# Patient Record
Sex: MALE | Race: BLACK OR AFRICAN AMERICAN | Employment: OTHER | URBAN - METROPOLITAN AREA
[De-identification: names, ages, dates, MRNs, and addresses within clinical notes are randomized per-mention and may not be internally consistent; named-entity substitution may affect disease eponyms.]

---

## 2017-07-31 ENCOUNTER — HOSPITAL ENCOUNTER (OUTPATIENT)
Dept: ULTRASOUND IMAGING | Age: 72
Discharge: HOME | End: 2017-07-31
Attending: FAMILY MEDICINE
Payer: MEDICARE

## 2017-07-31 ENCOUNTER — HOSPITAL ENCOUNTER (OUTPATIENT)
Dept: BONE DENSITY | Age: 72
Discharge: HOME | End: 2017-07-31
Attending: FAMILY MEDICINE
Payer: MEDICARE

## 2017-07-31 DIAGNOSIS — Z13.6 SCREENING FOR AAA (ABDOMINAL AORTIC ANEURYSM): ICD-10-CM

## 2017-07-31 DIAGNOSIS — M89.9 BONE DISORDER: ICD-10-CM

## 2017-07-31 PROCEDURE — 76706 US ABDL AORTA SCREEN AAA: CPT

## 2017-07-31 PROCEDURE — 77080 DXA BONE DENSITY AXIAL: CPT

## 2020-07-20 ENCOUNTER — APPOINTMENT (OUTPATIENT)
Dept: GENERAL RADIOLOGY | Age: 75
End: 2020-07-20
Attending: PHYSICIAN ASSISTANT
Payer: MEDICARE

## 2020-07-20 ENCOUNTER — APPOINTMENT (OUTPATIENT)
Dept: CT IMAGING | Age: 75
End: 2020-07-20
Attending: PHYSICIAN ASSISTANT
Payer: MEDICARE

## 2020-07-20 ENCOUNTER — HOSPITAL ENCOUNTER (EMERGENCY)
Age: 75
Discharge: HOME | End: 2020-07-20
Attending: EMERGENCY MEDICINE | Admitting: EMERGENCY MEDICINE
Payer: MEDICARE

## 2020-07-20 VITALS
TEMPERATURE: 98.5 F | RESPIRATION RATE: 16 BRPM | SYSTOLIC BLOOD PRESSURE: 189 MMHG | DIASTOLIC BLOOD PRESSURE: 71 MMHG | HEART RATE: 66 BPM | OXYGEN SATURATION: 100 %

## 2020-07-20 DIAGNOSIS — M62.838 MUSCLE SPASM: Primary | ICD-10-CM

## 2020-07-20 DIAGNOSIS — R10.9 ACUTE LEFT FLANK PAIN: ICD-10-CM

## 2020-07-20 LAB
ALBUMIN SERPL-MCNC: 3.8 G/DL (ref 3.5–5)
ALBUMIN/GLOB SERPL: 1 {RATIO} (ref 1.1–2.2)
ALP SERPL-CCNC: 50 U/L (ref 45–117)
ALT SERPL-CCNC: 46 U/L (ref 12–78)
ANION GAP SERPL CALC-SCNC: 6 MMOL/L (ref 5–15)
APPEARANCE UR: CLEAR
AST SERPL-CCNC: 24 U/L (ref 15–37)
BACTERIA URNS QL MICRO: NEGATIVE /HPF
BASOPHILS # BLD: 0 K/UL (ref 0–0.1)
BASOPHILS NFR BLD: 0 % (ref 0–1)
BILIRUB SERPL-MCNC: 0.4 MG/DL (ref 0.2–1)
BILIRUB UR QL: NEGATIVE
BUN SERPL-MCNC: 49 MG/DL (ref 6–20)
BUN/CREAT SERPL: 20 (ref 12–20)
CALCIUM SERPL-MCNC: 9.7 MG/DL (ref 8.5–10.1)
CHLORIDE SERPL-SCNC: 102 MMOL/L (ref 97–108)
CK SERPL-CCNC: 329 U/L (ref 39–308)
CO2 SERPL-SCNC: 29 MMOL/L (ref 21–32)
COLOR UR: NORMAL
COMMENT, HOLDF: NORMAL
CREAT SERPL-MCNC: 2.49 MG/DL (ref 0.7–1.3)
DIFFERENTIAL METHOD BLD: ABNORMAL
EOSINOPHIL # BLD: 0.3 K/UL (ref 0–0.4)
EOSINOPHIL NFR BLD: 4 % (ref 0–7)
EPITH CASTS URNS QL MICRO: NORMAL /LPF
ERYTHROCYTE [DISTWIDTH] IN BLOOD BY AUTOMATED COUNT: 12.6 % (ref 11.5–14.5)
GLOBULIN SER CALC-MCNC: 3.9 G/DL (ref 2–4)
GLUCOSE SERPL-MCNC: 183 MG/DL (ref 65–100)
GLUCOSE UR STRIP.AUTO-MCNC: NEGATIVE MG/DL
HCT VFR BLD AUTO: 28.5 % (ref 36.6–50.3)
HGB BLD-MCNC: 9.4 G/DL (ref 12.1–17)
HGB UR QL STRIP: NEGATIVE
HYALINE CASTS URNS QL MICRO: NORMAL /LPF (ref 0–5)
IMM GRANULOCYTES # BLD AUTO: 0 K/UL (ref 0–0.04)
IMM GRANULOCYTES NFR BLD AUTO: 0 % (ref 0–0.5)
KETONES UR QL STRIP.AUTO: NEGATIVE MG/DL
LEUKOCYTE ESTERASE UR QL STRIP.AUTO: NEGATIVE
LYMPHOCYTES # BLD: 3.3 K/UL (ref 0.8–3.5)
LYMPHOCYTES NFR BLD: 45 % (ref 12–49)
MCH RBC QN AUTO: 31.2 PG (ref 26–34)
MCHC RBC AUTO-ENTMCNC: 33 G/DL (ref 30–36.5)
MCV RBC AUTO: 94.7 FL (ref 80–99)
MONOCYTES # BLD: 0.7 K/UL (ref 0–1)
MONOCYTES NFR BLD: 10 % (ref 5–13)
NEUTS SEG # BLD: 3 K/UL (ref 1.8–8)
NEUTS SEG NFR BLD: 41 % (ref 32–75)
NITRITE UR QL STRIP.AUTO: NEGATIVE
NRBC # BLD: 0 K/UL (ref 0–0.01)
NRBC BLD-RTO: 0 PER 100 WBC
PH UR STRIP: 5 [PH] (ref 5–8)
PLATELET # BLD AUTO: 213 K/UL (ref 150–400)
PMV BLD AUTO: 10.8 FL (ref 8.9–12.9)
POTASSIUM SERPL-SCNC: 4.4 MMOL/L (ref 3.5–5.1)
PROT SERPL-MCNC: 7.7 G/DL (ref 6.4–8.2)
PROT UR STRIP-MCNC: NEGATIVE MG/DL
RBC # BLD AUTO: 3.01 M/UL (ref 4.1–5.7)
RBC #/AREA URNS HPF: NORMAL /HPF (ref 0–5)
SAMPLES BEING HELD,HOLD: NORMAL
SODIUM SERPL-SCNC: 137 MMOL/L (ref 136–145)
SP GR UR REFRACTOMETRY: 1.01 (ref 1–1.03)
UR CULT HOLD, URHOLD: NORMAL
UROBILINOGEN UR QL STRIP.AUTO: 0.2 EU/DL (ref 0.2–1)
WBC # BLD AUTO: 7.3 K/UL (ref 4.1–11.1)
WBC URNS QL MICRO: NORMAL /HPF (ref 0–4)

## 2020-07-20 PROCEDURE — 72100 X-RAY EXAM L-S SPINE 2/3 VWS: CPT

## 2020-07-20 PROCEDURE — 74011250637 HC RX REV CODE- 250/637: Performed by: PHYSICIAN ASSISTANT

## 2020-07-20 PROCEDURE — 74011250636 HC RX REV CODE- 250/636: Performed by: PHYSICIAN ASSISTANT

## 2020-07-20 PROCEDURE — 81001 URINALYSIS AUTO W/SCOPE: CPT

## 2020-07-20 PROCEDURE — 74176 CT ABD & PELVIS W/O CONTRAST: CPT

## 2020-07-20 PROCEDURE — 82550 ASSAY OF CK (CPK): CPT

## 2020-07-20 PROCEDURE — 74018 RADEX ABDOMEN 1 VIEW: CPT

## 2020-07-20 PROCEDURE — 80053 COMPREHEN METABOLIC PANEL: CPT

## 2020-07-20 PROCEDURE — 85025 COMPLETE CBC W/AUTO DIFF WBC: CPT

## 2020-07-20 PROCEDURE — 99284 EMERGENCY DEPT VISIT MOD MDM: CPT

## 2020-07-20 PROCEDURE — 36415 COLL VENOUS BLD VENIPUNCTURE: CPT

## 2020-07-20 RX ORDER — METHOCARBAMOL 500 MG/1
500 TABLET, FILM COATED ORAL 3 TIMES DAILY
Qty: 12 TAB | Refills: 0 | Status: SHIPPED | OUTPATIENT
Start: 2020-07-20 | End: 2022-10-18 | Stop reason: ALTCHOICE

## 2020-07-20 RX ORDER — OXYCODONE AND ACETAMINOPHEN 5; 325 MG/1; MG/1
1 TABLET ORAL
Status: COMPLETED | OUTPATIENT
Start: 2020-07-20 | End: 2020-07-20

## 2020-07-20 RX ADMIN — OXYCODONE HYDROCHLORIDE AND ACETAMINOPHEN 1 TABLET: 5; 325 TABLET ORAL at 17:14

## 2020-07-20 RX ADMIN — SODIUM CHLORIDE 500 ML: 900 INJECTION, SOLUTION INTRAVENOUS at 17:15

## 2020-07-20 NOTE — ED NOTES

## 2020-07-20 NOTE — ED PROVIDER NOTES
HPI     Patient presents the emergency department today with left lower back pain flank pain. Patient is also noticed a change in his urine color is very dark now he is describing this is brown. Patient has not noticed any dysuria, and has not had any fevers. He has not had any chills, but has been noticing some body aches. Denies nausea or vomiting. He states he finds it extremely difficult to get comfortable, especially at night. This is been going on for several days  Past Medical History:   Diagnosis Date    Diabetes ([facility] [address]     Hypertension     Prostate CA ([facility] [address]     Sleep apnea        Past Surgical History:   Procedure Laterality Date    HX VEIN STRIPPING           History reviewed. No pertinent family history.     Social History     Socioeconomic History    Marital status:      Spouse name: Not on file    Number of children: Not on file    Years of education: Not on file    Highest education level: Not on file   Occupational History    Not on file   Social Needs    Financial resource strain: Not on file    Food insecurity     Worry: Not on file     Inability: Not on file    Transportation needs     Medical: Not on file     Non-medical: Not on file   Tobacco Use    Smoking status: Not on file   Substance and Sexual Activity    Alcohol use: Not on file    Drug use: Not on file    Sexual activity: Not on file   Lifestyle    Physical activity     Days per week: Not on file     Minutes per session: Not on file    Stress: Not on file   Relationships    Social connections     Talks on phone: Not on file     Gets together: Not on file     Attends Gnosticism service: Not on file     Active member of club or organization: Not on file     Attends meetings of clubs or organizations: Not on file     Relationship status: Not on file    Intimate partner violence     Fear of current or ex partner: Not on file     Emotionally abused: Not on file     Physically abused: Not on file     Forced sexual activity: Not on file   Other Topics Concern    Not on file   Social History Narrative    Not on file         ALLERGIES: Patient has no known allergies. Review of Systems   Constitutional: Negative for activity change, appetite change, fatigue and fever. HENT: Negative for ear pain, rhinorrhea, sore throat and trouble swallowing. Eyes: Negative for photophobia and visual disturbance. Respiratory: Negative for cough, chest tightness and shortness of breath. Cardiovascular: Negative for chest pain, palpitations and leg swelling. Gastrointestinal: Positive for abdominal pain. Negative for diarrhea, nausea and vomiting. Endocrine: Negative for polyuria. Genitourinary: Positive for hematuria. Negative for dysuria, frequency and urgency. Brown urine   Musculoskeletal: Positive for back pain. Negative for neck pain. Skin: Negative for color change. Neurological: Negative for dizziness, weakness, light-headedness, numbness and headaches. Hematological: Negative for adenopathy. Does not bruise/bleed easily. Vitals:    07/20/20 1058   BP: (!) 204/70   Pulse: 67   Resp: 16   Temp: 98.4 °F (36.9 °C)   SpO2: 96%            Physical Exam  Vitals signs and nursing note reviewed. Constitutional:       General: He is not in acute distress. Appearance: Normal appearance. He is normal weight. He is not ill-appearing, toxic-appearing or diaphoretic. HENT:      Head: Normocephalic and atraumatic. Right Ear: External ear normal.      Left Ear: External ear normal.   Eyes:      Extraocular Movements: Extraocular movements intact. Pupils: Pupils are equal, round, and reactive to light. Neck:      Musculoskeletal: Normal range of motion. Cardiovascular:      Rate and Rhythm: Normal rate and regular rhythm. Pulses: Normal pulses. Heart sounds: Normal heart sounds. Pulmonary:      Effort: Pulmonary effort is normal. No respiratory distress.       Breath sounds: Normal breath sounds. No wheezing, rhonchi or rales. Abdominal:      General: Abdomen is flat. There is no distension. Palpations: Abdomen is soft. There is no mass. Tenderness: There is abdominal tenderness. There is no guarding or rebound. Hernia: No hernia is present. Musculoskeletal: Normal range of motion. General: Tenderness present. Skin:     General: Skin is warm. Neurological:      General: No focal deficit present. Mental Status: He is alert and oriented to person, place, and time. Psychiatric:         Mood and Affect: Mood normal.         Behavior: Behavior normal.          MDM     Kidney stone, muscle spasm, fracture, colitis, diverticulitis, uti, rhabdomyolysis    Is a well-appearing 77-year-old male presenting to the emergency department today with complaint of flank pain. She does have an elevated creatinine level, however states that he is under the care of a nephrologist, and drinks a lot of water throughout the day. I discussed this patient's lab work with Dr. Pinky Hill including mildly elevated cpk and cr, given that we have patient's baseline labs from 2014, the elevated creatinine is likely consistent with progression of renal failure. CPK elevation is not significant. PT's urine is clear. We did provide rehydration therapy. Patient did not have any significant findings on CT. We have ordered muscle relaxer and have addressed his pain here in the emergency department. Dr. Brad Chavarria agrees that this pt is stable to discharge home.   Gen Hart PA-C  6:10 PM      Procedures

## 2020-07-20 NOTE — ED TRIAGE NOTES
Pt c/o left lower back and hip pain for a week or so, also noticed that his urine is brown, denies fever , denies n/v, denies injury

## 2022-06-09 ENCOUNTER — HOSPITAL ENCOUNTER (OUTPATIENT)
Dept: GENERAL RADIOLOGY | Age: 77
Discharge: HOME | End: 2022-06-09
Attending: INTERNAL MEDICINE
Payer: MEDICARE

## 2022-06-09 ENCOUNTER — TRANSCRIBE ORDER (OUTPATIENT)
Dept: GENERAL RADIOLOGY | Age: 77
End: 2022-06-09

## 2022-06-09 DIAGNOSIS — I10 ESSENTIAL HYPERTENSION: Primary | ICD-10-CM

## 2022-06-09 DIAGNOSIS — I10 ESSENTIAL HYPERTENSION: ICD-10-CM

## 2022-06-09 PROCEDURE — 72100 X-RAY EXAM L-S SPINE 2/3 VWS: CPT

## 2022-06-17 ENCOUNTER — TRANSCRIBE ORDER (OUTPATIENT)
Dept: SCHEDULING | Age: 77
End: 2022-06-17

## 2022-06-17 DIAGNOSIS — M48.062 SPINAL STENOSIS, LUMBAR REGION WITH NEUROGENIC CLAUDICATION: Primary | ICD-10-CM

## 2022-07-15 ENCOUNTER — HOSPITAL ENCOUNTER (OUTPATIENT)
Dept: MRI IMAGING | Age: 77
Discharge: HOME | End: 2022-07-15
Attending: PHYSICIAN ASSISTANT
Payer: MEDICARE

## 2022-07-15 DIAGNOSIS — M48.062 SPINAL STENOSIS, LUMBAR REGION WITH NEUROGENIC CLAUDICATION: ICD-10-CM

## 2022-07-15 PROCEDURE — 72148 MRI LUMBAR SPINE W/O DYE: CPT

## 2022-09-14 LAB
CREATININE, EXTERNAL: 2.45
HBA1C MFR BLD HPLC: 9.4 %
MICROALBUMIN UR TEST STR-MCNC: 54.9 MG/DL

## 2022-10-18 ENCOUNTER — OFFICE VISIT (OUTPATIENT)
Dept: ENDOCRINOLOGY | Age: 77
End: 2022-10-18
Payer: MEDICARE

## 2022-10-18 VITALS
WEIGHT: 241 LBS | HEIGHT: 70 IN | SYSTOLIC BLOOD PRESSURE: 147 MMHG | HEART RATE: 77 BPM | BODY MASS INDEX: 34.5 KG/M2 | DIASTOLIC BLOOD PRESSURE: 66 MMHG

## 2022-10-18 DIAGNOSIS — E78.2 MIXED HYPERLIPIDEMIA: ICD-10-CM

## 2022-10-18 DIAGNOSIS — E11.22 TYPE 2 DIABETES MELLITUS WITH STAGE 4 CHRONIC KIDNEY DISEASE, WITH LONG-TERM CURRENT USE OF INSULIN (HCC): ICD-10-CM

## 2022-10-18 DIAGNOSIS — E11.65 TYPE 2 DIABETES MELLITUS WITH HYPERGLYCEMIA, WITH LONG-TERM CURRENT USE OF INSULIN (HCC): Primary | ICD-10-CM

## 2022-10-18 DIAGNOSIS — N18.4 TYPE 2 DIABETES MELLITUS WITH STAGE 4 CHRONIC KIDNEY DISEASE, WITH LONG-TERM CURRENT USE OF INSULIN (HCC): ICD-10-CM

## 2022-10-18 DIAGNOSIS — E11.29 TYPE 2 DIABETES MELLITUS WITH MICROALBUMINURIA, WITH LONG-TERM CURRENT USE OF INSULIN (HCC): ICD-10-CM

## 2022-10-18 DIAGNOSIS — R80.9 TYPE 2 DIABETES MELLITUS WITH MICROALBUMINURIA, WITH LONG-TERM CURRENT USE OF INSULIN (HCC): ICD-10-CM

## 2022-10-18 DIAGNOSIS — Z79.4 TYPE 2 DIABETES MELLITUS WITH MICROALBUMINURIA, WITH LONG-TERM CURRENT USE OF INSULIN (HCC): ICD-10-CM

## 2022-10-18 DIAGNOSIS — I10 PRIMARY HYPERTENSION: ICD-10-CM

## 2022-10-18 DIAGNOSIS — Z79.4 TYPE 2 DIABETES MELLITUS WITH HYPERGLYCEMIA, WITH LONG-TERM CURRENT USE OF INSULIN (HCC): Primary | ICD-10-CM

## 2022-10-18 DIAGNOSIS — Z79.4 TYPE 2 DIABETES MELLITUS WITH STAGE 4 CHRONIC KIDNEY DISEASE, WITH LONG-TERM CURRENT USE OF INSULIN (HCC): ICD-10-CM

## 2022-10-18 PROCEDURE — 1123F ACP DISCUSS/DSCN MKR DOCD: CPT | Performed by: INTERNAL MEDICINE

## 2022-10-18 PROCEDURE — G8753 SYS BP > OR = 140: HCPCS | Performed by: INTERNAL MEDICINE

## 2022-10-18 PROCEDURE — G8432 DEP SCR NOT DOC, RNG: HCPCS | Performed by: INTERNAL MEDICINE

## 2022-10-18 PROCEDURE — 1101F PT FALLS ASSESS-DOCD LE1/YR: CPT | Performed by: INTERNAL MEDICINE

## 2022-10-18 PROCEDURE — G8536 NO DOC ELDER MAL SCRN: HCPCS | Performed by: INTERNAL MEDICINE

## 2022-10-18 PROCEDURE — G8427 DOCREV CUR MEDS BY ELIG CLIN: HCPCS | Performed by: INTERNAL MEDICINE

## 2022-10-18 PROCEDURE — G8754 DIAS BP LESS 90: HCPCS | Performed by: INTERNAL MEDICINE

## 2022-10-18 PROCEDURE — G8417 CALC BMI ABV UP PARAM F/U: HCPCS | Performed by: INTERNAL MEDICINE

## 2022-10-18 PROCEDURE — 99205 OFFICE O/P NEW HI 60 MIN: CPT | Performed by: INTERNAL MEDICINE

## 2022-10-18 RX ORDER — CLONIDINE HYDROCHLORIDE 0.1 MG/1
0.1 TABLET ORAL 2 TIMES DAILY
COMMUNITY
Start: 2022-10-12

## 2022-10-18 RX ORDER — GABAPENTIN 100 MG/1
CAPSULE ORAL
COMMUNITY
Start: 2022-09-03

## 2022-10-18 RX ORDER — INSULIN GLARGINE 300 U/ML
INJECTION, SOLUTION SUBCUTANEOUS
Qty: 33 ML | Refills: 1 | Status: SHIPPED | OUTPATIENT
Start: 2022-10-18

## 2022-10-18 RX ORDER — FENOFIBRATE 134 MG/1
CAPSULE ORAL
COMMUNITY
Start: 2022-08-29

## 2022-10-18 RX ORDER — FUROSEMIDE 40 MG/1
40 TABLET ORAL DAILY
COMMUNITY
Start: 2022-08-26

## 2022-10-18 RX ORDER — SEMAGLUTIDE 1.34 MG/ML
INJECTION, SOLUTION SUBCUTANEOUS
COMMUNITY
Start: 2022-09-12 | End: 2022-10-18 | Stop reason: DRUGHIGH

## 2022-10-18 RX ORDER — SEMAGLUTIDE 2.68 MG/ML
2 INJECTION, SOLUTION SUBCUTANEOUS
Qty: 1 PEN | Refills: 5 | Status: SHIPPED | OUTPATIENT
Start: 2022-10-18

## 2022-10-18 RX ORDER — DILTIAZEM HYDROCHLORIDE 120 MG/1
120 CAPSULE, EXTENDED RELEASE ORAL DAILY
COMMUNITY
Start: 2022-10-06

## 2022-10-18 RX ORDER — RAMIPRIL 10 MG/1
CAPSULE ORAL
COMMUNITY
Start: 2022-09-08

## 2022-10-18 RX ORDER — CLOBETASOL PROPIONATE 0.5 MG/G
CREAM TOPICAL
COMMUNITY

## 2022-10-18 RX ORDER — INSULIN GLARGINE 300 U/ML
INJECTION, SOLUTION SUBCUTANEOUS
COMMUNITY
Start: 2022-09-15 | End: 2022-10-18 | Stop reason: ALTCHOICE

## 2022-10-18 RX ORDER — SEMAGLUTIDE 1.34 MG/ML
1 INJECTION, SOLUTION SUBCUTANEOUS
Qty: 1 EACH | Refills: 0 | Status: SHIPPED | OUTPATIENT
Start: 2022-10-18

## 2022-10-18 RX ORDER — INSULIN ASPART 100 [IU]/ML
INJECTION, SOLUTION INTRAVENOUS; SUBCUTANEOUS
COMMUNITY
Start: 2022-07-14

## 2022-10-18 RX ORDER — ATORVASTATIN CALCIUM 20 MG/1
TABLET, FILM COATED ORAL
COMMUNITY
Start: 2022-08-26

## 2022-10-18 NOTE — LETTER
10/18/2022    Patient: Yesica Spencer   YOB: 1945   Date of Visit: 10/18/2022     Lev Dennis MD  [facility] [address] [address]  Via Fax: [phone]    Dear Lev Dennis MD,      Thank you for referring Mr. Yesica Spencer to [facility] for evaluation. My notes for this consultation are attached. If you have questions, please do not hesitate to call me. I look forward to following your patient along with you.       Sincerely,    Tania Rai MD

## 2022-10-18 NOTE — PATIENT INSTRUCTIONS
Form sent to [address] [place] [address] for LIBRE2 continuous glucose monitor   [employer] [phone] - call if you do not hear anything in a week    Referral made to [facility] for [facility] Health for more education ([phone])    Change Toujeo to [facility]     Continue same insulin doses but learn to adjust at meals based on what you are eating and prior response    Also add Novolog before meals if sugars are high based on this scale    150-200 4 units  200-250 8 units  250-300 12 units  Over 300  16 units    Increase you ozempic to 0.5mg  Once out, increase to 1.0mg once a week for one month (written Rx given)  And then 2.0mg once a week (written Rx given)

## 2022-10-18 NOTE — PROGRESS NOTES
Chief Complaint   Patient presents with    Diabetes       Patient was last seen: New Patient Visit     General:   DM 15-20 years  Metformin early on  But then steroids and sugars 500  Put on insulin then     May have been on glipizide  No DPP4, no SGLT, no TZD  On ozempic for ~ 6 mo     Medicate, no deductible, has supplement, no donut hole issues      A1c: last a1c was 9.4    DM Medications: Toujeo 110 units daily (80 + 30) - rotating injections  Novolog 36/28/28 15 min before eating   Ozempic 0.25mg/wk (was a break, working way back up)     Not a snacker     Last Changes: :ozempic added     Sugar Checks: checks up to 4 x day     AM: reports:  150-170    PM: reports:  can go into 200s     LOWs:  has low sugars     DIET: has received diabetic meal training - long ago    EXERCISE: exercise limited due to pain -back     HTN: on ACE-I, on Ca-Blk, HTN followed by PCP, HTN followed by Cardiology clonidine    LIPIDS: on statin, lipids followed by PCP, lipids followed by Cardiology    RENAL: has severe renal insufficiency, is followed by Nephrology, is on an ACE-I     EYES: overdue for eye exam, has no retinopathy     FEET: sees podiatry, has no current issues     DENTAL:  overdue for dentist     HEART:  no chest pain, shortness of breath or claudication, has no cardiac history, is followed by Cardiology for HTN    ASA:  is on aspirin     SYMPTOMS: no polyuria, thirst or blurred vision     THYROID: no known thyroid issue    DIABETES HISTORY: see above`      LABS/STUDIES:   5/9/22 a1c 8.2  9/13/22 GFR 26, RAMONE-r 95, a1c 9.4       Past Medical History:   Diagnosis Date    Diabetes ([facility] [address]     Hypertension     Prostate CA ([facility] [address]     Sleep apnea       Blood pressure (!) 147/66, pulse 77, height 5' 10\" (1.778 m), weight 241 lb (109.3 kg).      Weight Metrics 10/18/2022 6/9/2014   Weight 241 lb 226 lb 3.2 oz   BMI 34.58 kg/m2 32.46 kg/m2        EXAM  - GENERAL: NCAT, Appears well nourished   - EYES: EOMI, non-icteric, no proptosis - Ear/Nose/Throat: NCAT, no visible inflammation or masses   - CARDIOVASCULAR: no cyanosis, no visible JVD   - RESPIRATORY: respiratory effort normal without any distress or labored breathing   - MUSCULOSKELETAL: Normal ROM of neck and upper extremities observed   - SKIN: No rash on face  - NEUROLOGIC:  No facial asymmetry (Cranial nerve 7 motor function), No gaze palsy   - PSYCHIATRIC: Normal affect, Normal insight and judgement      Assessment/Plan:   1. Type 2 diabetes mellitus with hyperglycemia, with long-term current use of insulin ([facility] [address]  Form sent to [address] [place] [address] for LIBRE2 continuous glucose monitor   [employer] [phone] - call if you do not hear anything in a week    Referral made to [facility] for Diabetes Health for more education ([phone])    Change Tojeo to [facility]     Continue same insulin doses but learn to adjust at meals based on what you are eating and prior response    Also add Novolog before meals if sugars are high based on this scale    150-200 4 units  200-250 8 units  250-300 12 units  Over 300  16 units    Increase you ozempic to 0.5mg  Once out, increase to 1.0mg once a week for one month (written Rx given)  And then 2.0mg once a week (written Rx given)    he has the following indications to start treatment with Freestyle Sukumar:  1) he has type 2 diabetes and is on an intensive insulin regimen with 4 injections per day  2) he tests his blood sugar 4 times per day and makes treatment decisions off his blood sugar readings and freestyle sukumar sensor readings  3) he requires frequent adjustments to his insulin injection doses based on his freestyle sukumar sensor readings  4) he has benefited from therapeutic continuous glucose monitoring and I recommend that he start this  5) he is seen in my office every 3-4 months    6) he has a history of hypoglycemia    2. Primary hypertension  On meds per PCP/Cards    3. Mixed hyperlipidemia  On statin per PCP/Cards  4.  Type 2 diabetes mellitus with microalbuminuria, with long-term current use of insulin ([address]  Per renal    5. Type 2 diabetes mellitus with stage 4 chronic kidney disease, with long-term current use of insulin ([address]  Per renal      60+ minutes spend face to face and reviewing records (labs/notes/studies)    Orders Placed This Encounter    REFERRAL TO DIABETIC EDUCATION     Referral Priority:   Routine     Referral Type:   Consultation     Referral Reason:   Specialty Services Required     Number of Visits Requested:   1    insulin glargine U-300 conc (Toujeo Max U-300 SoloStar) 300 unit/mL (3 mL) inpn     Sig: Inject 110 units daily     Dispense:  33 mL     Refill:  1    semaglutide (Ozempic) 1 mg/dose (4 mg/3 mL) pnij     Si mg by SubCUTAneous route every seven (7) days. Dispense:  1 Each     Refill:  0     Move up to 2.0mg after one month (has written rx)    semaglutide (Ozempic) 2 mg/dose (8 mg/3 mL) pnij     Si mg by SubCUTAneous route every seven (7) days. Dispense:  1 Pen     Refill:  5        Follow-up and Dispositions    Return 6-8 weeks, for diabetes.

## 2022-11-21 ENCOUNTER — CLINICAL SUPPORT (OUTPATIENT)
Dept: DIABETES SERVICES | Age: 77
End: 2022-11-21
Payer: MEDICARE

## 2022-11-21 DIAGNOSIS — E11.65 TYPE 2 DIABETES MELLITUS WITH HYPERGLYCEMIA, WITH LONG-TERM CURRENT USE OF INSULIN (HCC): Primary | ICD-10-CM

## 2022-11-21 DIAGNOSIS — Z79.4 TYPE 2 DIABETES MELLITUS WITH HYPERGLYCEMIA, WITH LONG-TERM CURRENT USE OF INSULIN (HCC): Primary | ICD-10-CM

## 2022-11-21 PROCEDURE — G0108 DIAB MANAGE TRN  PER INDIV: HCPCS

## 2022-11-21 NOTE — PROGRESS NOTES
[facility] Program for Diabetes Health  Diabetes Self-Management Education & Support Program    Reason for Referral: Increased A1c, no previous DSMES  Referral Source: Isak Hartmann MD  Services requested: DSMES       ASSESSMENT    From my perspective, the participant would benefit from [facility] specifically related to reducing risks, healthy eating, monitoring, taking medications, physical activity, healthy coping, and problem solving. Will adapt DSMES program to build on participant's skills score, confidence score, and preparedness score as noted in the Diabetes Skills, Confidence, and Preparedness Index. During the program, we will focus on providing DSMES that specifically addresses participant's interest in reducing risks, healthy eating, monitoring, taking medications, physical activity, healthy coping, and problem solving, as shown by their reported readiness to change. The participant would be best served by attending weekly individual sessions. No local Group Class    Diabetes Self-Management Education Follow-up Visit: 12/19/2022. Clinical Presentation  Sid Omer is a 68 y.o.  male referred for diabetes self-management education. Participant has Type 2 DM on insulin for 11-20 years. Family history positivefor diabetes.  Patient reports not receiving DSMES services in the past. Most recent A1c value: No results found for: HBA1C, URW9YMSP, CAB2LVPJ    Diabetes-related medical history:  Acute complications  none  Neurological complications  none  Microvascular disease  nephropathy  Macrovascular disease  none  Other associated conditions     none    Diabetes-related medications:  Current dosing:   Key Antihyperglycemic Medications               NovoLOG Flexpen U-100 Insulin 100 unit/mL (3 mL) inpn INJECT 36 UNITS SUBCUTANEOUSLY WITH BREAKFAST AND 28 UNITS WITH LUNCH AND 28 UNITS WITH DINNER  PLUS Sliding Scale    insulin glargine U-300 conc (Toujeo Max U-300 SoloStar) 300 unit/mL (3 mL) inpn Inject 110 units daily    semaglutide (Ozempic) 1 mg/dose (4 mg/3 mL) pnij 1 mg by SubCUTAneous route every seven (7) days. semaglutide (Ozempic) 2 mg/dose (8 mg/3 mL) pnij 2 mg by SubCUTAneous route every seven (7) days. Blood Pressure Management  Key ACE/ARB Medications               ramipriL (ALTACE) 10 mg capsule             Lipid Management  Key Antihyperlipidemia Meds               atorvastatin (LIPITOR) 20 mg tablet TAKE 1 TABLET BY MOUTH ONCE DAILY AS DIRECTED AT BEDTIME FOR 90 DAYS    fenofibrate micronized (LOFIBRA) 134 mg capsule TAKE 1 CAPSULE BY MOUTH ONCE DAILY WITH A MEAL            Clot Prevention  Key Anti-Platelet Anticoagulant Meds       The patient is on no antiplatelet meds or anticoagulants. Learning Assessment  Learning objectives Educator assessment (11/21/2022)   Diabetes Disease Process  The participant can   A) describe diabetes in basic terms;   B) state the type of diabetes they have; &   C) state accepted blood glucose targets. Healthy Eating  The participant can   A) identify carbohydrate foods; &   B) accurately read food labels. Being Active  The participant can  A) state the benefits of physical activity;  B) report their current PA practices;  C) identify PA they would consider incorporating in their lives; &  D) develop an implementation plan. Monitoring  The participant can  A) operate their blood glucose meter; &  B) describe how they log their blood glucoses to share with their provider. Taking Medications  The participant can  A) name their diabetes medications;  B) state the purpose and dose;  C) note side effects; &  D) describe proper storage, disposal & transport (if appropriate). Healthy Coping  The participant can    A) describe their response to diabetes diagnosis;   B) describe their specific coping mechanisms;  C) identify supportive people and/or other resources that positively support their diabetes self-care and health. Reducing Risks  The participant can describe the preventive measures used by providers to promote health and prevent diabetes complications. Problem Solving  The participant can   A) identify signs, symptoms & treatment of hypoglycemia;    B) identify signs, symptoms & treatment of hyperglycemia;  C) describe their sick day plan; &  D) identify BG patterns to discuss with their provider. No  Yes  No        No  No        Yes  Yes  Yes  Yes        Yes  Yes        Yes  No  No  Yes        Yes  Yes  Yes        Yes          No  No  No  No     Characteristics to Learning   Barriers to Learning      None     Favorite Ways to Learn   [x] Lecture  [x] Slides  [x] Reading [] Video-Internet  [] Cassettes/CDs/MP3's  [] Interactive Small Groups [x] Other       Behavioral Assessment  Current self-care practices  Educator assessment (11/21/2022)   Healthy Eating   Current practices    24-hour Dietary Recall:  Breakfast: Can be eggs and sausage, or oat meal, or cereal - cut out b/c of impact on BGs  Lunch: can be a bagel \"cheese pizza\" or deli meat  Dinner: meat, sometimes rice, green vegetables. Was told by nephrology to cut out green leafy vegetables, he is not clear why, was not given information on other foods to limit  Snacks: rarely  Beverages: fruit juice, water  Alcohol: none       Would benefit from DSMES related to Healthy Eating: Yes    Eats a carbohydrate controlled diet: No    Stage of change: Preparation      Being Active  Current practices  How many days during the past week have you performed physical activity where your heart beats faster and your breathing is harder than normal for 30 minutes or more? 2 day(s)    How many days in a typical week do you perform activity such as this?  2 day(s)    Recovering from back injury, ready to ease back into using the home treadmill and bike.      Would benefit from [facility] related to Being Active: Yes}      Exercises 150 minutes/week: No      Stage of change: Preparation     Monitoring  Current practices  Do you monitor your blood sugar? Yes    How often do you monitor? 4x/day    What are the range of readings? 150-300 mg/dL  Breakfast: 150-170 mg/dL   Lunch: 160-200 mg/dL   Dinner: 150-200 mg/dL      Do you know your last A1c measurement? No    Do you know the meaning of the A1c? No     Would benefit from [facility] related to Monitoring: Yes      Uses BG readings to establish trends and understand BG patterns: No      Stage of change: Preparation       Taking Medication  Current practices  Do you understand what your diabetes medications do? No    How often do you miss doses of your diabetes medications? never    Can you afford your diabetes medications? Yes   Would benefit from [facility] related to Taking Medication: Yes      Takes medications consistently to receive full benefit: Yes      Stage of change: Action       Healthy Coping   Current state  Diabetes Skills, Confidence and Preparedness Index: Total score: 4.9  Skills: 4.0  Confidence: 6.0  Preparedness: 4.9       Would benefit from DSMES related to Healthy Coping: Yes      Identifies specific people, organizations,etc, that actively support their diabetes self-care efforts: Yes      Stage of change: Maintenance      Reducing Risks  Current state  Vaccines:  Influenza: There is no immunization history on file for this patient. Pneumococcal:   There is no immunization history on file for this patient. Hepatitis:   There is no immunization history on file for this patient.     Examinations:  No Diabetic HM Topics for this patient     Dental exam: due    Foot exam:  <12 months    Heart Protection:  BP Readings from Last 2 Encounters:   10/18/22 (!) 147/66   07/20/20 189/71        No results found for: LDL, LDLC, DLDLP     Kidney Protection:  No results found for: MCACR, MCA1, MCA2, MCA3, MCAU, MCAU2, MCALPOCT     Would benefit from [facility] related to Reducing Risks: Yes      Actively participates in decision-making with provider regarding secondary prevention:  Yes      Stage of change: Action   Problem Solving  Current state  Hypoglycemia Management:  What are signs and symptoms of hypoglycemia that you experience: Sweat/clammy skin    How do you prevent hypoglycemia: consistent meals/snack time and take medications as instructed    How do you treat hypoglycemia: Patient is unaware of how to treat hypoglycemia    Hyperglycemia Management:  What are signs and symptoms of hyperglycemia that you experience: Pt reported being unaware of s/s of hyperglycemia    How can you prevent hyperglycemia: take medications as instructed and focus on carbohydrate counting/meal planning    Sick Day Management:  What do you do differently on sick days:  Pt reported being unaware of self-management on sick days    Pattern Management:  Do you notice blood glucose patterns when you look at the readings in your meter or logbook? Yes    How do you use the blood glucose readings from your meter or logbook? Not yet using BG readings to elicit behavior changes. Would benefit from [facility] related to Problem Solving: Yes      Articulates appropriate strategies to address hypoglycemia, hyperglycemia, sick day care and BG pattern: No      Stage of change: Preparation       Note: Content derived from the American Association of Diabetes Educators' Diabetes Education Curriculum: A Guide to Successful Self-Management (3rd edition)      Renny Sparks RN on 11/21/2022 at 11:49 AM    I have personally reviewed the health record, including provider notes, laboratory data and current medications before making these care and education recommendations. The time spent in this effort is included in the total time. Total minutes: 30    [id number] [facility] Emergency Adaptations for Telehealth:  Shantel Lewis, was evaluated through a synchronous (real-time) audio-video encounter.  The patient (or guardian if applicable) is aware that this is a billable service, which includes applicable co-pays. This Virtual Visit was conducted with patient's (and/or legal guardian's) consent. The visit was conducted pursuant to the emergency declaration under the [facility], [address] [place] [address] authority and the [employer] Act. Patient identification was verified, and a caregiver was present when appropriate. The patient was located in a state where the provider was licensed to provide care. Overall SCPI score: 4.9 Skills Score: 4.0  Preparedness Score: 6.0    Confidence Score: 4.9  Low: Healthy Eating(Q1),Taking Medication(Q2),Healthy Coping(Q7),Reducing Risks(Q9) Confidence Score: 4.9  Low: Healthy Eating(Q1),Reducing Risks(Q3) Preparedness Score: 6.0  Low: Healthy Eating(Q1),Being Active(Q2),Healthy Coping(Q3),Reducing Risks(Q4),Taking Medication(Q5),Blood Sugar Monitoring(Q6),Problem KLPNGSH(F3)  Healthy Eating Score: 4.0  Low: Skills(Q1),Confidence(Q1) Taking Medication Score: 4.0  Low: Skills(Q2) Blood Sugar Monitoring Score: 5.6  Low: Skills(Q3),Skills(Q8) Reducing Risks Score: 4.0  Low: Skills(Q9),Confidence(Q3)  Problem Solving Score: 6.0  Low: Skills(Q6),Confidence(Q7),Preparedness(Q7) Healthy Coping Score: 4.7  Low: RBIJXI(W6) Being Active Score: 6.0  Low: Confidence(Q5),Preparedness(Q2)    Skills/Knowledge Questions  1. I know how to plan meals that have the best balance between carbohydrates, proteins and vegetables. 2  2. I know how my diabetes medications (pills, injectables and/or insulin) work in my body. 2  3. I know when to check my blood sugar if I want to see how my body responded to a meal. 5  4. I know when to check my blood sugars to determine if my medication or insulin doses are correct. 6  5. I know what to do to prevent a low blood sugar when I exercise (either before, during, or after). 6  6.  When I am sick, I know what to do differently with my diabetes management. 6  7. I know how stress can affect my diabetes management. 2  8. When I look at my blood sugars over a given week, I can explain what my blood sugar pattern is. 5  9. I know what my target levels are for A1c, blood pressure and cholesterol. 2  Confidence Questions  1. I am confident that I can plan balanced meals and snacks. 2  2. I am confident that I can manage my stress. 6  3. I am confident that I can prevent a low blood sugar during or after exercise. 2  4. I am confident that the next time I eat out, I will be able to choose foods that best keep my blood sugars in target. 6  5. I am confident I can include exercise into my schedule. 6  6. I am confident that I can use my daily blood sugars to adjust my diet, my activity, and/or my insulin. 6  7. When something out of my normal routine happens, I am confident that I can problem-solve and keep my diabetes on track. 6  Preparedness Questions  1. Within the next month, I will begin to eat more balanced meals and snacks. 6  2. Within the next month, I will choose an exercise activity and I will start fitting it into my schedule. 6  3. Within the next month, I will make a list of stress management options that work for me. 6  4. Within the next month, I will consistently plan ahead to prevent low blood sugars. 6  5. Within the next month, I will start adjusting my insulin doses on my own. 6  6. Within the next month, I will begin making changes to my diabetes management based on my daily blood sugars (eg - eating, activity and/or insulin). 6  7. Within the next month, I will begin making changes to my diabetes management to meet my overall goals (eg - eating, activity and/or insulin).  6

## 2023-01-09 ENCOUNTER — CLINICAL SUPPORT (OUTPATIENT)
Dept: DIABETES SERVICES | Age: 78
End: 2023-01-09
Payer: MEDICARE

## 2023-01-09 DIAGNOSIS — E11.65 TYPE 2 DIABETES MELLITUS WITH HYPERGLYCEMIA, WITH LONG-TERM CURRENT USE OF INSULIN (HCC): Primary | ICD-10-CM

## 2023-01-09 DIAGNOSIS — Z79.4 TYPE 2 DIABETES MELLITUS WITH HYPERGLYCEMIA, WITH LONG-TERM CURRENT USE OF INSULIN (HCC): Primary | ICD-10-CM

## 2023-01-09 PROCEDURE — G0108 DIAB MANAGE TRN  PER INDIV: HCPCS

## 2023-01-10 ENCOUNTER — HOSPITAL ENCOUNTER (OUTPATIENT)
Dept: GENERAL RADIOLOGY | Age: 78
Discharge: HOME | End: 2023-01-10
Attending: INTERNAL MEDICINE
Payer: MEDICARE

## 2023-01-10 ENCOUNTER — TRANSCRIBE ORDER (OUTPATIENT)
Dept: GENERAL RADIOLOGY | Age: 78
End: 2023-01-10

## 2023-01-10 DIAGNOSIS — J40 BRONCHITIS: Primary | ICD-10-CM

## 2023-01-10 DIAGNOSIS — J40 BRONCHITIS: ICD-10-CM

## 2023-01-10 PROCEDURE — 71046 X-RAY EXAM CHEST 2 VIEWS: CPT

## 2023-01-11 NOTE — PROGRESS NOTES
[address] [place] [address] Diabetes Health  Diabetes Self-Management Education & Support Program  Encounter Note    1/9/2023 - When Mr. Niraj Akhtar arrived in the office he was visibly short of breath and walking much slower than the last time I saw him. He complained of SOB, bouts of vertigo/dizziness when walking, congested ears/sinuses/throat/persistent productive cough with clear sputum, and uncharacteristic forgetfulness for x 3-4 weeks post Covid infection. His vitals at 2:51 PM: O2 - 95%, Pulse 74, /64 seated. He felt unsure if he should go to urgent care and requested that I reach out to his PCP Dr. Geronimo Oconnell for guidance. I spoke with her and Mr. Niraj Akhtar was scheduled for a visit in her office on 1/10/23. Mr. Niraj Akhtar felt well enough to discuss his continuous glucose monitoring system after this. He showed no signs of distress and stated he would be able to drive himself and his wife home without difficulty. Patient in office 60 minutes, 30 minutes of education. SUMMARY  Diabetes self-care management training was completed related to monitoring. The participant will return on January 16 to continue DSMES related to reducing risks. The participant did not identify SMART Goal(s) and will practice knowledge and skills related to reducing risks, healthy eating and monitoring, medications, healthy coping, and problem solving to improve diabetes self-management. EVALUATION:  The process of prepping for and application of the [address] sensor was completed in the office, Mr. Niraj Akhtar stated he will be able to complete this step independently at home. He was show how to operate the [address] reader and was able to demonstrate how to scan the sensor to start it and to obtain BGs. He expressed understanding of how often to scan to capture all data and the timer feature was set to help him remember to scan. Next week we will look at his numbers together.   I would like for him to become accustomed to scanning and changing the sensors before we discuss the specifics of monitoring and making behavior changes based on the readings. RECOMMENDATIONS:  Follow up with Dr. Jaqueline Sharma about post-Covid symptoms. TOPICS DISCUSSED TODAY:  HOW CAN BLOOD GLUCOSE MONITORING HELP ME? 30      Next provider visit is scheduled for 1/16/2023 for DSMES. SMART GOAL(S)   TBD  ACHIEVEMENT OF GOAL(S) :     2.     ACHIEVEMENT OF GOAL(S) :      3.     ACHIEVEMENT OF GOAL(S) :         DATE DSMES TOPIC EVALUATION     1/11/2023 HOW CAN BLOOD GLUCOSE MONITORING HELP ME? Value of blood glucose monitoring   Realistic expectations   Blood glucose monitoring targets   Target adjustments   Setting a1c & blood glucose targets with provider   Meter selection    Technique for obtaining blood droplet   Blood glucose testing sites   Determining best times to test   Pregnancy recommendations   Data sharing with provider        The participant   Can demonstrate their glucometer procedure: Yes, in progress  Logs their BG readings:  Yes, in progress    The participant needs to address - see notes above. TOMÁS Lopez on 1/11/2023 at 4:11 PM    I have personally reviewed the health record, including provider notes, laboratory data and current medications before making these care and education recommendations. The time spent in this effort is included in the total time.   Total minutes: 30

## 2023-01-16 ENCOUNTER — CLINICAL SUPPORT (OUTPATIENT)
Dept: DIABETES SERVICES | Age: 78
End: 2023-01-16
Payer: MEDICARE

## 2023-01-16 DIAGNOSIS — E11.65 TYPE 2 DIABETES MELLITUS WITH HYPERGLYCEMIA, WITH LONG-TERM CURRENT USE OF INSULIN (HCC): Primary | ICD-10-CM

## 2023-01-16 DIAGNOSIS — Z79.4 TYPE 2 DIABETES MELLITUS WITH HYPERGLYCEMIA, WITH LONG-TERM CURRENT USE OF INSULIN (HCC): Primary | ICD-10-CM

## 2023-01-16 PROCEDURE — G0108 DIAB MANAGE TRN  PER INDIV: HCPCS

## 2023-01-16 NOTE — PROGRESS NOTES
[facility] Program for Diabetes Health  Diabetes Self-Management Education & Support Program  Encounter Note    SUMMARY  Diabetes self-care management training was completed related to healthy eating. The participant will return on January 23 to continue DSMES related to healthy eating. The participant did identify SMART Goal(s) and will practice knowledge and skills related to reducing risks, healthy eating and monitoring, being active and medications, and healthy coping and problem solving to improve diabetes self-management. EVALUATION:  Mr. Ana Rosa Perla has not taken semaglutide for several weeks, he states he receives text messages from his [address] that the rx refill is ready, but they have nothing for him when he arrives. It appears his prescription should have run out prior to December. I have asked him to have his pharmacy contact Dr. Paulette Carlisle to request a refill. He is taking only the base dose of novolog at meals (36/28/28), he is not adding units as directed based on pre-prandial BGs, I provided the scale to him today and he is ready begin using it tonight. Mr. Ana Rosa Perla states he is scanning the [employer] 4-5 times daily and has had 3 alerts for BGs >250. We have not yet covered monitoring so he is not using the data purposefully at this point, however he is finding the information interesting and is willing to use it the future. Today we started Healthy Eating, Mr. Ana Rosa Perla expressed understanding of all topics, see boxes below for details. He is ready to take a look at his meal choices this week to see how those compare to the Healthy Plate model. RECOMMENDATIONS:  Restart Ozempic as soon as possible. Use correction scale starting today as needed. Ask nephrologist for referral to [facility] nutrition to discuss CKD nutrition guidelines.      TOPICS DISCUSSED TODAY:  WHAT CAN I EAT? 60      Next provider visit is scheduled for 1/23/2023       SMART GOAL(S)   Compare one breakfast, lunch, and dinner to the Healthy Plate model over the next week to see where carb portions should be adjusted. ACHIEVEMENT OF GOAL(S) : 0-24%    2. ACHIEVEMENT OF GOAL(S) :      3.     ACHIEVEMENT OF GOAL(S) :         DATE DSMES TOPIC EVALUATION     1/16/2023 WHAT CAN I EAT? General principles   Determining a healthy weight   Nutritional terms & tools   Healthy Plate method   Carbohydrate Counting   Reading food labels   Free apps   Pregnancy recommendations      The participant   Uses Healthy Plate principles in constructing meals: Yes, in progress  Reads food labels in choosing acceptable foods: No, to be covered at future appointment. The participant needs to address -see notes above. Andra Krishnamurthy RN on 1/16/2023 at 3:41 PM    I have personally reviewed the health record, including provider notes, laboratory data and current medications before making these care and education recommendations. The time spent in this effort is included in the total time.   Total minutes: 60

## 2023-01-17 DIAGNOSIS — E11.65 TYPE 2 DIABETES MELLITUS WITH HYPERGLYCEMIA, WITH LONG-TERM CURRENT USE OF INSULIN (HCC): ICD-10-CM

## 2023-01-17 DIAGNOSIS — Z79.4 TYPE 2 DIABETES MELLITUS WITH HYPERGLYCEMIA, WITH LONG-TERM CURRENT USE OF INSULIN (HCC): ICD-10-CM

## 2023-01-17 RX ORDER — SEMAGLUTIDE 2.68 MG/ML
2 INJECTION, SOLUTION SUBCUTANEOUS
Qty: 1 PEN | Refills: 5 | Status: SHIPPED | OUTPATIENT
Start: 2023-01-17

## 2023-01-23 ENCOUNTER — CLINICAL SUPPORT (OUTPATIENT)
Dept: DIABETES SERVICES | Age: 78
End: 2023-01-23
Payer: MEDICARE

## 2023-01-23 DIAGNOSIS — Z79.4 TYPE 2 DIABETES MELLITUS WITH HYPERGLYCEMIA, WITH LONG-TERM CURRENT USE OF INSULIN (HCC): Primary | ICD-10-CM

## 2023-01-23 DIAGNOSIS — E11.65 TYPE 2 DIABETES MELLITUS WITH HYPERGLYCEMIA, WITH LONG-TERM CURRENT USE OF INSULIN (HCC): Primary | ICD-10-CM

## 2023-01-26 NOTE — PROGRESS NOTES
[employer] [facility] Program for Diabetes Health  Diabetes Self-Management Education & Support Program  Encounter Note    SUMMARY  Diabetes self-care management training was completed related to healthy eating. The participant will return on January 30 to continue DSMES related to monitoring and taking medications. The participant did not identify SMART Goal(s) and will practice knowledge and skills related to reducing risks, healthy eating and monitoring, healthy coping and problem solving, and medications to improve diabetes self-management. EVALUATION:  Mr. Mirna Camarillo will restart Ozempic today. He partially met his SMART Goal to compare one breakfast, lunch, and dinner to the Healthy Plate model over the past week to see where carb portions should be adjusted, he used the model at dinner on one occasion. We reviewed Healthy Eating today, he is ready to work building meals more like Healthy Plate and eliminate fruit juice. He is not consistently using the correction scale to before meals to adjust the novolog dose. He is alerted to \"high\" BGs after most meals by the [facility], he is above target range 70% for the past 14 days, he was reluctant to share the data with me because of this and expressed a desire to apply what we have covered more consistently. Mr. Oscar Bernstein wife was present for the last portion of the appointment, she assembled a new applicator and sensor to replace the one he was wearing and observed how to apply it, she feels confident she can do this for Mr. Mirna Camarillo at home in the future. RECOMMENDATIONS:  Use Healthy Plate at most meals. Follow and use correction scale for novolog. Continue to take Ozempic consistently. TOPICS DISCUSSED TODAY:  WHAT CAN I EAT? 60      Next provider visit is scheduled for 1/30/2023.        SMART GOAL(S)  Compare one breakfast, lunch, and dinner to the Healthy Plate model over the next week to see where carb portions should be adjusted. ACHIEVEMENT OF GOAL(S) : 25-49%    2. ACHIEVEMENT OF GOAL(S) :      3.     ACHIEVEMENT OF GOAL(S) :         DATE DSMES TOPIC EVALUATION     1/26/2023 WHAT CAN I EAT? General principles   Determining a healthy weight   Nutritional terms & tools   Healthy Plate method   Carbohydrate Counting   Reading food labels   Free apps   Pregnancy recommendations      The participant   Uses Healthy Plate principles in constructing meals: Yes, needs improvement  Reads food labels in choosing acceptable foods: Yes, however Healthy Plate is better solution for him. The participant needs to address -see recommendations above. Manuelito Mae RN on 1/26/2023 at 8:15 AM    I have personally reviewed the health record, including provider notes, laboratory data and current medications before making these care and education recommendations. The time spent in this effort is included in the total time.   Total minutes: 60

## 2023-02-20 ENCOUNTER — CLINICAL SUPPORT (OUTPATIENT)
Dept: DIABETES SERVICES | Age: 78
End: 2023-02-20
Payer: MEDICARE

## 2023-02-20 DIAGNOSIS — E11.65 TYPE 2 DIABETES MELLITUS WITH HYPERGLYCEMIA, WITH LONG-TERM CURRENT USE OF INSULIN (HCC): Primary | ICD-10-CM

## 2023-02-20 DIAGNOSIS — Z79.4 TYPE 2 DIABETES MELLITUS WITH HYPERGLYCEMIA, WITH LONG-TERM CURRENT USE OF INSULIN (HCC): Primary | ICD-10-CM

## 2023-02-20 PROCEDURE — G0108 DIAB MANAGE TRN  PER INDIV: HCPCS

## 2023-02-20 NOTE — PROGRESS NOTES
3 [facility]  Diabetes Self-Management Education & Support Program  Encounter Note    SUMMARY  Diabetes self-care management training was completed related to monitoring and taking medications. The participant will return on March 6 to continue DSMES related to physical activity, healthy coping, and problem solving. The participant did identify SMART Goal(s) and will practice knowledge and skills related to reducing risks, healthy eating and monitoring, healthy coping and problem solving, and medications to improve diabetes self-management. EVALUATION:  Today we covered Monitoring and Medication, Mr. Jamey Carroll expressed understanding of all topics, see boxes below for details. He started Ozempic about 3 weeks ago, states he sees some improvement in fasting BGs, he did not bring his Nu3yle Robin reader so I have no data to share since the last appointment 1/26/2023. He understands that he will increase his Ozemipic dose as directed. He is also taking Toujeo Max U300 110 units daily, and novolog before meals (36/28/28). He states BGs are typically 140-200 before meals, he is not using the correction scale. I have verified with Dr. Ronan Callejas he is to use this and I will call him to discuss. He is ready to observe his pre/postprandials via the [facility] and use that information to make changes to his meal choices for better control. He is working on using Healthy Plate to control carb portions. He has reduced the amount of fruit juice he drinks, will work on cutting out completely. RECOMMENDATIONS:  Initiate correction scale for novolog doses. TOPICS DISCUSSED TODAY:  HOW CAN BLOOD GLUCOSE MONITORING HELP ME? 27  HOW DO MY DIABETES MEDICATIONS WORK? 30      Next provider visit is scheduled for 3/6/2023 for DSMES.        SMART GOAL(S)  Compare one breakfast, lunch, and dinner to the Healthy Plate model over the next week to see where carb portions should be adjusted. ACHIEVEMENT OF GOAL(S) : 25-49%    2. Call Dr. Judge Hernández office to verify appointment date and time before next [facility] appointment on 3/6/2023. ACHIEVEMENT OF GOAL(S) :  0-24%    3. ACHIEVEMENT OF GOAL(S) :         DATE DSMES TOPIC EVALUATION     2/20/2023 HOW CAN BLOOD GLUCOSE MONITORING HELP ME? Value of blood glucose monitoring   Realistic expectations   Blood glucose monitoring targets   Target adjustments   Setting a1c & blood glucose targets with provider   Meter selection    Technique for obtaining blood droplet   Blood glucose testing sites   Determining best times to test   Pregnancy recommendations   Data sharing with provider        The participant   Can demonstrate their glucometer procedure: Yes  Logs their BG readings:  Yes    The participant needs to address using FSL data to inform behavior changes around meal choices. [facility]     2/20/2023 HOW DO MY DIABETES MEDICATIONS WORK? Type 1 medications & methods   Insulin injections   Injection sites   Type 2 medications   Oral agents   GLP-1 agonists   Hypoglycemia symptoms & treatment   Glucagon emergency kits   General guidance regarding insulin use whether Type 1, 2 or gestational diabetes   Storage of insulin   Disposal    Traveling with medications   Barriers to medication adherence      The participant   Can describe the expected action & side effects of prescribed diabetes medications: Yes  Can demonstrate injection technique (if applicable): Yes    The participant needs to address initiating the novolog correction scale for better BG control after meals. Jerald Anderson RN on 2/20/2023 at 4:01 PM    I have personally reviewed the health record, including provider notes, laboratory data and current medications before making these care and education recommendations. The time spent in this effort is included in the total time.   Total minutes: 60

## 2023-02-22 ENCOUNTER — TELEPHONE (OUTPATIENT)
Dept: DIABETES SERVICES | Age: 78
End: 2023-02-22

## 2023-03-06 ENCOUNTER — CLINICAL SUPPORT (OUTPATIENT)
Dept: DIABETES SERVICES | Age: 78
End: 2023-03-06
Payer: MEDICARE

## 2023-03-06 DIAGNOSIS — Z79.4 TYPE 2 DIABETES MELLITUS WITH HYPERGLYCEMIA, WITH LONG-TERM CURRENT USE OF INSULIN (HCC): Primary | ICD-10-CM

## 2023-03-06 DIAGNOSIS — E11.65 TYPE 2 DIABETES MELLITUS WITH HYPERGLYCEMIA, WITH LONG-TERM CURRENT USE OF INSULIN (HCC): Primary | ICD-10-CM

## 2023-03-06 PROCEDURE — G0108 DIAB MANAGE TRN  PER INDIV: HCPCS

## 2023-03-06 NOTE — PROGRESS NOTES
[facility]  Diabetes Self-Management Education & Support Program  Encounter Note    SUMMARY  Diabetes self-care management training was completed related to healthy coping and problem solving. The participant will return on TBD for the [facility] follow-up appointment. The participant did identify SMART Goal(s) and will practice knowledge and skills related to reducing risks, healthy eating and monitoring, healthy coping and problem solving, and medications to improve diabetes self-management. EVALUATION:  Mr. Hallie Infante did not share his Freestyle data with me, he states he has taken correction novolog only once since I spoke to him last week about initiating the correction scale. He continues to work on cutting out fruit juice at breakfast. He has not yet made an appointment with Dr. Stefan Casas. Today we covered Healthy Coping and Problem Solving, he expressed understanding of all topics, see boxes below for details. He shared his wife and family are great support system and he does not frequently feel stressed out, he denies symptoms of anxiety and depression. We reviewed how to use the 15-15 Rule to treat hypoglycemia. We also discussed chair exercises and practiced some in the office, he would like to try these at home. At the time of pre-program assessment Mr. Hallie Infante was experiencing terrible back pain, this has improved, but he is not yet ready for weight bearing activities, he declined further education about physical activity. He does not have a DM follow-up appointment or labs scheduled with this PCP, he plans to set this up. I will call him in early April to see if this has been done and set up the [facility] follow-up appointment at that time. RECOMMENDATIONS:  Actively monitor BGs at fasting, before meals, and 2 hours after meals to learn more about how meal choices are impacting BG and if correction doses of novolog are appropriate or need to be adjusted.     Try chair exercises 1-2x this week, see how you feel the next day, careful of back. Continue to work on [employer], see below. TOPICS DISCUSSED TODAY:  HOW DO I FIND SUPPORT TO TACKLE THIS CONDITION? 30  HOW DO I FIGURE OUT WHAT'S INFLUENCING MY BLOOD GLUCOSES? 30      Next provider visit is scheduled for TBD. SMART GOAL(S)  Compare one breakfast, lunch, and dinner to the Healthy Plate model over the next week to see where carb portions should be adjusted. ACHIEVEMENT OF GOAL(S) : 25-49%    2. Call Dr. Tom House office to verify appointment date and time before next [facility] appointment on 3/6/2023. ACHIEVEMENT OF GOAL(S) :  0-24%    3. ACHIEVEMENT OF GOAL(S) :         DATE [facility] TOPIC EVALUATION     3/6/2023 HOW DO I FIND SUPPORT TO TACKLE THIS CONDITION? Normal responses to diabetes diagnosis or complication   Shock   Anger & resentment   Guilt/self-blame   Sadness & worry   Depression    Anxiety   Pregnancy   Constructive strategies to normal responses    Exploring feelings & attitudes   Motivation: Cost versus benefits analysis   Problem-solving: Chain analysis   Obtaining support: Communicating   Family & friends   Health team   Community   Stress   Symptoms   Managing stress   Fill your tool kit        The participant can identify people that support them in caring for their diabetes health: patient and his wife, adult children, and his health care team.      The participant would like to pursue the following in keeping themselves healthy after completing the program:  Increasing physical activity using chair exercises so that he will be better conditioned to walk once the weather warms. The participant needs to address n/a, stress is well managed, positive outlook on the future. DATE [facility] TOPIC EVALUATION     3/6/2023 HOW DO I FIGURE OUT WHAT'S INFLUENCING MY BLOOD GLUCOSES?    Problem solving using SOAR   Set goals   Sort options   Arrive at a plan   Reaffirm plan   Common problems in diabetes self-care   Hypoglycemia   Hyperglycemia   Sick days   Pattern management   Disaster preparedness plan        The participant has an action plan for   Hypoglycemia: Yes  Hyperglycemia: Yes  Sick days: Yes  During disasters: Yes    The participant needs to address actively observing his BGs using the Freestyle Sukumar to learn more about his BG pattern and recognize opportunities to make changes to meal choices if possible. Rosana Sawant RN on 3/6/2023 at 3:38 PM    I have personally reviewed the health record, including provider notes, laboratory data and current medications before making these care and education recommendations. The time spent in this effort is included in the total time.   Total minutes: 60

## 2023-10-06 LAB
HBA1C MFR BLD HPLC: 9.7 %
MICROALBUMIN/CREATININE RATIO, EXTERNAL: 115

## 2023-12-05 NOTE — PROGRESS NOTES
Chief Complaint   Patient presents with    Diabetes       Patient was last seen: over a year ago  10/18/2022 (NP) OVER-DUE      General:   Was using CGM until a few weeks ago - when not refilled b/c not here (uses phone)  Off ozempic - does not know why     Medicate, no deductible, has supplement, no donut hole issues       A1c: last a1c was 9.7 10/2023    DM Medications:     Toujeo Max 110 units daily - rotating injections  Novolog 36/28/28 15 min before eating   Also add Novolog before meals if sugars are high based on this scale     NOT USING SCALE  150-200 4 units  200-250 8 units  250-300 12 units  Over 300  16 units    Last Changes: :see last note     Sugar Checks: checks up to 4 x day     AM: reports:  unable to give me any numbers     PM: reports:      LOWs:  has low sugars     DIET: completed the [facility]     EXERCISE: does not exercise, exercise limited due to pain     HTN: on ACE-I, on Ca-Blk, on clonidine, HTN followed by PCP, HTN followed by Cardiology     LIPIDS: on statin, lipids followed by PCP, lipids followed by Cardiology    RENAL: **has severe renal insufficiency**, is followed by Nephrology, is on an ACE-I     EYES: has no retinopathy, overdue for eye exam     DENTAL:  overdue for dentist     FEET: sees podiatry, has no current issues     HEART:  no chest pain, shortness of breath or claudication, has no cardiac history, is followed by Cardiology for HTN    ASA:  is on aspirin     SYMPTOMS: no polyuria, thirst or blurred vision     THYROID: no known thyroid issue    DIABETES HISTORY:   From initial visit: 10/18/2022    DM 15-20 years  Metformin early on  But then steroids and sugars 500  Put on insulin then      May have been on glipizide  No DPP4, no SGLT, no TZD  On ozempic for ~ 6 mo        LABS/STUDIES:   5/9/22 a1c 8.2  9/13/22 GFR 26, ANNA-r 95, a1c 9.4    10/5/23 GFR 22%, ANNA-r 115, A1c 9.7, Vit D 21.3, Phos 2.8, Mag 2.7,     No results found for: \"[id number]\"    Lab

## 2023-12-06 ENCOUNTER — OFFICE VISIT (OUTPATIENT)
Age: 78
End: 2023-12-06
Payer: MEDICARE

## 2023-12-06 VITALS — HEIGHT: 70 IN | WEIGHT: 238 LBS | BODY MASS INDEX: 34.07 KG/M2

## 2023-12-06 DIAGNOSIS — Z79.4 TYPE 2 DIABETES MELLITUS WITH HYPERGLYCEMIA, WITH LONG-TERM CURRENT USE OF INSULIN (HCC): Primary | ICD-10-CM

## 2023-12-06 DIAGNOSIS — E11.65 TYPE 2 DIABETES MELLITUS WITH HYPERGLYCEMIA, WITH LONG-TERM CURRENT USE OF INSULIN (HCC): Primary | ICD-10-CM

## 2023-12-06 PROCEDURE — 99204 OFFICE O/P NEW MOD 45 MIN: CPT | Performed by: INTERNAL MEDICINE

## 2023-12-06 PROCEDURE — G8428 CUR MEDS NOT DOCUMENT: HCPCS | Performed by: INTERNAL MEDICINE

## 2023-12-06 PROCEDURE — G8484 FLU IMMUNIZE NO ADMIN: HCPCS | Performed by: INTERNAL MEDICINE

## 2023-12-06 PROCEDURE — 4004F PT TOBACCO SCREEN RCVD TLK: CPT | Performed by: INTERNAL MEDICINE

## 2023-12-06 PROCEDURE — 1123F ACP DISCUSS/DSCN MKR DOCD: CPT | Performed by: INTERNAL MEDICINE

## 2023-12-06 PROCEDURE — G8417 CALC BMI ABV UP PARAM F/U: HCPCS | Performed by: INTERNAL MEDICINE

## 2023-12-06 RX ORDER — CARVEDILOL 12.5 MG/1
TABLET ORAL
COMMUNITY
Start: 2023-12-03

## 2023-12-06 RX ORDER — PEN NEEDLE, DIABETIC 32GX 5/32"
NEEDLE, DISPOSABLE MISCELLANEOUS
COMMUNITY
Start: 2023-12-03

## 2023-12-06 NOTE — PATIENT INSTRUCTIONS
Use Humalog scale before meals:  150-200 4 units  200-250 8 units  250-300 12 units  Over 300  16 units

## 2024-01-31 RX ORDER — INSULIN ASPART 100 [IU]/ML
INJECTION, SOLUTION INTRAVENOUS; SUBCUTANEOUS
Qty: 135 ML | Refills: 2 | Status: SHIPPED | OUTPATIENT
Start: 2024-01-31

## 2024-02-22 RX ORDER — INSULIN GLARGINE 300 U/ML
INJECTION, SOLUTION SUBCUTANEOUS
Qty: 12 ML | Refills: 5 | Status: SHIPPED | OUTPATIENT
Start: 2024-02-22

## 2024-03-06 ENCOUNTER — OFFICE VISIT (OUTPATIENT)
Age: 79
End: 2024-03-06
Payer: MEDICARE

## 2024-03-06 VITALS
BODY MASS INDEX: 34.07 KG/M2 | DIASTOLIC BLOOD PRESSURE: 64 MMHG | WEIGHT: 238 LBS | HEART RATE: 69 BPM | HEIGHT: 70 IN | SYSTOLIC BLOOD PRESSURE: 162 MMHG

## 2024-03-06 DIAGNOSIS — Z79.4 TYPE 2 DIABETES MELLITUS WITH STAGE 4 CHRONIC KIDNEY DISEASE, WITH LONG-TERM CURRENT USE OF INSULIN (HCC): ICD-10-CM

## 2024-03-06 DIAGNOSIS — E78.2 MIXED HYPERLIPIDEMIA: ICD-10-CM

## 2024-03-06 DIAGNOSIS — N18.4 TYPE 2 DIABETES MELLITUS WITH STAGE 4 CHRONIC KIDNEY DISEASE, WITH LONG-TERM CURRENT USE OF INSULIN (HCC): ICD-10-CM

## 2024-03-06 DIAGNOSIS — R80.9 TYPE 2 DIABETES MELLITUS WITH MICROALBUMINURIA, WITH LONG-TERM CURRENT USE OF INSULIN (HCC): ICD-10-CM

## 2024-03-06 DIAGNOSIS — Z79.4 TYPE 2 DIABETES MELLITUS WITH MICROALBUMINURIA, WITH LONG-TERM CURRENT USE OF INSULIN (HCC): ICD-10-CM

## 2024-03-06 DIAGNOSIS — E11.65 TYPE 2 DIABETES MELLITUS WITH HYPERGLYCEMIA, WITH LONG-TERM CURRENT USE OF INSULIN (HCC): Primary | ICD-10-CM

## 2024-03-06 DIAGNOSIS — I10 ESSENTIAL (PRIMARY) HYPERTENSION: ICD-10-CM

## 2024-03-06 DIAGNOSIS — E11.22 TYPE 2 DIABETES MELLITUS WITH STAGE 4 CHRONIC KIDNEY DISEASE, WITH LONG-TERM CURRENT USE OF INSULIN (HCC): ICD-10-CM

## 2024-03-06 DIAGNOSIS — Z79.4 TYPE 2 DIABETES MELLITUS WITH HYPERGLYCEMIA, WITH LONG-TERM CURRENT USE OF INSULIN (HCC): Primary | ICD-10-CM

## 2024-03-06 DIAGNOSIS — E11.29 TYPE 2 DIABETES MELLITUS WITH MICROALBUMINURIA, WITH LONG-TERM CURRENT USE OF INSULIN (HCC): ICD-10-CM

## 2024-03-06 PROCEDURE — 4004F PT TOBACCO SCREEN RCVD TLK: CPT | Performed by: INTERNAL MEDICINE

## 2024-03-06 PROCEDURE — 1123F ACP DISCUSS/DSCN MKR DOCD: CPT | Performed by: INTERNAL MEDICINE

## 2024-03-06 PROCEDURE — G8417 CALC BMI ABV UP PARAM F/U: HCPCS | Performed by: INTERNAL MEDICINE

## 2024-03-06 PROCEDURE — G8428 CUR MEDS NOT DOCUMENT: HCPCS | Performed by: INTERNAL MEDICINE

## 2024-03-06 PROCEDURE — 3078F DIAST BP <80 MM HG: CPT | Performed by: INTERNAL MEDICINE

## 2024-03-06 PROCEDURE — 3077F SYST BP >= 140 MM HG: CPT | Performed by: INTERNAL MEDICINE

## 2024-03-06 PROCEDURE — G2211 COMPLEX E/M VISIT ADD ON: HCPCS | Performed by: INTERNAL MEDICINE

## 2024-03-06 PROCEDURE — 99214 OFFICE O/P EST MOD 30 MIN: CPT | Performed by: INTERNAL MEDICINE

## 2024-03-06 PROCEDURE — G8484 FLU IMMUNIZE NO ADMIN: HCPCS | Performed by: INTERNAL MEDICINE

## 2024-03-06 RX ORDER — SEMAGLUTIDE 2.68 MG/ML
INJECTION, SOLUTION SUBCUTANEOUS
Qty: 3 ML | Refills: 5 | Status: SHIPPED | OUTPATIENT
Start: 2024-03-06

## 2024-03-06 RX ORDER — SEMAGLUTIDE 0.68 MG/ML
INJECTION, SOLUTION SUBCUTANEOUS
Qty: 3 ML | Refills: 0 | Status: SHIPPED | COMMUNITY
Start: 2024-03-06

## 2024-03-06 RX ORDER — SEMAGLUTIDE 1.34 MG/ML
INJECTION, SOLUTION SUBCUTANEOUS
Qty: 3 ML | Refills: 1 | Status: SHIPPED | OUTPATIENT
Start: 2024-03-06

## 2024-03-06 NOTE — PATIENT INSTRUCTIONS
Begin ozempic 0.25 mg once a week for 2 weeks (sample given)  Then increase to 0.5mg weekly if tolerated -the sample pen should last 5 weeks  You can then fill the 1.0mg dose and use for a month (written Rx given)  Then the 2.0mg dose (written rx given)    Humalog scale before meals:  150-200 4 units  200-250 8 units  250-300 12 units  Over 300  16 units  (If high after a meal or bedtime only use half)

## 2024-03-06 NOTE — PROGRESS NOTES
Chief Complaint   Patient presents with    Diabetes       Patient was last seen: 12/6/2023     General:   Was using CGM until a few weeks ago - when not refilled b/c not here  Off ozempic - does not know why     Medicate, no deductible, has supplement, no donut hole issues       A1c: last a1c was 8.9 1/17/24    DM Medications:    Toujeo Max 110 units daily - rotating injections  Novolog 36/28/28 15 min before eating     Also add Novolog before meals if sugars are high based on this scale  NOT USING SCALE all the time   150-200 4 units  200-250 8 units  250-300 12 units  Over 300  16 units    Last Changes: :see last note     Sugar Checks: checks up to 4 x day   Can't recall sugar readings  Does get high alerts so having > 260       AM: reports:  see above    PM: reports:  see above     LOWs:  has low sugars     DIET: completed the [facility]     EXERCISE: does not exercise, exercise limited due to pain     HTN: on ACE-I, on B-Blk, on diuretics, on clonidine, HTN followed by PCP, HTN followed by Cardiology     LIPIDS: on statin, on fenofibrate, lipids followed by PCP, lipids followed by Cardiology    RENAL: **has severe renal insufficiency**, is followed by Nephrology, is on an ACE-I     EYES: has no retinopathy, overdue for eye exam     DENTAL:  has seen dentist in past year     FEET: sees podiatry, has no current issues     HEART:  no chest pain, shortness of breath or claudication, has no cardiac history, is followed by Cardiology for HTN    ASA:  is on aspirin     SYMPTOMS: no polyuria, thirst or blurred vision     THYROID: no known thyroid issue    DIABETES HISTORY:   From initial visit: 10/18/2022    DM 15-20 years  Metformin early on  But then steroids and sugars 500  Put on insulin then      May have been on glipizide  No DPP4, no SGLT, no TZD  On ozempic for ~ 6 mo        LABS/STUDIES:   5/9/22 a1c 8.2  9/13/22 GFR 26, ANNA-r 95, a1c 9.4    10/5/23 GFR 22%, ANNA-r 115, A1c 9.7, Vit D 21.3, Phos

## 2024-03-19 ENCOUNTER — HOSPITAL ENCOUNTER (OUTPATIENT)
Age: 79
Discharge: HOME | End: 2024-03-22
Payer: MEDICARE

## 2024-03-19 DIAGNOSIS — R04.2 COUGHING UP BLOOD: ICD-10-CM

## 2024-03-19 DIAGNOSIS — M25.562 LEFT KNEE PAIN, UNSPECIFIED CHRONICITY: ICD-10-CM

## 2024-03-19 PROCEDURE — 73562 X-RAY EXAM OF KNEE 3: CPT

## 2024-03-19 PROCEDURE — 71046 X-RAY EXAM CHEST 2 VIEWS: CPT

## 2024-06-03 ENCOUNTER — HOSPITAL ENCOUNTER (EMERGENCY)
Facility: HOSPITAL | Age: 79
Discharge: HOME | End: 2024-06-03
Attending: EMERGENCY MEDICINE
Payer: MEDICARE

## 2024-06-03 VITALS
DIASTOLIC BLOOD PRESSURE: 70 MMHG | HEART RATE: 75 BPM | HEIGHT: 70 IN | SYSTOLIC BLOOD PRESSURE: 150 MMHG | BODY MASS INDEX: 32.93 KG/M2 | WEIGHT: 230 LBS | OXYGEN SATURATION: 93 % | RESPIRATION RATE: 18 BRPM | TEMPERATURE: 97.9 F

## 2024-06-03 DIAGNOSIS — N18.32 STAGE 3B CHRONIC KIDNEY DISEASE (HCC): ICD-10-CM

## 2024-06-03 DIAGNOSIS — M70.52 BURSITIS OF OTHER BURSA OF LEFT KNEE: Primary | ICD-10-CM

## 2024-06-03 DIAGNOSIS — M70.32 BURSITIS OF LEFT ELBOW, UNSPECIFIED BURSA: ICD-10-CM

## 2024-06-03 LAB
ALBUMIN SERPL-MCNC: 3.6 G/DL (ref 3.5–5)
ALBUMIN/GLOB SERPL: 0.8 (ref 1.1–2.2)
ALP SERPL-CCNC: 45 U/L (ref 45–117)
ALT SERPL-CCNC: 38 U/L (ref 12–78)
ANION GAP SERPL CALC-SCNC: 4 MMOL/L (ref 5–15)
APPEARANCE UR: CLEAR
AST SERPL-CCNC: 22 U/L (ref 15–37)
BACTERIA URNS QL MICRO: NEGATIVE /HPF
BASOPHILS # BLD: 0 K/UL (ref 0–0.1)
BASOPHILS NFR BLD: 0 % (ref 0–1)
BILIRUB SERPL-MCNC: 0.4 MG/DL (ref 0.2–1)
BILIRUB UR QL: NEGATIVE
BUN SERPL-MCNC: 43 MG/DL (ref 6–20)
BUN/CREAT SERPL: 17 (ref 12–20)
CALCIUM SERPL-MCNC: 9.8 MG/DL (ref 8.5–10.1)
CHLORIDE SERPL-SCNC: 107 MMOL/L (ref 97–108)
CO2 SERPL-SCNC: 27 MMOL/L (ref 21–32)
COLOR UR: ABNORMAL
CREAT SERPL-MCNC: 2.46 MG/DL (ref 0.7–1.3)
DIFFERENTIAL METHOD BLD: ABNORMAL
EOSINOPHIL # BLD: 0.2 K/UL (ref 0–0.4)
EOSINOPHIL NFR BLD: 2 % (ref 0–7)
EPITH CASTS URNS QL MICRO: ABNORMAL /LPF
ERYTHROCYTE [DISTWIDTH] IN BLOOD BY AUTOMATED COUNT: 12.6 % (ref 11.5–14.5)
GLOBULIN SER CALC-MCNC: 4.3 G/DL (ref 2–4)
GLUCOSE SERPL-MCNC: 128 MG/DL (ref 65–100)
GLUCOSE UR STRIP.AUTO-MCNC: NEGATIVE MG/DL
HCT VFR BLD AUTO: 27.9 % (ref 36.6–50.3)
HGB BLD-MCNC: 9 G/DL (ref 12.1–17)
HGB UR QL STRIP: NEGATIVE
HYALINE CASTS URNS QL MICRO: ABNORMAL /LPF (ref 0–2)
IMM GRANULOCYTES # BLD AUTO: 0 K/UL (ref 0–0.04)
IMM GRANULOCYTES NFR BLD AUTO: 0 % (ref 0–0.5)
KETONES UR QL STRIP.AUTO: NEGATIVE MG/DL
LEUKOCYTE ESTERASE UR QL STRIP.AUTO: NEGATIVE
LYMPHOCYTES # BLD: 2.8 K/UL (ref 0.8–3.5)
LYMPHOCYTES NFR BLD: 36 % (ref 12–49)
MCH RBC QN AUTO: 30.3 PG (ref 26–34)
MCHC RBC AUTO-ENTMCNC: 32.3 G/DL (ref 30–36.5)
MCV RBC AUTO: 93.9 FL (ref 80–99)
MONOCYTES # BLD: 0.8 K/UL (ref 0–1)
MONOCYTES NFR BLD: 10 % (ref 5–13)
NEUTS SEG # BLD: 4 K/UL (ref 1.8–8)
NEUTS SEG NFR BLD: 52 % (ref 32–75)
NITRITE UR QL STRIP.AUTO: NEGATIVE
NRBC # BLD: 0 K/UL (ref 0–0.01)
NRBC BLD-RTO: 0 PER 100 WBC
PH UR STRIP: 5.5 (ref 5–8)
PLATELET # BLD AUTO: 261 K/UL (ref 150–400)
POTASSIUM SERPL-SCNC: 4.5 MMOL/L (ref 3.5–5.1)
PROT SERPL-MCNC: 7.9 G/DL (ref 6.4–8.2)
PROT UR STRIP-MCNC: 30 MG/DL
RBC # BLD AUTO: 2.97 M/UL (ref 4.1–5.7)
RBC #/AREA URNS HPF: ABNORMAL /HPF (ref 0–5)
RBC MORPH BLD: ABNORMAL
SODIUM SERPL-SCNC: 138 MMOL/L (ref 136–145)
SP GR UR REFRACTOMETRY: 1.01
URINE CULTURE IF INDICATED: ABNORMAL
UROBILINOGEN UR QL STRIP.AUTO: 1 EU/DL (ref 0.2–1)
WBC # BLD AUTO: 7.8 K/UL (ref 4.1–11.1)
WBC URNS QL MICRO: ABNORMAL /HPF (ref 0–4)

## 2024-06-03 PROCEDURE — 96374 THER/PROPH/DIAG INJ IV PUSH: CPT

## 2024-06-03 PROCEDURE — 81001 URINALYSIS AUTO W/SCOPE: CPT

## 2024-06-03 PROCEDURE — 6360000002 HC RX W HCPCS: Performed by: EMERGENCY MEDICINE

## 2024-06-03 PROCEDURE — 6370000000 HC RX 637 (ALT 250 FOR IP): Performed by: EMERGENCY MEDICINE

## 2024-06-03 PROCEDURE — 36415 COLL VENOUS BLD VENIPUNCTURE: CPT

## 2024-06-03 PROCEDURE — 80053 COMPREHEN METABOLIC PANEL: CPT

## 2024-06-03 PROCEDURE — 99284 EMERGENCY DEPT VISIT MOD MDM: CPT

## 2024-06-03 PROCEDURE — 96375 TX/PRO/DX INJ NEW DRUG ADDON: CPT

## 2024-06-03 PROCEDURE — 85025 COMPLETE CBC W/AUTO DIFF WBC: CPT

## 2024-06-03 RX ORDER — HYDROCODONE BITARTRATE AND ACETAMINOPHEN 5; 325 MG/1; MG/1
1 TABLET ORAL EVERY 6 HOURS PRN
Qty: 15 TABLET | Refills: 0 | Status: SHIPPED | OUTPATIENT
Start: 2024-06-03 | End: 2024-06-08

## 2024-06-03 RX ORDER — PREDNISONE 20 MG/1
40 TABLET ORAL DAILY
Qty: 14 TABLET | Refills: 0 | Status: SHIPPED | OUTPATIENT
Start: 2024-06-03 | End: 2024-06-10

## 2024-06-03 RX ORDER — METHYLPREDNISOLONE SODIUM SUCCINATE 125 MG/2ML
125 INJECTION, POWDER, LYOPHILIZED, FOR SOLUTION INTRAMUSCULAR; INTRAVENOUS ONCE
Status: COMPLETED | OUTPATIENT
Start: 2024-06-03 | End: 2024-06-03

## 2024-06-03 RX ORDER — OXYCODONE HYDROCHLORIDE AND ACETAMINOPHEN 5; 325 MG/1; MG/1
2 TABLET ORAL
Status: COMPLETED | OUTPATIENT
Start: 2024-06-03 | End: 2024-06-03

## 2024-06-03 RX ORDER — KETOROLAC TROMETHAMINE 30 MG/ML
15 INJECTION, SOLUTION INTRAMUSCULAR; INTRAVENOUS
Status: COMPLETED | OUTPATIENT
Start: 2024-06-03 | End: 2024-06-03

## 2024-06-03 RX ADMIN — OXYCODONE HYDROCHLORIDE AND ACETAMINOPHEN 2 TABLET: 5; 325 TABLET ORAL at 13:59

## 2024-06-03 RX ADMIN — KETOROLAC TROMETHAMINE 15 MG: 30 INJECTION, SOLUTION INTRAMUSCULAR at 13:59

## 2024-06-03 RX ADMIN — METHYLPREDNISOLONE SODIUM SUCCINATE 125 MG: 125 INJECTION INTRAMUSCULAR; INTRAVENOUS at 14:00

## 2024-06-03 ASSESSMENT — PAIN - FUNCTIONAL ASSESSMENT: PAIN_FUNCTIONAL_ASSESSMENT: PREVENTS OR INTERFERES SOME ACTIVE ACTIVITIES AND ADLS

## 2024-06-03 ASSESSMENT — PAIN DESCRIPTION - ORIENTATION: ORIENTATION: LEFT

## 2024-06-03 ASSESSMENT — PAIN DESCRIPTION - PAIN TYPE: TYPE: ACUTE PAIN

## 2024-06-03 ASSESSMENT — PAIN DESCRIPTION - LOCATION
LOCATION: BACK;KNEE
LOCATION: ELBOW

## 2024-06-03 ASSESSMENT — PAIN DESCRIPTION - DESCRIPTORS: DESCRIPTORS: ACHING

## 2024-06-03 ASSESSMENT — ENCOUNTER SYMPTOMS
ABDOMINAL PAIN: 0
SHORTNESS OF BREATH: 0

## 2024-06-03 ASSESSMENT — PAIN SCALES - GENERAL
PAINLEVEL_OUTOF10: 8
PAINLEVEL_OUTOF10: 8

## 2024-06-03 ASSESSMENT — PAIN DESCRIPTION - ONSET: ONSET: GRADUAL

## 2024-06-03 ASSESSMENT — PAIN DESCRIPTION - FREQUENCY: FREQUENCY: CONTINUOUS

## 2024-06-03 NOTE — ED NOTES
1538: MD Suzy has reviewed discharge instructions with the patient at this time. The Patient verbalized understanding and denies any further questions. Patient family in Results Lounge to  patient, wheeled out to waiting room at this time.

## 2024-06-03 NOTE — DISCHARGE INSTRUCTIONS
It was a pleasure taking care of you at [facility] Emergency Department today.  We know that when you come to [facility], you are entrusting us with your health, comfort, and safety.  Our physicians and nurses honor that trust, and we truly appreciate the opportunity to care for you and your loved ones.      We also value your feedback.  If you receive a survey about your Emergency Department experience today, please fill it out.  We care about our patients' feedback, and we listen to what you have to say.  Thank you!

## 2024-06-03 NOTE — ED PROVIDER NOTES
EMERGENCY DEPARTMENT HISTORY AND PHYSICAL EXAM    Date: 6/3/2024  Patient Name: Sabas Diaz  Patient Age and Sex: 79 y.o. male  MRN:  [id number]  CSN:  [id number]    History of Present Illness     Chief Complaint   Patient presents with    Knee Pain     Bilateral knee pain with swelling x 1 week. No injury      Elbow Pain     Left elbow pain with swelling x few days. No injury      Flank Pain     Left sided pain with movement. Decreased urination. Denies pain, denies hematuria       History Provided By: Patient    Ability to gather history was limited by:     HPI: Sabas Diaz, 79 y.o. male   Presents with primary complaint of spontaneous onset pain in the left knee and left elbow, starting about a week ago.  Gradual onset, no swelling.  He thinks the joint pain may have been exacerbated by physical therapy that he was doing.  He has no history of gout.  No prior knee surgery, but prior steroid injections to the knee for arthritis.  No recent falls or injuries.  Also complains of some mild pain in the left side/flank region, without any other associated symptoms.  He denied decreased urination or any other urinary symptoms or changes to me.  No abdominal pain.  No nausea or vomiting.  The primary complaint today is the knee and elbow pain      Tobacco Use      Smoking status: Unknown      Smokeless tobacco: Not on file     Past History   The patient's medical, surgical, and social history were reviewed by me today.    Current Medications:  No current facility-administered medications on file prior to encounter.     Current Outpatient Medications on File Prior to Encounter   Medication Sig Dispense Refill    Semaglutide,0.25 or 0.5MG/DOS, (OZEMPIC, 0.25 OR 0.5 MG/DOSE,) 2 MG/3ML SOPN UAD 3 mL 0    OZEMPIC, 1 MG/DOSE, 4 MG/3ML SOPN Inject 1.0 mg subcutaneously every 7 days 3 mL 1    OZEMPIC, 2 MG/DOSE, 8 MG/3ML SOPN sc injection Inject 2 mg subcutaneously every 7 days 3 mL 5    Insulin Glargine, 2 Unit Dial,

## 2024-06-10 ENCOUNTER — HOSPITAL ENCOUNTER (EMERGENCY)
Facility: HOSPITAL | Age: 79
Discharge: HOME | End: 2024-06-10
Attending: EMERGENCY MEDICINE
Payer: MEDICARE

## 2024-06-10 ENCOUNTER — APPOINTMENT (OUTPATIENT)
Facility: HOSPITAL | Age: 79
End: 2024-06-10
Payer: MEDICARE

## 2024-06-10 VITALS
DIASTOLIC BLOOD PRESSURE: 69 MMHG | WEIGHT: 229.5 LBS | RESPIRATION RATE: 21 BRPM | BODY MASS INDEX: 32.86 KG/M2 | HEIGHT: 70 IN | TEMPERATURE: 98.2 F | OXYGEN SATURATION: 96 % | SYSTOLIC BLOOD PRESSURE: 144 MMHG | HEART RATE: 61 BPM

## 2024-06-10 DIAGNOSIS — E11.649 HYPOGLYCEMIC REACTION TO INSULIN IN TYPE 2 DIABETES MELLITUS (HCC): ICD-10-CM

## 2024-06-10 DIAGNOSIS — R10.9 LEFT FLANK PAIN: Primary | ICD-10-CM

## 2024-06-10 LAB
ALBUMIN SERPL-MCNC: 3.4 G/DL (ref 3.5–5)
ALBUMIN/GLOB SERPL: 0.9 (ref 1.1–2.2)
ALP SERPL-CCNC: 43 U/L (ref 45–117)
ALT SERPL-CCNC: 27 U/L (ref 12–78)
ANION GAP SERPL CALC-SCNC: 4 MMOL/L (ref 5–15)
APPEARANCE UR: CLEAR
AST SERPL-CCNC: 10 U/L (ref 15–37)
BACTERIA URNS QL MICRO: NEGATIVE /HPF
BASOPHILS # BLD: 0 K/UL (ref 0–0.1)
BASOPHILS NFR BLD: 0 % (ref 0–1)
BILIRUB SERPL-MCNC: 0.4 MG/DL (ref 0.2–1)
BILIRUB UR QL: NEGATIVE
BUN SERPL-MCNC: 82 MG/DL (ref 6–20)
BUN/CREAT SERPL: 29 (ref 12–20)
CALCIUM SERPL-MCNC: 9.7 MG/DL (ref 8.5–10.1)
CHLORIDE SERPL-SCNC: 106 MMOL/L (ref 97–108)
CO2 SERPL-SCNC: 29 MMOL/L (ref 21–32)
COLOR UR: NORMAL
CREAT SERPL-MCNC: 2.8 MG/DL (ref 0.7–1.3)
DIFFERENTIAL METHOD BLD: ABNORMAL
EOSINOPHIL # BLD: 0 K/UL (ref 0–0.4)
EOSINOPHIL NFR BLD: 0 % (ref 0–7)
EPITH CASTS URNS QL MICRO: NORMAL /LPF
ERYTHROCYTE [DISTWIDTH] IN BLOOD BY AUTOMATED COUNT: 12.9 % (ref 11.5–14.5)
GLOBULIN SER CALC-MCNC: 3.7 G/DL (ref 2–4)
GLUCOSE BLD STRIP.AUTO-MCNC: 104 MG/DL (ref 65–117)
GLUCOSE BLD STRIP.AUTO-MCNC: 219 MG/DL (ref 65–117)
GLUCOSE BLD STRIP.AUTO-MCNC: 46 MG/DL (ref 65–117)
GLUCOSE BLD STRIP.AUTO-MCNC: 51 MG/DL (ref 65–117)
GLUCOSE BLD STRIP.AUTO-MCNC: 64 MG/DL (ref 65–117)
GLUCOSE SERPL-MCNC: 53 MG/DL (ref 65–100)
GLUCOSE UR STRIP.AUTO-MCNC: NEGATIVE MG/DL
HCT VFR BLD AUTO: 27.9 % (ref 36.6–50.3)
HGB BLD-MCNC: 9.2 G/DL (ref 12.1–17)
HGB UR QL STRIP: NEGATIVE
HYALINE CASTS URNS QL MICRO: NORMAL /LPF (ref 0–5)
IMM GRANULOCYTES # BLD AUTO: 0.1 K/UL (ref 0–0.04)
IMM GRANULOCYTES NFR BLD AUTO: 1 % (ref 0–0.5)
KETONES UR QL STRIP.AUTO: NEGATIVE MG/DL
LEUKOCYTE ESTERASE UR QL STRIP.AUTO: NEGATIVE
LIPASE SERPL-CCNC: 74 U/L (ref 13–75)
LYMPHOCYTES # BLD: 4.3 K/UL (ref 0.8–3.5)
LYMPHOCYTES NFR BLD: 39 % (ref 12–49)
MCH RBC QN AUTO: 30.7 PG (ref 26–34)
MCHC RBC AUTO-ENTMCNC: 33 G/DL (ref 30–36.5)
MCV RBC AUTO: 93 FL (ref 80–99)
MONOCYTES # BLD: 1 K/UL (ref 0–1)
MONOCYTES NFR BLD: 9 % (ref 5–13)
NEUTS SEG # BLD: 5.5 K/UL (ref 1.8–8)
NEUTS SEG NFR BLD: 51 % (ref 32–75)
NITRITE UR QL STRIP.AUTO: NEGATIVE
NRBC # BLD: 0 K/UL (ref 0–0.01)
NRBC BLD-RTO: 0 PER 100 WBC
PH UR STRIP: 5 (ref 5–8)
PLATELET # BLD AUTO: 317 K/UL (ref 150–400)
PMV BLD AUTO: 10 FL (ref 8.9–12.9)
POTASSIUM SERPL-SCNC: 4.1 MMOL/L (ref 3.5–5.1)
PROT SERPL-MCNC: 7.1 G/DL (ref 6.4–8.2)
PROT UR STRIP-MCNC: NEGATIVE MG/DL
RBC # BLD AUTO: 3 M/UL (ref 4.1–5.7)
RBC #/AREA URNS HPF: NORMAL /HPF (ref 0–5)
SERVICE CMNT-IMP: ABNORMAL
SERVICE CMNT-IMP: NORMAL
SODIUM SERPL-SCNC: 139 MMOL/L (ref 136–145)
SP GR UR REFRACTOMETRY: 1.01
URINE CULTURE IF INDICATED: NORMAL
UROBILINOGEN UR QL STRIP.AUTO: 0.2 EU/DL (ref 0.2–1)
WBC # BLD AUTO: 10.9 K/UL (ref 4.1–11.1)
WBC URNS QL MICRO: NORMAL /HPF (ref 0–4)

## 2024-06-10 PROCEDURE — 80053 COMPREHEN METABOLIC PANEL: CPT

## 2024-06-10 PROCEDURE — 83690 ASSAY OF LIPASE: CPT

## 2024-06-10 PROCEDURE — 82962 GLUCOSE BLOOD TEST: CPT

## 2024-06-10 PROCEDURE — 85025 COMPLETE CBC W/AUTO DIFF WBC: CPT

## 2024-06-10 PROCEDURE — 99284 EMERGENCY DEPT VISIT MOD MDM: CPT

## 2024-06-10 PROCEDURE — 81001 URINALYSIS AUTO W/SCOPE: CPT

## 2024-06-10 PROCEDURE — 74176 CT ABD & PELVIS W/O CONTRAST: CPT

## 2024-06-10 PROCEDURE — 36415 COLL VENOUS BLD VENIPUNCTURE: CPT

## 2024-06-10 RX ORDER — CYCLOBENZAPRINE HCL 10 MG
10 TABLET ORAL 2 TIMES DAILY PRN
Qty: 20 TABLET | Refills: 1 | Status: SHIPPED | OUTPATIENT
Start: 2024-06-10 | End: 2024-06-20

## 2024-06-10 ASSESSMENT — LIFESTYLE VARIABLES
HOW MANY STANDARD DRINKS CONTAINING ALCOHOL DO YOU HAVE ON A TYPICAL DAY: PATIENT DOES NOT DRINK
HOW OFTEN DO YOU HAVE A DRINK CONTAINING ALCOHOL: NEVER

## 2024-06-10 ASSESSMENT — ENCOUNTER SYMPTOMS: ABDOMINAL PAIN: 0

## 2024-06-10 ASSESSMENT — PAIN DESCRIPTION - LOCATION: LOCATION: FLANK

## 2024-06-10 ASSESSMENT — PAIN - FUNCTIONAL ASSESSMENT: PAIN_FUNCTIONAL_ASSESSMENT: 0-10

## 2024-06-10 ASSESSMENT — PAIN SCALES - GENERAL: PAINLEVEL_OUTOF10: 6

## 2024-06-10 NOTE — ED PROVIDER NOTES
prescribed a week ago, with moderate improvement in his symptoms.      On examination he has normal vital signs.  Afebrile.  No tachycardia.    Abdomen soft and nontender.  Minimal flank tenderness.  Benign exam.  He does have a somewhat blunted and slowed affect.    Initially hypoglycemic, improved with food and juice, improved into the 200 range.    The rest of his blood work is stable, with stable CKD, no leukocytosis.    Urinalysis and CT scan of the abdomen pelvis are unremarkable.  No significant acute findings.    No signs of infectious process, kidney stone, retroperitoneal hematoma etc.  I suspect that his flank pain is musculoskeletal in nature.  Stable for discharge home, can safely use Tylenol and Flexeril    Final Diagnosis:   1. Left flank pain    2. Hypoglycemic reaction to insulin in type 2 diabetes mellitus (HCC)        Additional documentation if relevant for this encounter       Diagnosis and Disposition     Disposition: Decision To Discharge 06/10/2024 06:51:11 PM     Final Diagnosis:   1. Left flank pain    2. Hypoglycemic reaction to insulin in type 2 diabetes mellitus (HCC)           Medication List        START taking these medications      cyclobenzaprine 10 MG tablet  Commonly known as: FLEXERIL  Take 1 tablet by mouth 2 times daily as needed for Muscle spasms            CONTINUE taking these medications      ReliOn Pen Needles 32G X 4 MM Misc  Generic drug: Insulin Pen Needle            ASK your doctor about these medications      atorvastatin 20 MG tablet  Commonly known as: LIPITOR     carvedilol 12.5 MG tablet  Commonly known as: COREG     clobetasol 0.05 % cream  Commonly known as: TEMOVATE     cloNIDine 0.1 MG tablet  Commonly known as: CATAPRES     dilTIAZem 120 MG extended release capsule  Commonly known as: TIAZAC     fenofibrate micronized 134 MG capsule  Commonly known as: LOFIBRA     furosemide 40 MG tablet  Commonly known as: LASIX     gabapentin 100 MG capsule  Commonly known

## 2024-06-10 NOTE — DISCHARGE INSTRUCTIONS
It was a pleasure taking care of you at [facility] Emergency Department today.  We know that when you come to [facility], you are entrusting us with your health, comfort, and safety.  Our physicians and nurses honor that trust, and we truly appreciate the opportunity to care for you and your loved ones.      We also value your feedback.  If you receive a survey about your Emergency Department experience today, please fill it out.  We care about our patients' feedback, and we listen to what you have to say.  Thank you!

## 2024-06-10 NOTE — ED NOTES
1541: Patient given orange juice at this time    1600: Patients BS still lower, given more OJ at this time. Provider seeing patient in [place].

## 2024-06-10 NOTE — ED NOTES
1614 This RN provided pt with x3 4oz OJ in which pt drank all of it, rechecked bg = 64; Justine charge RN notified this RN that she provided pt with x2 4oz OJ containers earlier   1616 This RN provided pt with x3 4oz apple juice and a 4oz container of ice cream in which pt drank/ate all of it,will recheck at 15min sia per hospital policy

## 2024-06-10 NOTE — ED NOTES
Pt's IV removed. Pt provided D/C instructions including but not limited to follow-up care, medications, how to access MyChart and verbalizes understanding of D/C teaching. Pt has all belongings. Pt escorted out of ED to personal transport at this time.  Patient is A&Ox4, on RA, and is in NAD at the time of discharge.

## 2024-07-10 ENCOUNTER — APPOINTMENT (OUTPATIENT)
Facility: HOSPITAL | Age: 79
End: 2024-07-10
Payer: MEDICARE

## 2024-07-10 ENCOUNTER — HOSPITAL ENCOUNTER (INPATIENT)
Facility: HOSPITAL | Age: 79
LOS: 2 days | Discharge: HOME | End: 2024-07-12
Attending: EMERGENCY MEDICINE | Admitting: INTERNAL MEDICINE
Payer: MEDICARE

## 2024-07-10 DIAGNOSIS — I50.9 CONGESTIVE HEART FAILURE, UNSPECIFIED HF CHRONICITY, UNSPECIFIED HEART FAILURE TYPE (HCC): ICD-10-CM

## 2024-07-10 DIAGNOSIS — R60.0 BILATERAL LEG EDEMA: ICD-10-CM

## 2024-07-10 DIAGNOSIS — D64.9 ACUTE ANEMIA: Primary | ICD-10-CM

## 2024-07-10 LAB
ALBUMIN SERPL-MCNC: 3 G/DL (ref 3.5–5)
ALBUMIN/GLOB SERPL: 0.8 (ref 1.1–2.2)
ALP SERPL-CCNC: 37 U/L (ref 45–117)
ALT SERPL-CCNC: 23 U/L (ref 12–78)
ANION GAP SERPL CALC-SCNC: 5 MMOL/L (ref 5–15)
AST SERPL-CCNC: 18 U/L (ref 15–37)
BASOPHILS # BLD: 0 K/UL (ref 0–0.1)
BASOPHILS NFR BLD: 0 % (ref 0–1)
BILIRUB SERPL-MCNC: 0.6 MG/DL (ref 0.2–1)
BUN SERPL-MCNC: 46 MG/DL (ref 6–20)
BUN/CREAT SERPL: 16 (ref 12–20)
CALCIUM SERPL-MCNC: 9.9 MG/DL (ref 8.5–10.1)
CHLORIDE SERPL-SCNC: 106 MMOL/L (ref 97–108)
CO2 SERPL-SCNC: 28 MMOL/L (ref 21–32)
COMMENT:: NORMAL
CREAT SERPL-MCNC: 2.84 MG/DL (ref 0.7–1.3)
DIFFERENTIAL METHOD BLD: ABNORMAL
ECHO BSA: 2.27 M2
EOSINOPHIL # BLD: 0.1 K/UL (ref 0–0.4)
EOSINOPHIL NFR BLD: 2 % (ref 0–7)
ERYTHROCYTE [DISTWIDTH] IN BLOOD BY AUTOMATED COUNT: 13.5 % (ref 11.5–14.5)
EST. AVERAGE GLUCOSE BLD GHB EST-MCNC: 160 MG/DL
FERRITIN SERPL-MCNC: 501 NG/ML (ref 26–388)
FOLATE SERPL-MCNC: 8.9 NG/ML (ref 5–21)
GLOBULIN SER CALC-MCNC: 3.9 G/DL (ref 2–4)
GLUCOSE BLD STRIP.AUTO-MCNC: 218 MG/DL (ref 65–117)
GLUCOSE BLD STRIP.AUTO-MCNC: 224 MG/DL (ref 65–117)
GLUCOSE SERPL-MCNC: 137 MG/DL (ref 65–100)
HBA1C MFR BLD: 7.2 % (ref 4–5.6)
HCT VFR BLD AUTO: 22.1 % (ref 36.6–50.3)
HEMOCCULT STL QL: NEGATIVE
HGB BLD-MCNC: 7.2 G/DL (ref 12.1–17)
IMM GRANULOCYTES # BLD AUTO: 0 K/UL (ref 0–0.04)
IMM GRANULOCYTES NFR BLD AUTO: 0 % (ref 0–0.5)
IRON SATN MFR SERPL: 23 % (ref 20–50)
IRON SERPL-MCNC: 68 UG/DL (ref 35–150)
LYMPHOCYTES # BLD: 2.7 K/UL (ref 0.8–3.5)
LYMPHOCYTES NFR BLD: 37 % (ref 12–49)
MCH RBC QN AUTO: 30.6 PG (ref 26–34)
MCHC RBC AUTO-ENTMCNC: 32.6 G/DL (ref 30–36.5)
MCV RBC AUTO: 94 FL (ref 80–99)
MONOCYTES # BLD: 0.9 K/UL (ref 0–1)
MONOCYTES NFR BLD: 12 % (ref 5–13)
NEUTS SEG # BLD: 3.7 K/UL (ref 1.8–8)
NEUTS SEG NFR BLD: 49 % (ref 32–75)
NRBC # BLD: 0 K/UL (ref 0–0.01)
NRBC BLD-RTO: 0 PER 100 WBC
NT PRO BNP: 248 PG/ML
PLATELET # BLD AUTO: 273 K/UL (ref 150–400)
PMV BLD AUTO: 11.5 FL (ref 8.9–12.9)
POTASSIUM SERPL-SCNC: 4.4 MMOL/L (ref 3.5–5.1)
PROT SERPL-MCNC: 6.9 G/DL (ref 6.4–8.2)
RBC # BLD AUTO: 2.35 M/UL (ref 4.1–5.7)
SERVICE CMNT-IMP: ABNORMAL
SERVICE CMNT-IMP: ABNORMAL
SODIUM SERPL-SCNC: 139 MMOL/L (ref 136–145)
SPECIMEN HOLD: NORMAL
TIBC SERPL-MCNC: 290 UG/DL (ref 250–450)
TROPONIN I SERPL HS-MCNC: 11 NG/L (ref 0–76)
VIT B12 SERPL-MCNC: 672 PG/ML (ref 193–986)
WBC # BLD AUTO: 7.5 K/UL (ref 4.1–11.1)

## 2024-07-10 PROCEDURE — 83036 HEMOGLOBIN GLYCOSYLATED A1C: CPT

## 2024-07-10 PROCEDURE — 71045 X-RAY EXAM CHEST 1 VIEW: CPT

## 2024-07-10 PROCEDURE — 36415 COLL VENOUS BLD VENIPUNCTURE: CPT

## 2024-07-10 PROCEDURE — 83540 ASSAY OF IRON: CPT

## 2024-07-10 PROCEDURE — 80053 COMPREHEN METABOLIC PANEL: CPT

## 2024-07-10 PROCEDURE — 1100000000 HC RM PRIVATE

## 2024-07-10 PROCEDURE — 85025 COMPLETE CBC W/AUTO DIFF WBC: CPT

## 2024-07-10 PROCEDURE — 93970 EXTREMITY STUDY: CPT

## 2024-07-10 PROCEDURE — 96374 THER/PROPH/DIAG INJ IV PUSH: CPT

## 2024-07-10 PROCEDURE — 84484 ASSAY OF TROPONIN QUANT: CPT

## 2024-07-10 PROCEDURE — 82607 VITAMIN B-12: CPT

## 2024-07-10 PROCEDURE — 99285 EMERGENCY DEPT VISIT HI MDM: CPT

## 2024-07-10 PROCEDURE — 6360000002 HC RX W HCPCS: Performed by: EMERGENCY MEDICINE

## 2024-07-10 PROCEDURE — 82962 GLUCOSE BLOOD TEST: CPT

## 2024-07-10 PROCEDURE — 83550 IRON BINDING TEST: CPT

## 2024-07-10 PROCEDURE — 83880 ASSAY OF NATRIURETIC PEPTIDE: CPT

## 2024-07-10 PROCEDURE — 2580000003 HC RX 258: Performed by: INTERNAL MEDICINE

## 2024-07-10 PROCEDURE — 82728 ASSAY OF FERRITIN: CPT

## 2024-07-10 PROCEDURE — 82272 OCCULT BLD FECES 1-3 TESTS: CPT

## 2024-07-10 PROCEDURE — 6370000000 HC RX 637 (ALT 250 FOR IP): Performed by: INTERNAL MEDICINE

## 2024-07-10 PROCEDURE — 82746 ASSAY OF FOLIC ACID SERUM: CPT

## 2024-07-10 PROCEDURE — 93005 ELECTROCARDIOGRAM TRACING: CPT | Performed by: EMERGENCY MEDICINE

## 2024-07-10 RX ORDER — ONDANSETRON 2 MG/ML
4 INJECTION INTRAMUSCULAR; INTRAVENOUS EVERY 6 HOURS PRN
Status: DISCONTINUED | OUTPATIENT
Start: 2024-07-10 | End: 2024-07-12 | Stop reason: HOSPADM

## 2024-07-10 RX ORDER — SODIUM CHLORIDE 0.9 % (FLUSH) 0.9 %
5-40 SYRINGE (ML) INJECTION PRN
Status: DISCONTINUED | OUTPATIENT
Start: 2024-07-10 | End: 2024-07-12 | Stop reason: HOSPADM

## 2024-07-10 RX ORDER — GABAPENTIN 100 MG/1
100 CAPSULE ORAL NIGHTLY
Status: DISCONTINUED | OUTPATIENT
Start: 2024-07-10 | End: 2024-07-12 | Stop reason: HOSPADM

## 2024-07-10 RX ORDER — CARVEDILOL 12.5 MG/1
12.5 TABLET ORAL 2 TIMES DAILY
Status: DISCONTINUED | OUTPATIENT
Start: 2024-07-10 | End: 2024-07-12 | Stop reason: HOSPADM

## 2024-07-10 RX ORDER — FUROSEMIDE 10 MG/ML
40 INJECTION INTRAMUSCULAR; INTRAVENOUS DAILY
Status: DISCONTINUED | OUTPATIENT
Start: 2024-07-11 | End: 2024-07-12 | Stop reason: HOSPADM

## 2024-07-10 RX ORDER — POLYETHYLENE GLYCOL 3350 17 G/17G
17 POWDER, FOR SOLUTION ORAL DAILY PRN
Status: DISCONTINUED | OUTPATIENT
Start: 2024-07-10 | End: 2024-07-12 | Stop reason: HOSPADM

## 2024-07-10 RX ORDER — ACETAMINOPHEN 325 MG/1
650 TABLET ORAL EVERY 4 HOURS PRN
Status: DISCONTINUED | OUTPATIENT
Start: 2024-07-10 | End: 2024-07-10 | Stop reason: SDUPTHER

## 2024-07-10 RX ORDER — INSULIN LISPRO 100 [IU]/ML
0-4 INJECTION, SOLUTION INTRAVENOUS; SUBCUTANEOUS NIGHTLY
Status: DISCONTINUED | OUTPATIENT
Start: 2024-07-10 | End: 2024-07-12 | Stop reason: HOSPADM

## 2024-07-10 RX ORDER — HEPARIN SODIUM 5000 [USP'U]/ML
5000 INJECTION, SOLUTION INTRAVENOUS; SUBCUTANEOUS EVERY 8 HOURS SCHEDULED
Status: DISCONTINUED | OUTPATIENT
Start: 2024-07-11 | End: 2024-07-12 | Stop reason: HOSPADM

## 2024-07-10 RX ORDER — ONDANSETRON 4 MG/1
4 TABLET, ORALLY DISINTEGRATING ORAL EVERY 8 HOURS PRN
Status: DISCONTINUED | OUTPATIENT
Start: 2024-07-10 | End: 2024-07-12 | Stop reason: HOSPADM

## 2024-07-10 RX ORDER — INSULIN LISPRO 100 [IU]/ML
20 INJECTION, SOLUTION INTRAVENOUS; SUBCUTANEOUS
Status: DISCONTINUED | OUTPATIENT
Start: 2024-07-10 | End: 2024-07-12 | Stop reason: HOSPADM

## 2024-07-10 RX ORDER — CLONIDINE HYDROCHLORIDE 0.1 MG/1
0.1 TABLET ORAL 2 TIMES DAILY PRN
Status: DISCONTINUED | OUTPATIENT
Start: 2024-07-10 | End: 2024-07-12 | Stop reason: HOSPADM

## 2024-07-10 RX ORDER — FENOFIBRATE 160 MG/1
160 TABLET ORAL DAILY
Status: DISCONTINUED | OUTPATIENT
Start: 2024-07-11 | End: 2024-07-12 | Stop reason: HOSPADM

## 2024-07-10 RX ORDER — FUROSEMIDE 10 MG/ML
40 INJECTION INTRAMUSCULAR; INTRAVENOUS
Status: COMPLETED | OUTPATIENT
Start: 2024-07-10 | End: 2024-07-10

## 2024-07-10 RX ORDER — INSULIN LISPRO 100 [IU]/ML
0-16 INJECTION, SOLUTION INTRAVENOUS; SUBCUTANEOUS
Status: DISCONTINUED | OUTPATIENT
Start: 2024-07-10 | End: 2024-07-12 | Stop reason: HOSPADM

## 2024-07-10 RX ORDER — LISINOPRIL 5 MG/1
10 TABLET ORAL DAILY
Status: DISCONTINUED | OUTPATIENT
Start: 2024-07-11 | End: 2024-07-12 | Stop reason: HOSPADM

## 2024-07-10 RX ORDER — INSULIN GLARGINE 100 [IU]/ML
80 INJECTION, SOLUTION SUBCUTANEOUS NIGHTLY
Status: DISCONTINUED | OUTPATIENT
Start: 2024-07-10 | End: 2024-07-12 | Stop reason: HOSPADM

## 2024-07-10 RX ORDER — ACETAMINOPHEN 650 MG/1
650 SUPPOSITORY RECTAL EVERY 6 HOURS PRN
Status: DISCONTINUED | OUTPATIENT
Start: 2024-07-10 | End: 2024-07-12 | Stop reason: HOSPADM

## 2024-07-10 RX ORDER — SODIUM CHLORIDE 9 MG/ML
INJECTION, SOLUTION INTRAVENOUS PRN
Status: DISCONTINUED | OUTPATIENT
Start: 2024-07-10 | End: 2024-07-12 | Stop reason: HOSPADM

## 2024-07-10 RX ORDER — ATORVASTATIN CALCIUM 20 MG/1
20 TABLET, FILM COATED ORAL NIGHTLY
Status: DISCONTINUED | OUTPATIENT
Start: 2024-07-10 | End: 2024-07-12 | Stop reason: HOSPADM

## 2024-07-10 RX ORDER — DEXTROSE MONOHYDRATE 100 MG/ML
INJECTION, SOLUTION INTRAVENOUS CONTINUOUS PRN
Status: DISCONTINUED | OUTPATIENT
Start: 2024-07-10 | End: 2024-07-12 | Stop reason: HOSPADM

## 2024-07-10 RX ORDER — GLUCAGON 1 MG/ML
1 KIT INJECTION PRN
Status: DISCONTINUED | OUTPATIENT
Start: 2024-07-10 | End: 2024-07-12 | Stop reason: HOSPADM

## 2024-07-10 RX ORDER — ACETAMINOPHEN 325 MG/1
650 TABLET ORAL EVERY 6 HOURS PRN
Status: DISCONTINUED | OUTPATIENT
Start: 2024-07-10 | End: 2024-07-12 | Stop reason: HOSPADM

## 2024-07-10 RX ORDER — CLOBETASOL PROPIONATE 0.5 MG/G
CREAM TOPICAL 2 TIMES DAILY
Status: DISCONTINUED | OUTPATIENT
Start: 2024-07-10 | End: 2024-07-12 | Stop reason: HOSPADM

## 2024-07-10 RX ORDER — VITAMIN B COMPLEX
1000 TABLET ORAL DAILY
Status: DISCONTINUED | OUTPATIENT
Start: 2024-07-11 | End: 2024-07-12 | Stop reason: HOSPADM

## 2024-07-10 RX ORDER — SODIUM CHLORIDE 0.9 % (FLUSH) 0.9 %
5-40 SYRINGE (ML) INJECTION EVERY 12 HOURS SCHEDULED
Status: DISCONTINUED | OUTPATIENT
Start: 2024-07-10 | End: 2024-07-12 | Stop reason: HOSPADM

## 2024-07-10 RX ADMIN — CARVEDILOL 12.5 MG: 12.5 TABLET, FILM COATED ORAL at 21:05

## 2024-07-10 RX ADMIN — INSULIN LISPRO 20 UNITS: 100 INJECTION, SOLUTION INTRAVENOUS; SUBCUTANEOUS at 17:36

## 2024-07-10 RX ADMIN — INSULIN LISPRO 4 UNITS: 100 INJECTION, SOLUTION INTRAVENOUS; SUBCUTANEOUS at 17:36

## 2024-07-10 RX ADMIN — INSULIN GLARGINE 80 UNITS: 100 INJECTION, SOLUTION SUBCUTANEOUS at 21:04

## 2024-07-10 RX ADMIN — SODIUM CHLORIDE, PRESERVATIVE FREE 10 ML: 5 INJECTION INTRAVENOUS at 21:16

## 2024-07-10 RX ADMIN — CLONIDINE HYDROCHLORIDE 0.1 MG: 0.1 TABLET ORAL at 17:37

## 2024-07-10 RX ADMIN — ATORVASTATIN CALCIUM 20 MG: 20 TABLET, FILM COATED ORAL at 21:05

## 2024-07-10 RX ADMIN — FUROSEMIDE 40 MG: 10 INJECTION, SOLUTION INTRAMUSCULAR; INTRAVENOUS at 14:09

## 2024-07-10 RX ADMIN — GABAPENTIN 100 MG: 100 CAPSULE ORAL at 21:05

## 2024-07-10 ASSESSMENT — LIFESTYLE VARIABLES
HOW OFTEN DO YOU HAVE A DRINK CONTAINING ALCOHOL: NEVER
HOW MANY STANDARD DRINKS CONTAINING ALCOHOL DO YOU HAVE ON A TYPICAL DAY: PATIENT DOES NOT DRINK

## 2024-07-10 ASSESSMENT — PAIN SCALES - GENERAL: PAINLEVEL_OUTOF10: 10

## 2024-07-10 ASSESSMENT — PAIN - FUNCTIONAL ASSESSMENT: PAIN_FUNCTIONAL_ASSESSMENT: 0-10

## 2024-07-10 NOTE — H&P
Hospitalist Admission Note    NAME:   Sabas Diaz   : 1945   MRN: [id number]     Date/Time: 7/10/2024 4:58 PM    Patient PCP: Vera Zaragoza MD    ______________________________________________________________________  Given the patient's current clinical presentation, I have a high level of concern for decompensation if discharged from the emergency department.  Complex decision making was performed, which includes reviewing the patient's available past medical records, laboratory results, and x-ray films.       My assessment of this patient's clinical condition and my plan of care is as follows.    Assessment / Plan:  B/L lower extremity swelling  Rule out congestive heart failure  Start lasix 40 mg iv daily  Intake/output monitoring  Will follow ECHO    Anemia:  No clear s/s of bleeding  ?hemodilution due to fluid overload vs anemia of chronic disease  Will send anemia studies    Insulin dependent diabetes mellitus  Start lantus 80 units sc HS(home dose 110)  Start lispro 20 units sc pre meal (on )   Start lispro sliding scale      Hypertension:  Continue coreg 12.5 mg po BID  Continue lisinopril 10 mg po daily  Change clonidine 0.1 mg po bid prn      Hx of prostate cancer  HOWARD                      Medical Decision Making:   I personally reviewed labs: cnc Hb dropped from 9.2 to 7.2 in 1month- no s/s of bleeding . ? Hemodilution due to fluid overload  BMP: elevated creatinine 2.84 around his baseline  Troponin 11  Pro  not significantly elevated- patient is obese    I personally reviewed imaging:CXR no acute process  I personally reviewed EKG: interpreted by me- normal sinus rhythm, no acute ST-T wave changes  Toxic drug monitoring: creatinine and electrolytes while patient is getting iv diuresis.    Discussed case with: ED provider. After discussion I am in agreement that acuity of patient's medical condition necessitates hospital stay.      Code Status: full  DVT Prophylaxis: heparin    EKG 12 Lead    Collection Time: 07/10/24 11:59 AM   Result Value Ref Range    Ventricular Rate 78 BPM    Atrial Rate 78 BPM    P-R Interval 150 ms    QRS Duration 80 ms    Q-T Interval 338 ms    QTc Calculation (Bazett) 385 ms    P Axis 64 degrees    R Axis 17 degrees    T Axis 33 degrees    Diagnosis       Normal sinus rhythm  Normal ECG  No previous ECGs available     CBC with Auto Differential    Collection Time: 07/10/24 12:04 PM   Result Value Ref Range    WBC 7.5 4.1 - 11.1 K/uL    RBC 2.35 (L) 4.10 - 5.70 M/uL    Hemoglobin 7.2 (L) 12.1 - 17.0 g/dL    Hematocrit 22.1 (L) 36.6 - 50.3 %    MCV 94.0 80.0 - 99.0 FL    MCH 30.6 26.0 - 34.0 PG    MCHC 32.6 30.0 - 36.5 g/dL    RDW 13.5 11.5 - 14.5 %    Platelets 273 150 - 400 K/uL    MPV 11.5 8.9 - 12.9 FL    Nucleated RBCs 0.0 0  WBC    nRBC 0.00 0.00 - 0.01 K/uL    Neutrophils % 49 32 - 75 %    Lymphocytes % 37 12 - 49 %    Monocytes % 12 5 - 13 %    Eosinophils % 2 0 - 7 %    Basophils % 0 0 - 1 %    Immature Granulocytes % 0 0.0 - 0.5 %    Neutrophils Absolute 3.7 1.8 - 8.0 K/UL    Lymphocytes Absolute 2.7 0.8 - 3.5 K/UL    Monocytes Absolute 0.9 0.0 - 1.0 K/UL    Eosinophils Absolute 0.1 0.0 - 0.4 K/UL    Basophils Absolute 0.0 0.0 - 0.1 K/UL    Immature Granulocytes Absolute 0.0 0.00 - 0.04 K/UL    Differential Type AUTOMATED     Comprehensive Metabolic Panel    Collection Time: 07/10/24 12:04 PM   Result Value Ref Range    Sodium 139 136 - 145 mmol/L    Potassium 4.4 3.5 - 5.1 mmol/L    Chloride 106 97 - 108 mmol/L    CO2 28 21 - 32 mmol/L    Anion Gap 5 5 - 15 mmol/L    Glucose 137 (H) 65 - 100 mg/dL    BUN 46 (H) 6 - 20 MG/DL    Creatinine 2.84 (H) 0.70 - 1.30 MG/DL    BUN/Creatinine Ratio 16 12 - 20      Est, Glom Filt Rate 22 (L) >60 ml/min/1.73m2    Calcium 9.9 8.5 - 10.1 MG/DL    Total Bilirubin 0.6 0.2 - 1.0 MG/DL    ALT 23 12 - 78 U/L    AST 18 15 - 37 U/L    Alk Phosphatase 37 (L) 45 - 117 U/L    Total Protein 6.9 6.4 - 8.2 g/dL    Albumin  3.0 (L) 3.5 - 5.0 g/dL    Globulin 3.9 2.0 - 4.0 g/dL    Albumin/Globulin Ratio 0.8 (L) 1.1 - 2.2     Brain Natriuretic Peptide    Collection Time: 07/10/24 12:04 PM   Result Value Ref Range    NT Pro- <450 PG/ML   Extra Tubes Hold    Collection Time: 07/10/24 12:04 PM   Result Value Ref Range    Specimen HOld BLUE, 2 PST, LAV, RED     Comment:        Add-on orders for these samples will be processed based on acceptable specimen integrity and analyte stability, which may vary by analyte.   Troponin    Collection Time: 07/10/24 12:48 PM   Result Value Ref Range    Troponin, High Sensitivity 11 0 - 76 ng/L   Vascular duplex lower extremity venous bilateral    Collection Time: 07/10/24  1:10 PM   Result Value Ref Range    Body Surface Area 2.27 m2   Occult Blood, Fecal    Collection Time: 07/10/24  2:48 PM   Result Value Ref Range    POC Occult Blood, Fecal Negative NEG           Vascular duplex lower extremity venous bilateral    Result Date: 7/10/2024    No evidence of deep vein or superficial vein thrombosis in the right lower extremity. Vessels demonstrate normal compressibility, color filling, and phasic and spontaneous flow.   No evidence of deep vein or superficial vein thrombosis in the left lower extremity. Vessels demonstrate normal compressibility, color filling, and phasic and spontaneous flow.     XR CHEST PORTABLE    Result Date: 7/10/2024  EXAM:  XR CHEST PORTABLE INDICATION: Volume overloaded COMPARISON: 3/19/2024 TECHNIQUE: Portable AP upright chest view at 1235 hours FINDINGS: The cardiomediastinal contours are stable. The pulmonary vasculature is within normal limits. The lungs and pleural spaces are clear. There is no pneumothorax. The bones and upper abdomen are stable.     No acute process. Electronically signed by Derrek Hood     _______________________________________________________________________    TOTAL TIME:  76 Minutes    Critical Care Provided     Minutes non procedure

## 2024-07-10 NOTE — PROGRESS NOTES
Admission Medication History Technician Note:    Hemodialysis patient: None    Patient preferred pharmacy Confirmed:    [facility] [address] [place] [address] - [address] - P [phone] - F [phone]  [address]  [facility] [address]  Phone: [phone] Fax: [phone]      Information obtained from¹: Patient and Rx Query    Does patient manage their medications: yes    Comments/Recommendations: Updated PTA meds/reviewed patient's allergies.    1)  Medication issues identified: none    2)  Medication changes (since last review):  Added  + vit D      Removed  - diltiazem        Adjusted      3)  Pertinent Pharmacy Findings:  Identified High Alert Medication Information  None     ¹RxQuery pharmacy benefit data reflects medications filled and processed through the patient's insurance, however this data does NOT capture whether the medication was picked up or is currently being taken by the patient.    Allergies:  Patient has no known allergies.    Chief Complaint for this Admission:    Chief Complaint   Patient presents with    Leg Swelling     Patient with leg swelling over the past week.  Patient denies SOB.      Prior to Admission Medications:   Current Outpatient Medications   Medication Instructions    atorvastatin (LIPITOR) 20 MG tablet TAKE 1 TABLET BY MOUTH ONCE DAILY AS DIRECTED AT BEDTIME FOR 90 DAYS    carvedilol (COREG) 12.5 MG tablet Take 1 tablet by mouth 2 times daily    clobetasol (TEMOVATE) 0.05 % cream clobetasol 0.05 % topical cream   APPLY ONCE DAILY TO LOWER LEGS AS NEEDED    cloNIDine (CATAPRES) 0.1 mg, Oral, 2 TIMES DAILY    fenofibrate micronized (LOFIBRA) 134 MG capsule TAKE 1 CAPSULE BY MOUTH ONCE DAILY WITH A MEAL    furosemide (LASIX) 40 mg, Oral, DAILY    gabapentin (NEURONTIN) 100 MG capsule TAKE 1 CAPSULE BY MOUTH ONCE DAILY AT BEDTIME    insulin aspart (NOVOLOG FLEXPEN) 100 UNIT/ML injection pen INJECT 36 UNITS SUBCUTANEOUSLY WITH BREAKFAST AND 28 UNITS WITH LUNCH

## 2024-07-10 NOTE — ED PROVIDER NOTES
[facility] EMERGENCY DEPT  EMERGENCY DEPARTMENT ENCOUNTER       Pt Name: Sabas Diaz  MRN: [id number]  Birthdate 1945  Date of evaluation: 7/10/2024  Provider: Reinaldo Jimenez DO   PCP: Vera Zaragoza MD  Note Started: 5:48 PM EDT 7/10/24     CHIEF COMPLAINT       Chief Complaint   Patient presents with    Leg Swelling     Patient with leg swelling over the past week.  Patient denies SOB.         HISTORY OF PRESENT ILLNESS: 1 or more elements      History From: Patient, History limited by: .  none     Sabas Diaz is a 79 y.o. male Presents to the emergency department for increased leg swelling over the past week in addition to shortness of breath especially with exertion       Please See MDM for Additional Details of the HPI/PMH  Nursing Notes were all reviewed and agreed with or any disagreements were addressed in the HPI.     REVIEW OF SYSTEMS        Positives and Pertinent negatives as per HPI.    PAST HISTORY     Past Medical History:  Past Medical History:   Diagnosis Date    Diabetes (HCC)     Hypertension     Prostate CA (HCC)     Sleep apnea        Past Surgical History:  Past Surgical History:   Procedure Laterality Date    VEIN SURGERY         Family History:  No family history on file.    Social History:  Social History     Tobacco Use    Smoking status: Unknown       Allergies:  No Known Allergies    CURRENT MEDICATIONS      Previous Medications    ATORVASTATIN (LIPITOR) 20 MG TABLET    TAKE 1 TABLET BY MOUTH ONCE DAILY AS DIRECTED AT BEDTIME FOR 90 DAYS    CARVEDILOL (COREG) 12.5 MG TABLET    Take 1 tablet by mouth 2 times daily    CLOBETASOL (TEMOVATE) 0.05 % CREAM    clobetasol 0.05 % topical cream   APPLY ONCE DAILY TO LOWER LEGS AS NEEDED    CLONIDINE (CATAPRES) 0.1 MG TABLET    Take 1 tablet by mouth 2 times daily    FENOFIBRATE MICRONIZED (LOFIBRA) 134 MG CAPSULE    TAKE 1 CAPSULE BY MOUTH ONCE DAILY WITH A MEAL    FUROSEMIDE (LASIX) 40 MG TABLET    Take 1 tablet by mouth daily    GABAPENTIN

## 2024-07-11 ENCOUNTER — APPOINTMENT (OUTPATIENT)
Facility: HOSPITAL | Age: 79
End: 2024-07-11
Attending: INTERNAL MEDICINE
Payer: MEDICARE

## 2024-07-11 LAB
ANION GAP SERPL CALC-SCNC: 6 MMOL/L (ref 5–15)
BASOPHILS # BLD: 0 K/UL (ref 0–0.1)
BASOPHILS NFR BLD: 0 % (ref 0–1)
BUN SERPL-MCNC: 48 MG/DL (ref 6–20)
BUN/CREAT SERPL: 18 (ref 12–20)
CALCIUM SERPL-MCNC: 9.7 MG/DL (ref 8.5–10.1)
CHLORIDE SERPL-SCNC: 104 MMOL/L (ref 97–108)
CO2 SERPL-SCNC: 28 MMOL/L (ref 21–32)
CREAT SERPL-MCNC: 2.61 MG/DL (ref 0.7–1.3)
DIFFERENTIAL METHOD BLD: ABNORMAL
ECHO AO ROOT DIAM: 3 CM
ECHO AO ROOT INDEX: 1.35 CM/M2
ECHO AV AREA PEAK VELOCITY: 2.4 CM2
ECHO AV AREA VTI: 2.4 CM2
ECHO AV AREA/BSA VTI: 1.1 CM2/M2
ECHO AV MEAN GRADIENT: 6 MMHG
ECHO AV MEAN VELOCITY: 1.2 M/S
ECHO AV PEAK GRADIENT: 10 MMHG
ECHO AV PEAK GRADIENT: 10 MMHG
ECHO AV PEAK VELOCITY: 1.6 M/S
ECHO AV PEAK VELOCITY: 1.6 M/S
ECHO AV VTI: 32.7 CM
ECHO BSA: 2.27 M2
ECHO LV EDV A2C: 113 ML
ECHO LV EDV A4C: 144 ML
ECHO LV EDV BP: 128 ML (ref 67–155)
ECHO LV EDV INDEX A4C: 65 ML/M2
ECHO LV EDV INDEX BP: 58 ML/M2
ECHO LV EDV NDEX A2C: 51 ML/M2
ECHO LV EJECTION FRACTION A2C: 53 %
ECHO LV EJECTION FRACTION A4C: 64 %
ECHO LV EJECTION FRACTION BIPLANE: 59 % (ref 55–100)
ECHO LV ESV A2C: 53 ML
ECHO LV ESV A4C: 52 ML
ECHO LV ESV BP: 53 ML (ref 22–58)
ECHO LV ESV INDEX A2C: 24 ML/M2
ECHO LV ESV INDEX A4C: 23 ML/M2
ECHO LV ESV INDEX BP: 24 ML/M2
ECHO LVOT AREA: 3.5 CM2
ECHO LVOT AV VTI INDEX: 0.68
ECHO LVOT DIAM: 2.1 CM
ECHO LVOT MEAN GRADIENT: 3 MMHG
ECHO LVOT PEAK GRADIENT: 5 MMHG
ECHO LVOT PEAK GRADIENT: 5 MMHG
ECHO LVOT PEAK VELOCITY: 1.1 M/S
ECHO LVOT PEAK VELOCITY: 1.1 M/S
ECHO LVOT STROKE VOLUME INDEX: 34.8 ML/M2
ECHO LVOT SV: 77.2 ML
ECHO LVOT VTI: 22.3 CM
ECHO MV AREA VTI: 3 CM2
ECHO MV LVOT VTI INDEX: 1.14
ECHO MV MAX VELOCITY: 1 M/S
ECHO MV MEAN GRADIENT: 1 MMHG
ECHO MV MEAN VELOCITY: 0.5 M/S
ECHO MV PEAK GRADIENT: 4 MMHG
ECHO MV VTI: 25.5 CM
ECHO TV REGURGITANT MAX VELOCITY: 2.93 M/S
ECHO TV REGURGITANT PEAK GRADIENT: 34 MMHG
EKG ATRIAL RATE: 78 BPM
EKG DIAGNOSIS: NORMAL
EKG P AXIS: 64 DEGREES
EKG P-R INTERVAL: 150 MS
EKG Q-T INTERVAL: 338 MS
EKG QRS DURATION: 80 MS
EKG QTC CALCULATION (BAZETT): 385 MS
EKG R AXIS: 17 DEGREES
EKG T AXIS: 33 DEGREES
EKG VENTRICULAR RATE: 78 BPM
EOSINOPHIL # BLD: 0.1 K/UL (ref 0–0.4)
EOSINOPHIL NFR BLD: 2 % (ref 0–7)
ERYTHROCYTE [DISTWIDTH] IN BLOOD BY AUTOMATED COUNT: 13.3 % (ref 11.5–14.5)
GLUCOSE BLD STRIP.AUTO-MCNC: 164 MG/DL (ref 65–117)
GLUCOSE BLD STRIP.AUTO-MCNC: 189 MG/DL (ref 65–117)
GLUCOSE BLD STRIP.AUTO-MCNC: 228 MG/DL (ref 65–117)
GLUCOSE BLD STRIP.AUTO-MCNC: 322 MG/DL (ref 65–117)
GLUCOSE SERPL-MCNC: 166 MG/DL (ref 65–100)
HCT VFR BLD AUTO: 21.4 % (ref 36.6–50.3)
HCT VFR BLD AUTO: 26.3 % (ref 36.6–50.3)
HGB BLD-MCNC: 6.7 G/DL (ref 12.1–17)
HGB BLD-MCNC: 8.4 G/DL (ref 12.1–17)
HISTORY CHECK: NORMAL
IMM GRANULOCYTES # BLD AUTO: 0 K/UL (ref 0–0.04)
IMM GRANULOCYTES NFR BLD AUTO: 0 % (ref 0–0.5)
LYMPHOCYTES # BLD: 2.6 K/UL (ref 0.8–3.5)
LYMPHOCYTES NFR BLD: 39 % (ref 12–49)
MCH RBC QN AUTO: 29.3 PG (ref 26–34)
MCHC RBC AUTO-ENTMCNC: 31.3 G/DL (ref 30–36.5)
MCV RBC AUTO: 93.4 FL (ref 80–99)
MONOCYTES # BLD: 0.8 K/UL (ref 0–1)
MONOCYTES NFR BLD: 12 % (ref 5–13)
NEUTS SEG # BLD: 3.2 K/UL (ref 1.8–8)
NEUTS SEG NFR BLD: 47 % (ref 32–75)
NRBC # BLD: 0 K/UL (ref 0–0.01)
NRBC BLD-RTO: 0 PER 100 WBC
PLATELET # BLD AUTO: 237 K/UL (ref 150–400)
PMV BLD AUTO: 11.1 FL (ref 8.9–12.9)
POTASSIUM SERPL-SCNC: 4.1 MMOL/L (ref 3.5–5.1)
RBC # BLD AUTO: 2.29 M/UL (ref 4.1–5.7)
RBC MORPH BLD: ABNORMAL
SERVICE CMNT-IMP: ABNORMAL
SODIUM SERPL-SCNC: 138 MMOL/L (ref 136–145)
WBC # BLD AUTO: 6.7 K/UL (ref 4.1–11.1)

## 2024-07-11 PROCEDURE — 86901 BLOOD TYPING SEROLOGIC RH(D): CPT

## 2024-07-11 PROCEDURE — 93321 DOPPLER ECHO F-UP/LMTD STD: CPT

## 2024-07-11 PROCEDURE — 85014 HEMATOCRIT: CPT

## 2024-07-11 PROCEDURE — 30233N1 TRANSFUSION OF NONAUTOLOGOUS RED BLOOD CELLS INTO PERIPHERAL VEIN, PERCUTANEOUS APPROACH: ICD-10-PCS | Performed by: STUDENT IN AN ORGANIZED HEALTH CARE EDUCATION/TRAINING PROGRAM

## 2024-07-11 PROCEDURE — 80048 BASIC METABOLIC PNL TOTAL CA: CPT

## 2024-07-11 PROCEDURE — P9016 RBC LEUKOCYTES REDUCED: HCPCS

## 2024-07-11 PROCEDURE — 2580000003 HC RX 258: Performed by: INTERNAL MEDICINE

## 2024-07-11 PROCEDURE — 86900 BLOOD TYPING SEROLOGIC ABO: CPT

## 2024-07-11 PROCEDURE — 36415 COLL VENOUS BLD VENIPUNCTURE: CPT

## 2024-07-11 PROCEDURE — 82962 GLUCOSE BLOOD TEST: CPT

## 2024-07-11 PROCEDURE — 86850 RBC ANTIBODY SCREEN: CPT

## 2024-07-11 PROCEDURE — 85025 COMPLETE CBC W/AUTO DIFF WBC: CPT

## 2024-07-11 PROCEDURE — 6360000002 HC RX W HCPCS: Performed by: INTERNAL MEDICINE

## 2024-07-11 PROCEDURE — 85018 HEMOGLOBIN: CPT

## 2024-07-11 PROCEDURE — 6370000000 HC RX 637 (ALT 250 FOR IP): Performed by: INTERNAL MEDICINE

## 2024-07-11 PROCEDURE — 86923 COMPATIBILITY TEST ELECTRIC: CPT

## 2024-07-11 PROCEDURE — 1100000000 HC RM PRIVATE

## 2024-07-11 PROCEDURE — 36430 TRANSFUSION BLD/BLD COMPNT: CPT

## 2024-07-11 RX ORDER — SODIUM CHLORIDE 9 MG/ML
INJECTION, SOLUTION INTRAVENOUS PRN
Status: DISCONTINUED | OUTPATIENT
Start: 2024-07-11 | End: 2024-07-12 | Stop reason: HOSPADM

## 2024-07-11 RX ADMIN — FUROSEMIDE 40 MG: 10 INJECTION, SOLUTION INTRAMUSCULAR; INTRAVENOUS at 08:19

## 2024-07-11 RX ADMIN — CARVEDILOL 12.5 MG: 12.5 TABLET, FILM COATED ORAL at 08:20

## 2024-07-11 RX ADMIN — CARVEDILOL 12.5 MG: 12.5 TABLET, FILM COATED ORAL at 21:54

## 2024-07-11 RX ADMIN — CLOBETASOL PROPIONATE CREAM USP, 0.05%: 0.5 CREAM TOPICAL at 08:23

## 2024-07-11 RX ADMIN — LISINOPRIL 10 MG: 5 TABLET ORAL at 08:20

## 2024-07-11 RX ADMIN — SODIUM CHLORIDE, PRESERVATIVE FREE 10 ML: 5 INJECTION INTRAVENOUS at 08:20

## 2024-07-11 RX ADMIN — INSULIN GLARGINE 80 UNITS: 100 INJECTION, SOLUTION SUBCUTANEOUS at 21:54

## 2024-07-11 RX ADMIN — INSULIN LISPRO 12 UNITS: 100 INJECTION, SOLUTION INTRAVENOUS; SUBCUTANEOUS at 16:52

## 2024-07-11 RX ADMIN — GABAPENTIN 100 MG: 100 CAPSULE ORAL at 21:54

## 2024-07-11 RX ADMIN — ATORVASTATIN CALCIUM 20 MG: 20 TABLET, FILM COATED ORAL at 21:54

## 2024-07-11 RX ADMIN — FENOFIBRATE 160 MG: 160 TABLET ORAL at 08:20

## 2024-07-11 RX ADMIN — Medication 1000 UNITS: at 08:20

## 2024-07-11 RX ADMIN — CLOBETASOL PROPIONATE CREAM USP, 0.05%: 0.5 CREAM TOPICAL at 21:58

## 2024-07-11 NOTE — PROGRESS NOTES
Hospitalist Progress Note    NAME:   Sabas Diaz   : 1945   MRN: [id number]     Date/Time: 2024 10:27 AM  Patient PCP: Vera Zaragoza MD    Estimated discharge date:  Barriers:       Assessment / Plan:  B/L lower extremity swelling  Rule out congestive heart failure  Start lasix 40 mg iv daily  Intake/output monitoring  Will follow ECHO     Anemia:  No clear s/s of bleeding  Hemoglobin this morning 6.7  Will transfuse 1 unit of blood  Patient denied dark stool  No history of blood transfusion in the past  Check H&H after transfusion  Check fecal occult test       Insulin dependent diabetes mellitus  Start lantus 80 units sc HS(home dose 110)  Start lispro 20 units sc pre meal (on )   Start lispro sliding scale        Hypertension:  Continue coreg 12.5 mg po BID  Continue lisinopril 10 mg po daily  Change clonidine 0.1 mg po bid prn        Hx of prostate cancer  HOWARD      Medical Decision Making:   I personally reviewed labs:yes  I personally reviewed imaging:yes  I personally reviewed EKG:yes  Toxic drug monitoring: yes  Discussed case with: pt, patient wife, CM, RN        Code Status: full code  DVT Prophylaxis: Heparin held today for low hemoglobin  GI Prophylaxis:    Subjective:     Chief Complaint / Reason for Physician Visit  \"Admitted for anemia workup  Needed blood transfusion today  Patient denied any history of blood transfusion blood in stool, blood in the urine  \".  Discussed with RN events overnight.       Objective:     VITALS:   Last 24hrs VS reviewed since prior progress note. Most recent are:  Patient Vitals for the past 24 hrs:   BP Temp Temp src Pulse Resp SpO2 Height Weight   24 0942 (!) 154/64 -- -- 76 -- -- 1.778 m (5' 10\") 104.3 kg (230 lb)   24 0819 (!) 154/64 -- -- -- -- -- -- --   24 0817 -- 98.1 °F (36.7 °C) Oral 76 18 98 % -- --   24 0316 (!) 124/39 97.7 °F (36.5 °C) Oral 62 18 95 % -- --   07/10/24 1839 (!) 168/59 97.7 °F (36.5 °C) Oral 75  17 97 % -- --   07/10/24 1737 (!) 183/58 -- -- -- -- -- -- --   07/10/24 1725 (!) 199/53 -- -- 69 19 94 % -- --   07/10/24 1715 (!) 187/59 -- -- 73 20 95 % -- --   07/10/24 1700 (!) 182/58 -- -- 76 18 96 % -- --   07/10/24 1415 (!) 139/56 -- -- 66 22 96 % -- --   07/10/24 1409 (!) 144/51 -- -- -- -- -- -- --   07/10/24 1147 (!) 171/43 99.3 °F (37.4 °C) Oral 81 16 96 % 1.778 m (5' 10\") 104.3 kg (230 lb)         Intake/Output Summary (Last 24 hours) at 7/11/2024 1027  Last data filed at 7/11/2024 0956  Gross per 24 hour   Intake 920 ml   Output 2050 ml   Net -1130 ml        I had a face to face encounter and independently examined this patient on 7/11/2024, as outlined below:  PHYSICAL EXAM:  General: Alert, cooperative  EENT:  EOMI. Anicteric sclerae.  Resp:  CTA bilaterally, no wheezing or rales.  No accessory muscle use  CV:  Regular  rhythm,  No edema  GI:  Soft, Non distended, Non tender.  +Bowel sounds  Neurologic:  Alert and oriented X 3, normal speech,   Psych:   Good insight. Not anxious nor agitated  Skin:  No rashes.  No jaundice    Reviewed most current lab test results and cultures  YES  Reviewed most current radiology test results   YES  Review and summation of old records today    NO  Reviewed patient's current orders and MAR    YES  PMH/SH reviewed - no change compared to H&P    Procedures: see electronic medical records for all procedures/Xrays and details which were not copied into this note but were reviewed prior to creation of Plan.      LABS:  I reviewed today's most current labs and imaging studies.  Pertinent labs include:  Recent Labs     07/10/24  1204 07/11/24  0540   WBC 7.5 6.7   HGB 7.2* 6.7*   HCT 22.1* 21.4*    237     Recent Labs     07/10/24  1204 07/11/24  0540    138   K 4.4 4.1    104   CO2 28 28   GLUCOSE 137* 166*   BUN 46* 48*   CREATININE 2.84* 2.61*   CALCIUM 9.9 9.7   BILITOT 0.6  --    AST 18  --    ALT 23  --        Signed: Abi Caceres MD

## 2024-07-11 NOTE — CONSENT
Informed Consent for Blood Component Transfusion Note    I have discussed with the patient the rationale for blood component transfusion; its benefits in treating or preventing fatigue, organ damage, or death; and its risk which includes mild transfusion reactions, rare risk of blood borne infection, or more serious but rare reactions. I have discussed the alternatives to transfusion, including the risk and consequences of not receiving transfusion. The patient had an opportunity to ask questions and had agreed to proceed with transfusion of blood components.    Electronically signed by Abi Caceres MD on 7/11/24 at 9:12 AM EDT

## 2024-07-12 VITALS
RESPIRATION RATE: 20 BRPM | OXYGEN SATURATION: 96 % | WEIGHT: 230 LBS | TEMPERATURE: 98.1 F | SYSTOLIC BLOOD PRESSURE: 140 MMHG | HEIGHT: 70 IN | BODY MASS INDEX: 32.93 KG/M2 | HEART RATE: 67 BPM | DIASTOLIC BLOOD PRESSURE: 54 MMHG

## 2024-07-12 LAB
ABO + RH BLD: NORMAL
ANION GAP SERPL CALC-SCNC: 6 MMOL/L (ref 5–15)
BLD PROD TYP BPU: NORMAL
BLOOD BANK BLOOD PRODUCT EXPIRATION DATE: NORMAL
BLOOD BANK DISPENSE STATUS: NORMAL
BLOOD BANK ISBT PRODUCT BLOOD TYPE: 5100
BLOOD BANK UNIT TYPE AND RH: NORMAL
BLOOD GROUP ANTIBODIES SERPL: NORMAL
BPU ID: NORMAL
BUN SERPL-MCNC: 47 MG/DL (ref 6–20)
BUN/CREAT SERPL: 18 (ref 12–20)
CALCIUM SERPL-MCNC: 9.8 MG/DL (ref 8.5–10.1)
CHLORIDE SERPL-SCNC: 104 MMOL/L (ref 97–108)
CO2 SERPL-SCNC: 27 MMOL/L (ref 21–32)
CREAT SERPL-MCNC: 2.64 MG/DL (ref 0.7–1.3)
CROSSMATCH RESULT: NORMAL
ERYTHROCYTE [DISTWIDTH] IN BLOOD BY AUTOMATED COUNT: 13.8 % (ref 11.5–14.5)
GLUCOSE BLD STRIP.AUTO-MCNC: 139 MG/DL (ref 65–117)
GLUCOSE BLD STRIP.AUTO-MCNC: 177 MG/DL (ref 65–117)
GLUCOSE SERPL-MCNC: 156 MG/DL (ref 65–100)
HCT VFR BLD AUTO: 26.5 % (ref 36.6–50.3)
HEMOCCULT STL QL: NEGATIVE
HGB BLD-MCNC: 8.5 G/DL (ref 12.1–17)
MCH RBC QN AUTO: 29 PG (ref 26–34)
MCHC RBC AUTO-ENTMCNC: 32.1 G/DL (ref 30–36.5)
MCV RBC AUTO: 90.4 FL (ref 80–99)
NRBC # BLD: 0 K/UL (ref 0–0.01)
NRBC BLD-RTO: 0 PER 100 WBC
PLATELET # BLD AUTO: 268 K/UL (ref 150–400)
PMV BLD AUTO: 10.7 FL (ref 8.9–12.9)
POTASSIUM SERPL-SCNC: 4.4 MMOL/L (ref 3.5–5.1)
RBC # BLD AUTO: 2.93 M/UL (ref 4.1–5.7)
SERVICE CMNT-IMP: ABNORMAL
SERVICE CMNT-IMP: ABNORMAL
SODIUM SERPL-SCNC: 137 MMOL/L (ref 136–145)
SPECIMEN EXP DATE BLD: NORMAL
UNIT DIVISION: 0
UNIT ISSUE DATE/TIME: NORMAL
WBC # BLD AUTO: 7.8 K/UL (ref 4.1–11.1)

## 2024-07-12 PROCEDURE — 6360000002 HC RX W HCPCS: Performed by: INTERNAL MEDICINE

## 2024-07-12 PROCEDURE — 6370000000 HC RX 637 (ALT 250 FOR IP): Performed by: INTERNAL MEDICINE

## 2024-07-12 PROCEDURE — 2580000003 HC RX 258: Performed by: INTERNAL MEDICINE

## 2024-07-12 PROCEDURE — 80048 BASIC METABOLIC PNL TOTAL CA: CPT

## 2024-07-12 PROCEDURE — 82962 GLUCOSE BLOOD TEST: CPT

## 2024-07-12 PROCEDURE — 36415 COLL VENOUS BLD VENIPUNCTURE: CPT

## 2024-07-12 PROCEDURE — 85027 COMPLETE CBC AUTOMATED: CPT

## 2024-07-12 PROCEDURE — 82272 OCCULT BLD FECES 1-3 TESTS: CPT

## 2024-07-12 RX ADMIN — INSULIN LISPRO 20 UNITS: 100 INJECTION, SOLUTION INTRAVENOUS; SUBCUTANEOUS at 09:56

## 2024-07-12 RX ADMIN — ACETAMINOPHEN 650 MG: 325 TABLET ORAL at 05:40

## 2024-07-12 RX ADMIN — SODIUM CHLORIDE, PRESERVATIVE FREE 10 ML: 5 INJECTION INTRAVENOUS at 09:59

## 2024-07-12 RX ADMIN — CLOBETASOL PROPIONATE CREAM USP, 0.05%: 0.5 CREAM TOPICAL at 10:00

## 2024-07-12 RX ADMIN — FENOFIBRATE 160 MG: 160 TABLET ORAL at 09:55

## 2024-07-12 RX ADMIN — CARVEDILOL 12.5 MG: 12.5 TABLET, FILM COATED ORAL at 09:55

## 2024-07-12 RX ADMIN — FUROSEMIDE 40 MG: 10 INJECTION, SOLUTION INTRAMUSCULAR; INTRAVENOUS at 09:55

## 2024-07-12 RX ADMIN — LISINOPRIL 10 MG: 5 TABLET ORAL at 09:55

## 2024-07-12 RX ADMIN — Medication 1000 UNITS: at 09:55

## 2024-07-12 ASSESSMENT — PAIN SCALES - GENERAL: PAINLEVEL_OUTOF10: 6

## 2024-07-12 ASSESSMENT — PAIN DESCRIPTION - LOCATION: LOCATION: KNEE

## 2024-07-12 ASSESSMENT — PAIN - FUNCTIONAL ASSESSMENT: PAIN_FUNCTIONAL_ASSESSMENT: PREVENTS OR INTERFERES SOME ACTIVE ACTIVITIES AND ADLS

## 2024-07-12 ASSESSMENT — PAIN DESCRIPTION - DESCRIPTORS: DESCRIPTORS: DISCOMFORT;ACHING

## 2024-07-12 ASSESSMENT — PAIN DESCRIPTION - ORIENTATION: ORIENTATION: LEFT

## 2024-07-12 NOTE — PROGRESS NOTES
End of Shift Note    Bedside shift change report given to Phoebe (oncoming nurse) by Erin Saldana RN (offgoing nurse).  Report included the following information SBAR    Shift worked:  7p-7a     Shift summary and any significant changes:     No significant events throughout the night. X1 BM formed. Occult stool sent. HGB 8.5 today     Concerns for physician to address:  Results occult stool      Zone phone for oncoming shift:          Activity:       Cardiac:   Cardiac Monitoring:  yes - SR    Access:  Current line(s): PIV     Genitourinary:   Urinary Status: Voiding    Respiratory:   O2 Device: None (Room air)    GI:  Current diet: ADULT DIET; Regular; 3 carb choices (45 gm/meal); Low Fat/Low Chol/High Fiber/2 gm Na    Pain Management:   Patient states pain is manageable on current regimen: YES    Skin:  David Scale Score: 21  Interventions: Wound Offloading (Prevention Methods): Bed, pressure redistribution/air, Pillows, Repositioning  Pressure injury: yes - Sacrum    Patient Safety:  Fall Score: Archer Total Score: 45  Fall Risk Interventions  Nursing Judgement-Fall Risk High(Add Comments): Yes  Toilet Every 2 Hours-In Advance of Need: Yes  Hourly Visual Checks: In bed, Awake  Fall Visual Posted: Socks  Room Door Open: Deferred to decrease stimulation  Alarm On: Bed  Patient Moved Closer to Nursing Station: No    Active Consults:  None    Length of Stay:  Expected LOS: 3  Actual LOS: 2      Erin Saldana, RN

## 2024-07-12 NOTE — CARE COORDINATION
Care Management Initial Assessment       RUR: 16%  Readmission? No  1st IM letter given? Yes - 7/10/24  1st  letter given: No      CM introduce self, explain role and confirmed demographics with pt.    Pt lives with spouse in an one story home with three steps to enter.    No hx of home health, SNF or inpatient rehab.    Pt uses [facility] [address] in [place].    At the time of d/c pt's spouse will transport.    CM will follow and assist with d/c planning.       07/12/24 0914   Service Assessment   Patient Orientation Alert and Oriented   Cognition Alert   History Provided By Patient   Primary Caregiver Self   Support Systems Spouse/Significant Other;Children   Patient's Healthcare Decision Maker is: Legal Next of Kin  (Pt's spouse America Diaz)   PCP Verified by CM Yes   Last Visit to PCP Within last 3 months   Prior Functional Level Independent in ADLs/IADLs   Current Functional Level Independent in ADLs/IADLs   Can patient return to prior living arrangement Yes   Family able to assist with home care needs: No   Would you like for me to discuss the discharge plan with any other family members/significant others, and if so, who? Yes  (Pt's spouse America Diaz)   Financial Resources Medicare;Other (Comment)  ([employer])   Community Resources None   Social/Functional History   Lives With Spouse   Type of Home House   Home Equipment Grab bars;Walker - Rolling  (C Pap/Shower chair)   Active  Yes   Discharge Planning   Type of Residence House   Current Services Prior To Admission C-pap;Durable Medical Equipment   Current DME Prior to Arrival Cpap;Walker   Patient expects to be discharged to: House     Advance Care Planning     General Advance Care Planning (ACP) Conversation    Date of Conversation: 7/12/2024  Conducted with: Patient with Decision Making Capacity  Other persons present: None    Healthcare Decision Maker: No healthcare decision makers have been documented.  Click here to complete

## 2024-07-12 NOTE — PROGRESS NOTES
Attempted to schedule PCP hospital follow up appointment. Unable to reach anyone, unable to leave voicemail. [facility] placed [facility] information AVS for patient resource.  Pending patient discharge. Tana Chao, Care Management Assistant

## 2024-07-12 NOTE — DISCHARGE SUMMARY
Hospitalist Discharge Summary     Patient ID:  Sabas Diaz  [id number]  79 y.o.  1945  7/10/2024    PCP on record: Vera Zaragoza MD    Admit date: 7/10/2024  Discharge date and time: 7/12/2024    DISCHARGE DIAGNOSIS:    Patient Active Problem List   Diagnosis    Anemia   CKD  DM  HTN  History of prostate cancer    CONSULTATIONS:  None    Excerpted HPI from H&P of Shayla Fall MD:    Sabas Diaz is a 79 y.o.  male with PMHx significant for insulin dependent diabetes mellitus, hypertension, hx of prostate cancer presented to ED with c/o B/L lower extremity swelling for 2 days. Patient reports B/L chronic skin changes which is chronic. Patient denies new shortness of breath, chest pain, fever, chills     ______________________________________________________________________  DISCHARGE SUMMARY/HOSPITAL COURSE:  for full details see H&P, daily progress notes, labs, consult notes.       Patient admitted for lower extremity swelling, has history of congestive heart failure but no acute exacerbation, on room air,    For anemia patient denied any bleeding fecal occult blood were negative no blood in the urine, discussed with the patient and he had colonoscopy but he does not know when what that, patient has CKD and stated he been borderline anemic for many years  Patient recommended to continue follow-up with his primary care physician and to recheck with his primary care physician about his last colonoscopy and see what is the time for repeat it.      _______________________________________________________________________  Patient seen and examined by me on discharge day.  Pertinent Findings:  Gen:    Not in distress  Chest: Clear lungs  CVS:   Regular rhythm.  No edema  Abd:  Soft, not distended, not tender  Neuro:  Alert,   _______________________________________________________________________  DISCHARGE MEDICATIONS:      Medication List        CHANGE how you take these medications   preparing report for sign off to her PCP) :  35 minutes    Signed:  Abi Caceres MD

## 2024-07-12 NOTE — PLAN OF CARE
Problem: Discharge Planning  Goal: Discharge to home or other facility with appropriate resources  Outcome: Progressing  Flowsheets (Taken 7/12/2024 0223)  Discharge to home or other facility with appropriate resources: Identify barriers to discharge with patient and caregiver     Problem: Pain  Goal: Verbalizes/displays adequate comfort level or baseline comfort level  Outcome: Progressing  Flowsheets (Taken 7/12/2024 0223)  Verbalizes/displays adequate comfort level or baseline comfort level:   Encourage patient to monitor pain and request assistance   Assess pain using appropriate pain scale     Problem: Safety - Adult  Goal: Free from fall injury  Outcome: Progressing  Flowsheets (Taken 7/12/2024 0223)  Free From Fall Injury: Instruct family/caregiver on patient safety     Problem: Chronic Conditions and Co-morbidities  Goal: Patient's chronic conditions and co-morbidity symptoms are monitored and maintained or improved  Outcome: Progressing  Flowsheets (Taken 7/12/2024 0223)  Care Plan - Patient's Chronic Conditions and Co-Morbidity Symptoms are Monitored and Maintained or Improved: Monitor and assess patient's chronic conditions and comorbid symptoms for stability, deterioration, or improvement

## 2024-07-12 NOTE — CARE COORDINATION
Pt is clear from CM standpoint for d/c.    Pt's spouse to transport.      Transition of Care Plan:    RUR: 16%  Prior Level of Functioning: Independent   Disposition: Home with spouse-no needs   If SNF or IPR: Date FOC offered:   Date FOC received:   Accepting facility:   Date authorization started with reference number:   Date authorization received and expires:   Follow up appointments: PCP  DME needed: No DME needed   Transportation at discharge: Pt's spouse   IM/IMM Medicare/ letter given: 7/12/24  Is patient a  and connected with [facility]? No   If yes, was  transfer form completed and VA notified? No  Caregiver Contact: Pt's spouse   Discharge Caregiver contacted prior to discharge? Pt was contact   Care Conference needed? No  Barriers to discharge: None          07/12/24 1352   Services At/After Discharge   Transition of Care Consult (CM Consult) N/A   Services At/After Discharge None    Resource Information Provided? No   Mode of Transport at Discharge Self   Confirm Follow Up Transport Self   Condition of Participation: Discharge Planning   The Plan for Transition of Care is related to the following treatment goals: Goal is to return home with spouse and follow up appointments   The Patient and/or Patient Representative was provided with a Choice of Provider? Patient   The Patient and/Or Patient Representative agree with the Discharge Plan? Yes   Freedom of Choice list was provided with basic dialogue that supports the patient's individualized plan of care/goals, treatment preferences, and shares the quality data associated with the providers?  Yes     Kelli Paulino

## 2024-07-12 NOTE — PLAN OF CARE
Problem: Pain  Goal: Verbalizes/displays adequate comfort level or baseline comfort level  7/12/2024 1447 by Phoebe Mejia RN  Outcome: Adequate for Discharge  7/12/2024 0223 by Erin Saldana RN  Outcome: Progressing  Flowsheets (Taken 7/12/2024 0223)  Verbalizes/displays adequate comfort level or baseline comfort level:   Encourage patient to monitor pain and request assistance   Assess pain using appropriate pain scale     Problem: Pain  Goal: Verbalizes/displays adequate comfort level or baseline comfort level  7/12/2024 1447 by Phoebe Mejia RN  Outcome: Adequate for Discharge  7/12/2024 0223 by Erin Saldana RN  Outcome: Progressing  Flowsheets (Taken 7/12/2024 0223)  Verbalizes/displays adequate comfort level or baseline comfort level:   Encourage patient to monitor pain and request assistance   Assess pain using appropriate pain scale     Problem: Pain  Goal: Verbalizes/displays adequate comfort level or baseline comfort level  7/12/2024 1447 by Phoebe Mejia RN  Outcome: Adequate for Discharge  7/12/2024 0223 by Erin Saldana RN  Outcome: Progressing  Flowsheets (Taken 7/12/2024 0223)  Verbalizes/displays adequate comfort level or baseline comfort level:   Encourage patient to monitor pain and request assistance   Assess pain using appropriate pain scale     Problem: Safety - Adult  Goal: Free from fall injury  7/12/2024 1447 by Phoebe Mejia RN  Outcome: Adequate for Discharge  7/12/2024 0223 by Erin Saldana RN  Outcome: Progressing  Flowsheets (Taken 7/12/2024 0223)  Free From Fall Injury: Instruct family/caregiver on patient safety     Problem: Chronic Conditions and Co-morbidities  Goal: Patient's chronic conditions and co-morbidity symptoms are monitored and maintained or improved  7/12/2024 1447 by Phoebe Mejia RN  Outcome: Adequate for Discharge  7/12/2024 0223 by Erin Saldana RN  Outcome: Progressing  Flowsheets (Taken 7/12/2024 0223)  Care Plan - Patient's Chronic Conditions and

## 2024-07-12 NOTE — PROGRESS NOTES
I have reviewed discharge instructions with the patient and spouse. The patient and spouse verbalized understanding. Discharge medications reviewed with patient and spouse and appropriate educational materials and side effects teaching were provided. Follow-up appointments reviewed. Opportunity for questions and clarification was provided.  Venous access removed without difficulty.  Patient's belongings gathered and sent with patient. Patient is ready for discharge. Pt transported d/c via w/c by staff. No acute distress noted.     Phoebe Mejia RN

## 2024-07-19 ENCOUNTER — APPOINTMENT (OUTPATIENT)
Facility: HOSPITAL | Age: 79
DRG: 689 | End: 2024-07-19
Payer: MEDICARE

## 2024-07-19 ENCOUNTER — TELEPHONE (OUTPATIENT)
Age: 79
End: 2024-07-19

## 2024-07-19 ENCOUNTER — HOSPITAL ENCOUNTER (INPATIENT)
Facility: HOSPITAL | Age: 79
LOS: 3 days | Discharge: HOME HEALTH | DRG: 689 | End: 2024-07-22
Attending: EMERGENCY MEDICINE | Admitting: INTERNAL MEDICINE
Payer: MEDICARE

## 2024-07-19 DIAGNOSIS — M25.562 ACUTE PAIN OF LEFT KNEE: ICD-10-CM

## 2024-07-19 DIAGNOSIS — R41.82 ALTERED MENTAL STATUS, UNSPECIFIED ALTERED MENTAL STATUS TYPE: Primary | ICD-10-CM

## 2024-07-19 DIAGNOSIS — N30.01 ACUTE CYSTITIS WITH HEMATURIA: ICD-10-CM

## 2024-07-19 PROBLEM — G93.41 ACUTE METABOLIC ENCEPHALOPATHY: Status: ACTIVE | Noted: 2024-07-19

## 2024-07-19 LAB
ALBUMIN SERPL-MCNC: 3.1 G/DL (ref 3.5–5)
ALBUMIN/GLOB SERPL: 0.8 (ref 1.1–2.2)
ALP SERPL-CCNC: 42 U/L (ref 45–117)
ALT SERPL-CCNC: 22 U/L (ref 12–78)
AMPHET UR QL SCN: NEGATIVE
ANION GAP SERPL CALC-SCNC: 6 MMOL/L (ref 5–15)
APPEARANCE UR: ABNORMAL
AST SERPL-CCNC: 14 U/L (ref 15–37)
BACTERIA URNS QL MICRO: ABNORMAL /HPF
BARBITURATES UR QL SCN: NEGATIVE
BASOPHILS # BLD: 0 K/UL (ref 0–0.1)
BASOPHILS NFR BLD: 0 % (ref 0–1)
BENZODIAZ UR QL: NEGATIVE
BILIRUB SERPL-MCNC: 0.6 MG/DL (ref 0.2–1)
BILIRUB UR QL: NEGATIVE
BUN SERPL-MCNC: 66 MG/DL (ref 6–20)
BUN/CREAT SERPL: 22 (ref 12–20)
CALCIUM SERPL-MCNC: 9.9 MG/DL (ref 8.5–10.1)
CANNABINOIDS UR QL SCN: NEGATIVE
CHLORIDE SERPL-SCNC: 104 MMOL/L (ref 97–108)
CO2 SERPL-SCNC: 26 MMOL/L (ref 21–32)
COCAINE UR QL SCN: NEGATIVE
COLOR UR: ABNORMAL
CREAT SERPL-MCNC: 3 MG/DL (ref 0.7–1.3)
DIFFERENTIAL METHOD BLD: ABNORMAL
EOSINOPHIL # BLD: 0.2 K/UL (ref 0–0.4)
EOSINOPHIL NFR BLD: 3 % (ref 0–7)
EPITH CASTS URNS QL MICRO: ABNORMAL /LPF
ERYTHROCYTE [DISTWIDTH] IN BLOOD BY AUTOMATED COUNT: 13.6 % (ref 11.5–14.5)
EST. AVERAGE GLUCOSE BLD GHB EST-MCNC: 154 MG/DL
ETHANOL SERPL-MCNC: <10 MG/DL (ref 0–0.08)
GLOBULIN SER CALC-MCNC: 3.9 G/DL (ref 2–4)
GLUCOSE BLD STRIP.AUTO-MCNC: 283 MG/DL (ref 65–117)
GLUCOSE BLD STRIP.AUTO-MCNC: 300 MG/DL (ref 65–117)
GLUCOSE SERPL-MCNC: 322 MG/DL (ref 65–100)
GLUCOSE UR STRIP.AUTO-MCNC: 500 MG/DL
HBA1C MFR BLD: 7 % (ref 4–5.6)
HCT VFR BLD AUTO: 25.2 % (ref 36.6–50.3)
HGB BLD-MCNC: 7.9 G/DL (ref 12.1–17)
HGB UR QL STRIP: ABNORMAL
IMM GRANULOCYTES # BLD AUTO: 0.1 K/UL (ref 0–0.04)
IMM GRANULOCYTES NFR BLD AUTO: 1 % (ref 0–0.5)
KETONES UR QL STRIP.AUTO: NEGATIVE MG/DL
LEUKOCYTE ESTERASE UR QL STRIP.AUTO: ABNORMAL
LYMPHOCYTES # BLD: 2.5 K/UL (ref 0.8–3.5)
LYMPHOCYTES NFR BLD: 28 % (ref 12–49)
Lab: NORMAL
MCH RBC QN AUTO: 28.5 PG (ref 26–34)
MCHC RBC AUTO-ENTMCNC: 31.3 G/DL (ref 30–36.5)
MCV RBC AUTO: 91 FL (ref 80–99)
METHADONE UR QL: NEGATIVE
MONOCYTES # BLD: 1 K/UL (ref 0–1)
MONOCYTES NFR BLD: 11 % (ref 5–13)
NEUTS SEG # BLD: 5.1 K/UL (ref 1.8–8)
NEUTS SEG NFR BLD: 57 % (ref 32–75)
NITRITE UR QL STRIP.AUTO: NEGATIVE
NRBC # BLD: 0 K/UL (ref 0–0.01)
NRBC BLD-RTO: 0 PER 100 WBC
OPIATES UR QL: NEGATIVE
PCP UR QL: NEGATIVE
PH UR STRIP: 5.5 (ref 5–8)
PLATELET # BLD AUTO: 355 K/UL (ref 150–400)
PMV BLD AUTO: 10.5 FL (ref 8.9–12.9)
POTASSIUM SERPL-SCNC: 5.1 MMOL/L (ref 3.5–5.1)
PROT SERPL-MCNC: 7 G/DL (ref 6.4–8.2)
PROT UR STRIP-MCNC: 100 MG/DL
RBC # BLD AUTO: 2.77 M/UL (ref 4.1–5.7)
RBC #/AREA URNS HPF: ABNORMAL /HPF (ref 0–5)
SERVICE CMNT-IMP: ABNORMAL
SERVICE CMNT-IMP: ABNORMAL
SODIUM SERPL-SCNC: 136 MMOL/L (ref 136–145)
SP GR UR REFRACTOMETRY: 1.01
UROBILINOGEN UR QL STRIP.AUTO: 1 EU/DL (ref 0.2–1)
WBC # BLD AUTO: 8.9 K/UL (ref 4.1–11.1)
WBC URNS QL MICRO: >100 /HPF (ref 0–4)

## 2024-07-19 PROCEDURE — 36415 COLL VENOUS BLD VENIPUNCTURE: CPT

## 2024-07-19 PROCEDURE — 6370000000 HC RX 637 (ALT 250 FOR IP): Performed by: INTERNAL MEDICINE

## 2024-07-19 PROCEDURE — 73562 X-RAY EXAM OF KNEE 3: CPT

## 2024-07-19 PROCEDURE — 87040 BLOOD CULTURE FOR BACTERIA: CPT

## 2024-07-19 PROCEDURE — 85025 COMPLETE CBC W/AUTO DIFF WBC: CPT

## 2024-07-19 PROCEDURE — 80053 COMPREHEN METABOLIC PANEL: CPT

## 2024-07-19 PROCEDURE — 99285 EMERGENCY DEPT VISIT HI MDM: CPT

## 2024-07-19 PROCEDURE — 82077 ASSAY SPEC XCP UR&BREATH IA: CPT

## 2024-07-19 PROCEDURE — 2580000003 HC RX 258: Performed by: INTERNAL MEDICINE

## 2024-07-19 PROCEDURE — 70450 CT HEAD/BRAIN W/O DYE: CPT

## 2024-07-19 PROCEDURE — 80307 DRUG TEST PRSMV CHEM ANLYZR: CPT

## 2024-07-19 PROCEDURE — 81001 URINALYSIS AUTO W/SCOPE: CPT

## 2024-07-19 PROCEDURE — 83036 HEMOGLOBIN GLYCOSYLATED A1C: CPT

## 2024-07-19 PROCEDURE — 6360000002 HC RX W HCPCS: Performed by: INTERNAL MEDICINE

## 2024-07-19 PROCEDURE — 82962 GLUCOSE BLOOD TEST: CPT

## 2024-07-19 PROCEDURE — 71045 X-RAY EXAM CHEST 1 VIEW: CPT

## 2024-07-19 PROCEDURE — 1100000000 HC RM PRIVATE

## 2024-07-19 PROCEDURE — 93005 ELECTROCARDIOGRAM TRACING: CPT | Performed by: EMERGENCY MEDICINE

## 2024-07-19 RX ORDER — ONDANSETRON 2 MG/ML
4 INJECTION INTRAMUSCULAR; INTRAVENOUS EVERY 6 HOURS PRN
Status: DISCONTINUED | OUTPATIENT
Start: 2024-07-19 | End: 2024-07-22 | Stop reason: HOSPADM

## 2024-07-19 RX ORDER — HEPARIN SODIUM 5000 [USP'U]/ML
5000 INJECTION, SOLUTION INTRAVENOUS; SUBCUTANEOUS EVERY 8 HOURS SCHEDULED
Status: DISCONTINUED | OUTPATIENT
Start: 2024-07-19 | End: 2024-07-22 | Stop reason: HOSPADM

## 2024-07-19 RX ORDER — INSULIN LISPRO 100 [IU]/ML
0-4 INJECTION, SOLUTION INTRAVENOUS; SUBCUTANEOUS NIGHTLY
Status: DISCONTINUED | OUTPATIENT
Start: 2024-07-19 | End: 2024-07-22 | Stop reason: HOSPADM

## 2024-07-19 RX ORDER — SODIUM CHLORIDE 9 MG/ML
INJECTION, SOLUTION INTRAVENOUS PRN
Status: DISCONTINUED | OUTPATIENT
Start: 2024-07-19 | End: 2024-07-22 | Stop reason: HOSPADM

## 2024-07-19 RX ORDER — FUROSEMIDE 40 MG/1
40 TABLET ORAL DAILY
Status: DISCONTINUED | OUTPATIENT
Start: 2024-07-20 | End: 2024-07-22 | Stop reason: HOSPADM

## 2024-07-19 RX ORDER — SODIUM CHLORIDE 0.9 % (FLUSH) 0.9 %
5-40 SYRINGE (ML) INJECTION EVERY 12 HOURS SCHEDULED
Status: DISCONTINUED | OUTPATIENT
Start: 2024-07-19 | End: 2024-07-22 | Stop reason: HOSPADM

## 2024-07-19 RX ORDER — INSULIN GLARGINE 100 [IU]/ML
80 INJECTION, SOLUTION SUBCUTANEOUS NIGHTLY
Status: DISCONTINUED | OUTPATIENT
Start: 2024-07-19 | End: 2024-07-21

## 2024-07-19 RX ORDER — INSULIN LISPRO 100 [IU]/ML
0-4 INJECTION, SOLUTION INTRAVENOUS; SUBCUTANEOUS
Status: DISCONTINUED | OUTPATIENT
Start: 2024-07-19 | End: 2024-07-22 | Stop reason: HOSPADM

## 2024-07-19 RX ORDER — CARVEDILOL 12.5 MG/1
12.5 TABLET ORAL 2 TIMES DAILY
Status: DISCONTINUED | OUTPATIENT
Start: 2024-07-19 | End: 2024-07-22 | Stop reason: HOSPADM

## 2024-07-19 RX ORDER — SODIUM CHLORIDE 0.9 % (FLUSH) 0.9 %
5-40 SYRINGE (ML) INJECTION PRN
Status: DISCONTINUED | OUTPATIENT
Start: 2024-07-19 | End: 2024-07-22 | Stop reason: HOSPADM

## 2024-07-19 RX ORDER — ACETAMINOPHEN 325 MG/1
650 TABLET ORAL EVERY 6 HOURS PRN
Status: DISCONTINUED | OUTPATIENT
Start: 2024-07-19 | End: 2024-07-22 | Stop reason: HOSPADM

## 2024-07-19 RX ORDER — VITAMIN B COMPLEX
1000 TABLET ORAL DAILY
Status: DISCONTINUED | OUTPATIENT
Start: 2024-07-20 | End: 2024-07-22 | Stop reason: HOSPADM

## 2024-07-19 RX ORDER — ENOXAPARIN SODIUM 100 MG/ML
30 INJECTION SUBCUTANEOUS DAILY
Status: DISCONTINUED | OUTPATIENT
Start: 2024-07-19 | End: 2024-07-19

## 2024-07-19 RX ORDER — DEXTROSE MONOHYDRATE 100 MG/ML
INJECTION, SOLUTION INTRAVENOUS CONTINUOUS PRN
Status: DISCONTINUED | OUTPATIENT
Start: 2024-07-19 | End: 2024-07-22 | Stop reason: HOSPADM

## 2024-07-19 RX ORDER — GLUCAGON 1 MG/ML
1 KIT INJECTION PRN
Status: DISCONTINUED | OUTPATIENT
Start: 2024-07-19 | End: 2024-07-22 | Stop reason: HOSPADM

## 2024-07-19 RX ORDER — ACETAMINOPHEN 650 MG/1
650 SUPPOSITORY RECTAL EVERY 6 HOURS PRN
Status: DISCONTINUED | OUTPATIENT
Start: 2024-07-19 | End: 2024-07-22 | Stop reason: HOSPADM

## 2024-07-19 RX ORDER — FENOFIBRATE 160 MG/1
160 TABLET ORAL DAILY
Status: DISCONTINUED | OUTPATIENT
Start: 2024-07-20 | End: 2024-07-22 | Stop reason: HOSPADM

## 2024-07-19 RX ORDER — POLYETHYLENE GLYCOL 3350 17 G/17G
17 POWDER, FOR SOLUTION ORAL DAILY PRN
Status: DISCONTINUED | OUTPATIENT
Start: 2024-07-19 | End: 2024-07-22 | Stop reason: HOSPADM

## 2024-07-19 RX ORDER — CLONIDINE HYDROCHLORIDE 0.1 MG/1
0.1 TABLET ORAL 2 TIMES DAILY
Status: DISCONTINUED | OUTPATIENT
Start: 2024-07-19 | End: 2024-07-22 | Stop reason: HOSPADM

## 2024-07-19 RX ORDER — ATORVASTATIN CALCIUM 20 MG/1
20 TABLET, FILM COATED ORAL NIGHTLY
Status: DISCONTINUED | OUTPATIENT
Start: 2024-07-19 | End: 2024-07-22 | Stop reason: HOSPADM

## 2024-07-19 RX ORDER — ONDANSETRON 4 MG/1
4 TABLET, ORALLY DISINTEGRATING ORAL EVERY 8 HOURS PRN
Status: DISCONTINUED | OUTPATIENT
Start: 2024-07-19 | End: 2024-07-22 | Stop reason: HOSPADM

## 2024-07-19 RX ORDER — GABAPENTIN 100 MG/1
100 CAPSULE ORAL NIGHTLY
Status: DISCONTINUED | OUTPATIENT
Start: 2024-07-19 | End: 2024-07-22 | Stop reason: HOSPADM

## 2024-07-19 RX ADMIN — SODIUM CHLORIDE, PRESERVATIVE FREE 10 ML: 5 INJECTION INTRAVENOUS at 20:25

## 2024-07-19 RX ADMIN — ACETAMINOPHEN 650 MG: 325 TABLET ORAL at 21:59

## 2024-07-19 RX ADMIN — SODIUM CHLORIDE 1000 MG: 900 INJECTION INTRAVENOUS at 18:14

## 2024-07-19 RX ADMIN — CARVEDILOL 12.5 MG: 12.5 TABLET, FILM COATED ORAL at 20:24

## 2024-07-19 RX ADMIN — GABAPENTIN 100 MG: 100 CAPSULE ORAL at 20:24

## 2024-07-19 RX ADMIN — ATORVASTATIN CALCIUM 20 MG: 20 TABLET, FILM COATED ORAL at 20:25

## 2024-07-19 RX ADMIN — HEPARIN SODIUM 5000 UNITS: 5000 INJECTION INTRAVENOUS; SUBCUTANEOUS at 21:37

## 2024-07-19 RX ADMIN — CLONIDINE HYDROCHLORIDE 0.1 MG: 0.1 TABLET ORAL at 20:25

## 2024-07-19 RX ADMIN — INSULIN HUMAN 6 UNITS: 100 INJECTION, SOLUTION PARENTERAL at 18:20

## 2024-07-19 RX ADMIN — INSULIN LISPRO 4 UNITS: 100 INJECTION, SOLUTION INTRAVENOUS; SUBCUTANEOUS at 21:04

## 2024-07-19 RX ADMIN — INSULIN LISPRO 2 UNITS: 100 INJECTION, SOLUTION INTRAVENOUS; SUBCUTANEOUS at 18:18

## 2024-07-19 RX ADMIN — INSULIN GLARGINE 80 UNITS: 100 INJECTION, SOLUTION SUBCUTANEOUS at 21:03

## 2024-07-19 ASSESSMENT — PAIN SCALES - GENERAL
PAINLEVEL_OUTOF10: 4
PAINLEVEL_OUTOF10: 10
PAINLEVEL_OUTOF10: 0

## 2024-07-19 ASSESSMENT — PAIN DESCRIPTION - LOCATION: LOCATION: HAND

## 2024-07-19 ASSESSMENT — PAIN DESCRIPTION - ORIENTATION: ORIENTATION: LEFT

## 2024-07-19 ASSESSMENT — PAIN DESCRIPTION - DESCRIPTORS: DESCRIPTORS: CRAMPING

## 2024-07-19 NOTE — H&P
Hospitalist Admission Note    NAME:   Sabas Diaz   : 1945   MRN: [id number]     Date/Time: 2024 4:39 PM    Patient PCP: Vera Zaragoza MD    ______________________________________________________________________  Given the patient's current clinical presentation, I have a high level of concern for decompensation if discharged from the emergency department.  Complex decision making was performed, which includes reviewing the patient's available past medical records, laboratory results, and x-ray films.       My assessment of this patient's clinical condition and my plan of care is as follows.    Assessment / Plan:  Acute metabolic encephalopathy  Acute UTI  Continue ceftriaxone  Will monitor mental status  Will follow urine culture  CXR pending  Will follow blood culture      Left knee pain:  Will follow left knee Xray      Hypertension:  Continue coreg 12.5 mg po BID  Hold lisinopril  Continue clonidine 0.1 mg po BID(hold if SBP<140mm Hg)        Type II DM:  Start lantus 80 units sc HS(on lantus 110 units at home)  Continue lispro sliding scale  Will given regular insulin 6 units now (blood sugar 300 in BMP)      Anemia:  Will monitor hb          Medical Decision Making:   I personally reviewed labs: cbc, BMP: elevated creatinine around baseline.   UA remarkable for infection  I personally reviewed imaging:CT head- no acute findings.   I personally reviewed EKG: interpreted by me- normal sinus rhythm, no acute ST-T wave changes.   Toxic drug monitoring:   Type II DM  -continue ISS  -monitor for hypoglycemia due to ISS with serial Accu- Checks  -Hypoglycemia protocol  -diabetic diet     Discussed case with: ED provider. After discussion I am in agreement that acuity of patient's medical condition necessitates hospital stay.      Code Status: full  DVT Prophylaxis: heparin sc   Baseline:     Subjective:   CHIEF COMPLAINT:generalized weakness    HISTORY OF PRESENT ILLNESS:     Sabas Diaz is a 79

## 2024-07-19 NOTE — CARE COORDINATION
Care Management Initial Assessment       RUR: Not listed on chart   Readmission? Yes - 7/10-7/12/24 at [facility] for Anemia   1st IM letter given? Yes - 7/19/24  1st  letter given: No      Initial note: Chart review completed prior to assessment. CM completed assessment with pt's spouse. CM introduced self, role of CM, verified demographics to ensure accuracy, and discussed transition of care planning. Pt resides in 1 level home with 6 NANCY with spouse. Pt's spouse reports that pt has been feeling poorly since return home, had to reschedule PCP appt, and had 1 fall. Pt is ambulatory with rolling walker at baseline. Spouse is pt's main caregiver/support. Spouse to transport at d/c. Rx preference is [facility] in [place]. Care management will continue to be available to assist as transition of care planning needs arise.     Full readmission assessment below:       07/19/24 1814   Service Assessment   Patient Orientation Alert and Oriented   Cognition Alert   History Provided By Spouse  (America Diaz)   Primary Caregiver Self   Accompanied By/Relationship Spouse, America Diaz [phone]   Support Systems Spouse/Significant Other   Patient's Healthcare Decision Maker is: Legal Next of Kin  (Spouse is legal NOK)   PCP Verified by CM Yes  (Dr. Vera Zaragoza)   Prior Functional Level Independent in ADLs/IADLs   Current Functional Level Independent in ADLs/IADLs   Can patient return to prior living arrangement Yes   Ability to make needs known: Good   Family able to assist with home care needs: Yes   Would you like for me to discuss the discharge plan with any other family members/significant others, and if so, who? Yes  (Spouse, America Diaz [phone])   Financial Resources Medicare   Community Resources None   Social/Functional History   Lives With Spouse   Type of Home House   Home Layout One level   Home Access Stairs to enter with rails   Entrance Stairs - Number of Steps 6   Bathroom Equipment None   Home Equipment  spouse - [phone]  Click here to complete Healthcare Decision Makers including selection of the Healthcare Decision Maker Relationship (ie \"Primary\").   Today we documented Decision Maker(s) consistent with Legal Next of Kin hierarchy.    Content/Action Overview:  Has NO ACP documents-Information provided  Reviewed DNR/DNI and patient elects Full Code (Attempt Resuscitation)    Length of Voluntary ACP Conversation in minutes:  <16 minutes (Non-Billable)    BELA Marte.  Care Manager, [facility]  [phone]/Available on Perfect [employer]

## 2024-07-19 NOTE — ED NOTES
Pt. Presents to ED today for complaints of increased confusion after being in the hospital 2 weeks ago.  Pt. Alert and oriented x4 however family states that he is hallucinating.  Pt. Placed in position of comfort with call bell in reach on monitor x3.  Bed alarm on at this time.

## 2024-07-19 NOTE — PROGRESS NOTES
End of Shift Note    Bedside shift change report given to  Rama  (oncoming nurse) by Alcon Ugarte RN .        Shift worked:  Days   Shift summary and any significant changes:     Patient arrived around 1800. Up to bathroom. No other significant changes.        Concerns for physician to address:  None   Zone phone for oncoming shift:   [phone]     Patient Information  Sabas Diaz  79 y.o.  7/19/2024 12:47 PM by Felicita Wilkerson MD. Sabas Diaz was admitted from [facility]    Problem List  Patient Active Problem List    Diagnosis Date Noted    Acute metabolic encephalopathy 07/19/2024    Anemia 07/10/2024     Past Medical History:   Diagnosis Date    Diabetes (HCC)     Hypertension     Prostate CA (HCC)     Sleep apnea        Core Measures:  CVA: no  CHF: yes  PNA: no    Activity:     Number times ambulated in hallways past shift: 0  Number of times OOB to chair past shift: 0    Cardiac:   Cardiac Monitoring: yes, SR    Access:   Current line(s): PIV    Respiratory:   O2 Device: None (Room air)    GI:     Current diet:  ADULT DIET; Regular; 3 carb choices (45 gm/meal); Low Fat/Low Chol/High Fiber/2 gm Na; Low Potassium (Less than 3000 mg/day); Low Phosphorus (Less than 1000 mg)  DIET ONE TIME MESSAGE;  Tolerating current diet: Yes    Pain Management:   Patient states pain is manageable on current regimen: yes    Skin:  David Scale Score: 18  Interventions: N/A  Pressure injury: no    Patient Safety:  Fall Score: Acrher Total Score: 100  Interventions: bed alarm      DVT prophylaxis:  DVT prophylaxis: meds    Active Consults:  None    Length of Stay:  Expected LOS: 3  Actual LOS: 0    Alcon Ugarte RN

## 2024-07-19 NOTE — ED NOTES
Pt. Requesting PO at this time.  Spoke with Dr. Thomas. Will provide patient with PO once results from CT head come back.

## 2024-07-19 NOTE — ED PROVIDER NOTES
[facility] EMERGENCY DEPT  EMERGENCY DEPARTMENT ENCOUNTER       Pt Name: Sabas Diaz  MRN: [id number]  Birthdate 1945  Date of evaluation: 7/19/2024  Provider: Naya Thomas MD   PCP: Vera Zaragoza MD  Note Started: 4:22 PM EDT 7/19/24     CHIEF COMPLAINT       Chief Complaint   Patient presents with    Fatigue     Pt is feeling weak and not feeling well. Denies vomiting or diarrhea. Pt hurts below left knee on left leg when he does stand or walk. Denies a fall. Pt is diabetic-blood sugars \"not really that good\"-has decrease appetite     Fall     Pt fell Wednesday evening outside on the deck when he was coming up the steps. Did not pass out. No known wounds but pt has been \"out of it\" per the wife. Rescue squad was called but did not transport to a hospital at that time.        HISTORY OF PRESENT ILLNESS: 1 or more elements      History From: Patient , History limited by: none     Sabas Diaz is a 79 y.o. male patient with history of diabetes, hypertension, sleep apnea, here for fatigue, left knee pain and confusion.  Patient fell 2 days ago, because his knees gave out on him.  He says he did not lose consciousness, did not hit his head.  His wife says he has not been acting right since then.  He denies chest pain, shortness of breath, headache, neck pain, abdominal pain, dysuria, change in bowel habits.  His left knee pain is 7 out of 10 in severity.       Please See MDM for Additional Details of the HPI/PMH  Nursing Notes were all reviewed and agreed with or any disagreements were addressed in the HPI.     REVIEW OF SYSTEMS        Positives and Pertinent negatives as per HPI.    PAST HISTORY     Past Medical History:  Past Medical History:   Diagnosis Date    Diabetes (HCC)     Hypertension     Prostate CA (HCC)     Sleep apnea        Past Surgical History:  Past Surgical History:   Procedure Laterality Date    VEIN SURGERY         Family History:  History reviewed. No pertinent family history.    Social  History:  Social History     Tobacco Use    Smoking status: Never    Smokeless tobacco: Never   Vaping Use    Vaping Use: Never used       Allergies:  No Known Allergies    CURRENT MEDICATIONS      Previous Medications    ATORVASTATIN (LIPITOR) 20 MG TABLET    TAKE 1 TABLET BY MOUTH ONCE DAILY AS DIRECTED AT BEDTIME FOR 90 DAYS    CARVEDILOL (COREG) 12.5 MG TABLET    Take 1 tablet by mouth 2 times daily    CLOBETASOL (TEMOVATE) 0.05 % CREAM    clobetasol 0.05 % topical cream   APPLY ONCE DAILY TO LOWER LEGS AS NEEDED    CLONIDINE (CATAPRES) 0.1 MG TABLET    Take 1 tablet by mouth 2 times daily    FENOFIBRATE MICRONIZED (LOFIBRA) 134 MG CAPSULE    TAKE 1 CAPSULE BY MOUTH ONCE DAILY WITH A MEAL    FUROSEMIDE (LASIX) 40 MG TABLET    Take 1 tablet by mouth daily    GABAPENTIN (NEURONTIN) 100 MG CAPSULE    TAKE 1 CAPSULE BY MOUTH ONCE DAILY AT BEDTIME    INSULIN ASPART (NOVOLOG FLEXPEN) 100 UNIT/ML INJECTION PEN    INJECT 36 UNITS SUBCUTANEOUSLY WITH BREAKFAST AND 28 UNITS WITH LUNCH AND 28 UNITS WITH DINNER, plus sliding scale up to 140 max units per day    INSULIN GLARGINE, 2 UNIT DIAL, 300 UNIT/ML SOPN    Inject 110 Lipoprotein Lipase Releasing Units into the skin at bedtime    RAMIPRIL (ALTACE) 10 MG CAPSULE    Take 1 capsule by mouth daily    SEMAGLUTIDE, 1 MG/DOSE, (OZEMPIC) 4 MG/3ML SOPN SC INJECTION    Inject 1 mg into the skin every 7 days Sunday    VITAMIN D (CHOLECALCIFEROL) 25 MCG (1000 UT) TABS TABLET    Take 1 tablet by mouth daily       SCREENINGS               No data recorded         PHYSICAL EXAM      ED Triage Vitals [07/19/24 1230]   Enc Vitals Group      BP (!) 153/51      Pulse 70      Respirations 16      Temp 98.4 °F (36.9 °C)      Temp Source Oral      SpO2 99 %      Weight - Scale 102.1 kg (225 lb)      Height 1.778 m (5' 10\")      Head Circumference       Peak Flow       Pain Score       Pain Loc       Pain Edu?       Excl. in GC?         Physical Exam  Vitals and nursing note reviewed.   HENT:

## 2024-07-20 LAB
ANION GAP SERPL CALC-SCNC: 7 MMOL/L (ref 5–15)
BASOPHILS # BLD: 0 K/UL (ref 0–0.1)
BASOPHILS NFR BLD: 0 % (ref 0–1)
BUN SERPL-MCNC: 61 MG/DL (ref 6–20)
BUN/CREAT SERPL: 22 (ref 12–20)
CALCIUM SERPL-MCNC: 9.9 MG/DL (ref 8.5–10.1)
CHLORIDE SERPL-SCNC: 108 MMOL/L (ref 97–108)
CO2 SERPL-SCNC: 25 MMOL/L (ref 21–32)
CREAT SERPL-MCNC: 2.8 MG/DL (ref 0.7–1.3)
DIFFERENTIAL METHOD BLD: ABNORMAL
EKG ATRIAL RATE: 73 BPM
EKG DIAGNOSIS: NORMAL
EKG P AXIS: 63 DEGREES
EKG P-R INTERVAL: 160 MS
EKG Q-T INTERVAL: 356 MS
EKG QRS DURATION: 80 MS
EKG QTC CALCULATION (BAZETT): 392 MS
EKG R AXIS: 39 DEGREES
EKG T AXIS: 47 DEGREES
EKG VENTRICULAR RATE: 73 BPM
EOSINOPHIL # BLD: 0.3 K/UL (ref 0–0.4)
EOSINOPHIL NFR BLD: 3 % (ref 0–7)
ERYTHROCYTE [DISTWIDTH] IN BLOOD BY AUTOMATED COUNT: 13.5 % (ref 11.5–14.5)
GLUCOSE BLD STRIP.AUTO-MCNC: 128 MG/DL (ref 65–117)
GLUCOSE BLD STRIP.AUTO-MCNC: 204 MG/DL (ref 65–117)
GLUCOSE BLD STRIP.AUTO-MCNC: 218 MG/DL (ref 65–117)
GLUCOSE BLD STRIP.AUTO-MCNC: 224 MG/DL (ref 65–117)
GLUCOSE SERPL-MCNC: 159 MG/DL (ref 65–100)
HCT VFR BLD AUTO: 23.5 % (ref 36.6–50.3)
HGB BLD-MCNC: 7.4 G/DL (ref 12.1–17)
IMM GRANULOCYTES # BLD AUTO: 0.1 K/UL (ref 0–0.04)
IMM GRANULOCYTES NFR BLD AUTO: 1 % (ref 0–0.5)
LYMPHOCYTES # BLD: 2.8 K/UL (ref 0.8–3.5)
LYMPHOCYTES NFR BLD: 36 % (ref 12–49)
MCH RBC QN AUTO: 28.7 PG (ref 26–34)
MCHC RBC AUTO-ENTMCNC: 31.5 G/DL (ref 30–36.5)
MCV RBC AUTO: 91.1 FL (ref 80–99)
MONOCYTES # BLD: 0.8 K/UL (ref 0–1)
MONOCYTES NFR BLD: 11 % (ref 5–13)
NEUTS SEG # BLD: 3.8 K/UL (ref 1.8–8)
NEUTS SEG NFR BLD: 49 % (ref 32–75)
NRBC # BLD: 0 K/UL (ref 0–0.01)
NRBC BLD-RTO: 0 PER 100 WBC
PLATELET # BLD AUTO: 324 K/UL (ref 150–400)
PMV BLD AUTO: 10.6 FL (ref 8.9–12.9)
POTASSIUM SERPL-SCNC: 4.9 MMOL/L (ref 3.5–5.1)
RBC # BLD AUTO: 2.58 M/UL (ref 4.1–5.7)
SERVICE CMNT-IMP: ABNORMAL
SODIUM SERPL-SCNC: 140 MMOL/L (ref 136–145)
WBC # BLD AUTO: 7.7 K/UL (ref 4.1–11.1)

## 2024-07-20 PROCEDURE — 82962 GLUCOSE BLOOD TEST: CPT

## 2024-07-20 PROCEDURE — 2580000003 HC RX 258: Performed by: INTERNAL MEDICINE

## 2024-07-20 PROCEDURE — 1100000000 HC RM PRIVATE

## 2024-07-20 PROCEDURE — 6360000002 HC RX W HCPCS: Performed by: INTERNAL MEDICINE

## 2024-07-20 PROCEDURE — 36415 COLL VENOUS BLD VENIPUNCTURE: CPT

## 2024-07-20 PROCEDURE — 87086 URINE CULTURE/COLONY COUNT: CPT

## 2024-07-20 PROCEDURE — 85025 COMPLETE CBC W/AUTO DIFF WBC: CPT

## 2024-07-20 PROCEDURE — 6370000000 HC RX 637 (ALT 250 FOR IP): Performed by: INTERNAL MEDICINE

## 2024-07-20 PROCEDURE — 80048 BASIC METABOLIC PNL TOTAL CA: CPT

## 2024-07-20 RX ORDER — LIDOCAINE 4 G/G
1 PATCH TOPICAL DAILY PRN
Status: DISCONTINUED | OUTPATIENT
Start: 2024-07-20 | End: 2024-07-22 | Stop reason: HOSPADM

## 2024-07-20 RX ADMIN — INSULIN GLARGINE 80 UNITS: 100 INJECTION, SOLUTION SUBCUTANEOUS at 20:32

## 2024-07-20 RX ADMIN — SODIUM CHLORIDE, PRESERVATIVE FREE 10 ML: 5 INJECTION INTRAVENOUS at 20:33

## 2024-07-20 RX ADMIN — CLONIDINE HYDROCHLORIDE 0.1 MG: 0.1 TABLET ORAL at 20:31

## 2024-07-20 RX ADMIN — INSULIN LISPRO 1 UNITS: 100 INJECTION, SOLUTION INTRAVENOUS; SUBCUTANEOUS at 16:40

## 2024-07-20 RX ADMIN — ATORVASTATIN CALCIUM 20 MG: 20 TABLET, FILM COATED ORAL at 20:31

## 2024-07-20 RX ADMIN — CARVEDILOL 12.5 MG: 12.5 TABLET, FILM COATED ORAL at 20:31

## 2024-07-20 RX ADMIN — ACETAMINOPHEN 650 MG: 325 TABLET ORAL at 08:54

## 2024-07-20 RX ADMIN — INSULIN LISPRO 1 UNITS: 100 INJECTION, SOLUTION INTRAVENOUS; SUBCUTANEOUS at 11:32

## 2024-07-20 RX ADMIN — HEPARIN SODIUM 5000 UNITS: 5000 INJECTION INTRAVENOUS; SUBCUTANEOUS at 20:31

## 2024-07-20 RX ADMIN — GABAPENTIN 100 MG: 100 CAPSULE ORAL at 20:31

## 2024-07-20 RX ADMIN — FENOFIBRATE 160 MG: 160 TABLET ORAL at 08:53

## 2024-07-20 RX ADMIN — SODIUM CHLORIDE 1000 MG: 900 INJECTION INTRAVENOUS at 17:31

## 2024-07-20 RX ADMIN — HEPARIN SODIUM 5000 UNITS: 5000 INJECTION INTRAVENOUS; SUBCUTANEOUS at 05:31

## 2024-07-20 RX ADMIN — CARVEDILOL 12.5 MG: 12.5 TABLET, FILM COATED ORAL at 08:53

## 2024-07-20 RX ADMIN — SODIUM CHLORIDE, PRESERVATIVE FREE 10 ML: 5 INJECTION INTRAVENOUS at 08:58

## 2024-07-20 RX ADMIN — CLONIDINE HYDROCHLORIDE 0.1 MG: 0.1 TABLET ORAL at 08:54

## 2024-07-20 RX ADMIN — HEPARIN SODIUM 5000 UNITS: 5000 INJECTION INTRAVENOUS; SUBCUTANEOUS at 14:41

## 2024-07-20 RX ADMIN — ACETAMINOPHEN 650 MG: 325 TABLET ORAL at 20:37

## 2024-07-20 RX ADMIN — FUROSEMIDE 40 MG: 40 TABLET ORAL at 08:54

## 2024-07-20 RX ADMIN — Medication 1000 UNITS: at 08:53

## 2024-07-20 ASSESSMENT — PAIN SCALES - GENERAL
PAINLEVEL_OUTOF10: 5
PAINLEVEL_OUTOF10: 5
PAINLEVEL_OUTOF10: 1
PAINLEVEL_OUTOF10: 8
PAINLEVEL_OUTOF10: 8
PAINLEVEL_OUTOF10: 2
PAINLEVEL_OUTOF10: 2

## 2024-07-20 ASSESSMENT — PAIN DESCRIPTION - DESCRIPTORS
DESCRIPTORS: ACHING
DESCRIPTORS: ACHING;DISCOMFORT
DESCRIPTORS: ACHING

## 2024-07-20 ASSESSMENT — PAIN DESCRIPTION - LOCATION
LOCATION: KNEE

## 2024-07-20 ASSESSMENT — PAIN - FUNCTIONAL ASSESSMENT
PAIN_FUNCTIONAL_ASSESSMENT: ACTIVITIES ARE NOT PREVENTED

## 2024-07-20 ASSESSMENT — PAIN DESCRIPTION - ORIENTATION
ORIENTATION: LEFT

## 2024-07-20 ASSESSMENT — PAIN DESCRIPTION - FREQUENCY
FREQUENCY: CONTINUOUS
FREQUENCY: CONTINUOUS
FREQUENCY: INTERMITTENT

## 2024-07-20 ASSESSMENT — PAIN DESCRIPTION - PAIN TYPE
TYPE: CHRONIC PAIN
TYPE: CHRONIC PAIN

## 2024-07-20 ASSESSMENT — PAIN DESCRIPTION - ONSET: ONSET: GRADUAL

## 2024-07-20 NOTE — PLAN OF CARE
Problem: Discharge Planning  Goal: Discharge to home or other facility with appropriate resources  7/19/2024 2130 by Rama Linn RN  Outcome: Progressing  7/19/2024 1809 by Alcon Ugarte RN  Outcome: Progressing     Problem: Pain  Goal: Verbalizes/displays adequate comfort level or baseline comfort level  7/19/2024 2130 by Rama Linn RN  Outcome: Progressing  7/19/2024 1809 by Alcon Ugarte RN  Outcome: Progressing     Problem: Skin/Tissue Integrity  Goal: Absence of new skin breakdown  Description: 1.  Monitor for areas of redness and/or skin breakdown  2.  Assess vascular access sites hourly  3.  Every 4-6 hours minimum:  Change oxygen saturation probe site  4.  Every 4-6 hours:  If on nasal continuous positive airway pressure, respiratory therapy assess nares and determine need for appliance change or resting period.  7/19/2024 2130 by Rama Linn RN  Outcome: Progressing  7/19/2024 1809 by Alcon Ugarte RN  Outcome: Progressing     Problem: Safety - Adult  Goal: Free from fall injury  7/19/2024 2130 by Rama Linn RN  Outcome: Progressing  7/19/2024 1809 by Alcon Ugarte RN  Outcome: Progressing     Problem: ABCDS Injury Assessment  Goal: Absence of physical injury  7/19/2024 2130 by Rama Linn RN  Outcome: Progressing  7/19/2024 1809 by Alcon Ugarte RN  Outcome: Progressing     Problem: Neurosensory - Adult  Goal: Achieves stable or improved neurological status  7/19/2024 2130 by Rama Linn RN  Outcome: Progressing  7/19/2024 1809 by Alcon Ugarte RN  Outcome: Progressing  Goal: Absence of seizures  7/19/2024 2130 by Rama Linn RN  Outcome: Progressing  7/19/2024 1809 by Alcon Ugarte RN  Outcome: Progressing  Goal: Remains free of injury related to seizures activity  7/19/2024 2130 by Rama Linn RN  Outcome: Progressing  7/19/2024 1809 by Alcon Ugarte RN  Outcome: Progressing  Goal: Achieves maximal functionality and self  care  7/19/2024 2130 by Rama Linn RN  Outcome: Progressing  7/19/2024 1809 by Alcon Ugarte RN  Outcome: Progressing     Problem: Respiratory - Adult  Goal: Achieves optimal ventilation and oxygenation  7/19/2024 2130 by Rama Linn RN  Outcome: Progressing  7/19/2024 1809 by Alcon Ugarte RN  Outcome: Progressing     Problem: Cardiovascular - Adult  Goal: Maintains optimal cardiac output and hemodynamic stability  7/19/2024 2130 by Rama Linn RN  Outcome: Progressing  7/19/2024 1809 by Alcon Ugarte RN  Outcome: Progressing     Problem: Gastrointestinal - Adult  Goal: Minimal or absence of nausea and vomiting  7/19/2024 2130 by Rama Linn RN  Outcome: Progressing  7/19/2024 1809 by Alcon Ugarte RN  Outcome: Progressing     Problem: Genitourinary - Adult  Goal: Absence of urinary retention  7/19/2024 2130 by Rama Linn RN  Outcome: Progressing  7/19/2024 1809 by Alcon Ugarte RN  Outcome: Progressing     Problem: Infection - Adult  Goal: Absence of infection at discharge  7/19/2024 2130 by Rama Linn RN  Outcome: Progressing  7/19/2024 1809 by Alcon Ugarte RN  Outcome: Progressing     Problem: Metabolic/Fluid and Electrolytes - Adult  Goal: Electrolytes maintained within normal limits  7/19/2024 2130 by Rama Linn RN  Outcome: Progressing  7/19/2024 1809 by Alcon Ugarte RN  Outcome: Progressing

## 2024-07-20 NOTE — PROGRESS NOTES
End of Shift Note    Bedside shift change report [facility] by Rama Linn, LISA .        Shift worked: 7p-7a   Shift summary and any significant changes:    Up in chair sitting most of shift-voided several times-cxray done-no events during shift       Concerns for physician to address:  None   [facility]   [phone]     Patient Information  Sabas Diaz  79 y.o.  7/19/2024 12:47 PM by Felicita Wilkerson MD. Sabas Diaz was admitted from [facility]    Problem List  Patient Active Problem List    Diagnosis Date Noted    Acute metabolic encephalopathy 07/19/2024    Anemia 07/10/2024     Past Medical History:   Diagnosis Date    Diabetes (HCC)     Hypertension     Prostate CA (HCC)     Sleep apnea        Core Measures:  CVA: no  CHF: yes  PNA: no    Activity:  Level of Assistance: Contact guard assist, steading assist  Number times ambulated in hallways past shift: 0  Number of times OOB to chair past shift: 0    Cardiac:   Cardiac Monitoring: yes, SR    Access:   Current line(s): PIV    Respiratory:   O2 Device: None (Room air)    GI:  Last BM (including prior to admit): 07/16/24  Current diet:  ADULT DIET; Regular; 3 carb choices (45 gm/meal); Low Fat/Low Chol/High Fiber/2 gm Na; Low Potassium (Less than 3000 mg/day); Low Phosphorus (Less than 1000 mg)  DIET ONE TIME MESSAGE;  Tolerating current diet: Yes    Pain Management:   Patient states pain is manageable on current regimen: yes    Skin:  David Scale Score: 20  Interventions: N/A  Pressure injury: no    Patient Safety:  Fall Score: Archer Total Score: 85  Interventions: bed alarm      DVT prophylaxis:  DVT prophylaxis: meds    Active Consults:  None    Length of Stay:  Expected LOS: 3  Actual LOS: 1    Rama Linn, RN

## 2024-07-20 NOTE — PROGRESS NOTES
End of Shift Note    Bedside shift change report given to  LISA Rondon (oncoming nurse) by Debbie Spain RN .        Shift worked:  Days   Shift summary and any significant changes:     No acute changes this shift  Urine culture complete  Pending knee Xray results       Concerns for physician to address:  None   Zone phone for oncoming shift:   [phone]     Patient Information  Sabas Diaz  79 y.o.  7/19/2024 12:47 PM by Felicita Wilkerson MD. Sabas Diaz was admitted from [facility]    Problem List  Patient Active Problem List    Diagnosis Date Noted    Acute metabolic encephalopathy 07/19/2024    Anemia 07/10/2024     Past Medical History:   Diagnosis Date    Diabetes (HCC)     Hypertension     Prostate CA (HCC)     Sleep apnea        Core Measures:  CVA: no  CHF: yes  PNA: no    Activity:  Level of Assistance: Contact guard assist, steading assist  Number times ambulated in hallways past shift: 0  Number of times OOB to chair past shift: 0    Cardiac:   Cardiac Monitoring: yes, SR    Access:   Current line(s): PIV    Respiratory:   O2 Device: None (Room air)    GI:  Last BM (including prior to admit): 07/16/24  Current diet:  ADULT DIET; Regular; 3 carb choices (45 gm/meal); Low Fat/Low Chol/High Fiber/2 gm Na; Low Potassium (Less than 3000 mg/day); Low Phosphorus (Less than 1000 mg)  DIET ONE TIME MESSAGE;  Tolerating current diet: Yes    Pain Management:   Patient states pain is manageable on current regimen: yes    Skin:  David Scale Score: 20  Interventions: N/A  Pressure injury: no    Patient Safety:  Fall Score: Archer Total Score: 85  Interventions: bed alarm      DVT prophylaxis:  DVT prophylaxis: meds    Active Consults:  None    Length of Stay:  Expected LOS: 3  Actual LOS: 1    Debbie Spain RN

## 2024-07-20 NOTE — PLAN OF CARE
Problem: Discharge Planning  Goal: Discharge to home or other facility with appropriate resources  7/20/2024 1022 by Debbie Spain RN  Outcome: Progressing  7/19/2024 2130 by Rama Linn RN  Outcome: Progressing  Flowsheets (Taken 7/19/2024 2005)  Discharge to home or other facility with appropriate resources:   Identify barriers to discharge with patient and caregiver   Arrange for needed discharge resources and transportation as appropriate   Identify discharge learning needs (meds, wound care, etc)   Refer to discharge planning if patient needs post-hospital services based on physician order or complex needs related to functional status, cognitive ability or social support system     Problem: Pain  Goal: Verbalizes/displays adequate comfort level or baseline comfort level  7/20/2024 1022 by Debbie Spain RN  Outcome: Progressing  7/19/2024 2130 by Rama Linn RN  Outcome: Progressing     Problem: Skin/Tissue Integrity  Goal: Absence of new skin breakdown  Description: 1.  Monitor for areas of redness and/or skin breakdown  2.  Assess vascular access sites hourly  3.  Every 4-6 hours minimum:  Change oxygen saturation probe site  4.  Every 4-6 hours:  If on nasal continuous positive airway pressure, respiratory therapy assess nares and determine need for appliance change or resting period.  7/20/2024 1022 by Debbie Spain RN  Outcome: Progressing  7/19/2024 2130 by Rama Linn RN  Outcome: Progressing     Problem: Safety - Adult  Goal: Free from fall injury  7/20/2024 1022 by Debbie Spain RN  Outcome: Progressing  7/19/2024 2130 by Rama Linn RN  Outcome: Progressing  Flowsheets (Taken 7/19/2024 2005)  Free From Fall Injury: Instruct family/caregiver on patient safety     Problem: ABCDS Injury Assessment  Goal: Absence of physical injury  7/20/2024 1022 by Debbie Spain RN  Outcome: Progressing  7/19/2024 2130 by Rama Linn RN  Outcome: Progressing     Problem:

## 2024-07-20 NOTE — PROGRESS NOTES
End of Shift Note     Bedside shift change report given to Shona GONZALEZ (oncoming nurse) by Alcon Ugarte RN .          Shift worked:  Days   Shift summary and any significant changes:      No significant changes. Up to chair for meals.         Concerns for physician to address:  None   Zone phone for oncoming shift:   [phone]      Patient Information  Sabas Diaz  79 y.o.  7/19/2024 12:47 PM by Felicita Wilkerson MD. Sabas Diaz was admitted from [facility]     Problem List       Patient Active Problem List     Diagnosis Date Noted    Acute metabolic encephalopathy 07/19/2024    Anemia 07/10/2024      Past Medical History        Past Medical History:   Diagnosis Date    Diabetes (HCC)      Hypertension      Prostate CA (HCC)      Sleep apnea              Core Measures:  CVA: no  CHF: yes  PNA: no     Activity:  Number times ambulated in hallways past shift: 0  Number of times OOB to chair past shift: 0     Cardiac:   Cardiac Monitoring: yes, SR     Access:   Current line(s): PIV     Respiratory:   O2 Device: None (Room air)     GI:  Current diet:  ADULT DIET; Regular; 3 carb choices (45 gm/meal); Low Fat/Low Chol/High Fiber/2 gm Na; Low Potassium (Less than 3000 mg/day); Low Phosphorus (Less than 1000 mg)  DIET ONE TIME MESSAGE;  Tolerating current diet: Yes     Pain Management:   Patient states pain is manageable on current regimen: yes     Skin:  David Scale Score: 18  Interventions: N/A  Pressure injury: no    Patient Safety:  Fall Score: Archer Total Score: 100  Interventions: bed alarm        DVT prophylaxis:  DVT prophylaxis: meds     Active Consults:  None     Length of Stay:  Expected LOS: 3  Actual LOS:1

## 2024-07-20 NOTE — DISCHARGE SUMMARY
Hospitalist Progress Note    NAME:   Sabas Diaz   : 1945   MRN: [id number]     Date/Time: 2024 2:02 PM  Patient PCP: Vera Zaragoza MD    Estimated discharge date: 24  Barriers: clinical improvement,  urine culture      Assessment / Plan:    Acute metabolic encephalopathy  Acute UTI  Continue ceftriaxone  Will monitor mental status  Urine culture not sent- will order now  Will follow blood culture        Left knee pain:  Will follow left knee Xray        Hypertension:  Continue coreg 12.5 mg po BID  Hold lisinopril  Continue clonidine 0.1 mg po BID(hold if SBP<140mm Hg)             Type II DM:  Continue  lantus 80 units sc HS(on lantus 110 units at home)  Continue lispro sliding scale          Anemia:  Will monitor hb             Medical Decision Making:   I personally reviewed labs:cbc: hb trending down from 7.9 to 7.4- will monitor- no s/s of active bleeding  BMP: creatinine down trending from 3 to 2.8      I personally reviewed imaging:CXR report  Blood culture no growth till date    I personally reviewed EKG:  Toxic drug monitoring:   Type II DM  -continue ISS  -monitor for hypoglycemia due to ISS with serial Accu- Checks  -Hypoglycemia protocol  -diabetic diet   Discussed case with: patient, patient's wife and RN        Code Status: full  DVT Prophylaxis:heparin sc   GI Prophylaxis:    Subjective:     Chief Complaint / Reason for Physician Visit  Patient reports feeling better, wife by the bedside..       Objective:     VITALS:   Last 24hrs VS reviewed since prior progress note. Most recent are:  Patient Vitals for the past 24 hrs:   BP Temp Temp src Pulse Resp SpO2   24 0818 (!) 128/54 97.5 °F (36.4 °C) Oral 61 16 98 %   24 (!) 190/82 98.1 °F (36.7 °C) Oral 86 20 98 %   24 1801 (!) 161/51 98.1 °F (36.7 °C) Oral 74 14 98 %   24 1700 (!) 158/56 -- -- 72 22 96 %   24 1445 (!) 160/53 -- -- 72 26 98 %         Intake/Output Summary (Last 24 hours) at

## 2024-07-21 LAB
ANION GAP SERPL CALC-SCNC: 3 MMOL/L (ref 5–15)
BACTERIA SPEC CULT: NORMAL
BASOPHILS # BLD: 0 K/UL (ref 0–0.1)
BASOPHILS NFR BLD: 0 % (ref 0–1)
BUN SERPL-MCNC: 65 MG/DL (ref 6–20)
BUN/CREAT SERPL: 23 (ref 12–20)
CALCIUM SERPL-MCNC: 9.6 MG/DL (ref 8.5–10.1)
CC UR VC: NORMAL
CHLORIDE SERPL-SCNC: 105 MMOL/L (ref 97–108)
CO2 SERPL-SCNC: 28 MMOL/L (ref 21–32)
CREAT SERPL-MCNC: 2.87 MG/DL (ref 0.7–1.3)
DIFFERENTIAL METHOD BLD: ABNORMAL
EOSINOPHIL # BLD: 0.3 K/UL (ref 0–0.4)
EOSINOPHIL NFR BLD: 4 % (ref 0–7)
ERYTHROCYTE [DISTWIDTH] IN BLOOD BY AUTOMATED COUNT: 13.6 % (ref 11.5–14.5)
GLUCOSE BLD STRIP.AUTO-MCNC: 151 MG/DL (ref 65–117)
GLUCOSE BLD STRIP.AUTO-MCNC: 222 MG/DL (ref 65–117)
GLUCOSE BLD STRIP.AUTO-MCNC: 235 MG/DL (ref 65–117)
GLUCOSE BLD STRIP.AUTO-MCNC: 63 MG/DL (ref 65–117)
GLUCOSE BLD STRIP.AUTO-MCNC: 79 MG/DL (ref 65–117)
GLUCOSE SERPL-MCNC: 139 MG/DL (ref 65–100)
HCT VFR BLD AUTO: 22.8 % (ref 36.6–50.3)
HGB BLD-MCNC: 7.3 G/DL (ref 12.1–17)
IMM GRANULOCYTES # BLD AUTO: 0.1 K/UL (ref 0–0.04)
IMM GRANULOCYTES NFR BLD AUTO: 1 % (ref 0–0.5)
LYMPHOCYTES # BLD: 3.2 K/UL (ref 0.8–3.5)
LYMPHOCYTES NFR BLD: 42 % (ref 12–49)
MCH RBC QN AUTO: 29 PG (ref 26–34)
MCHC RBC AUTO-ENTMCNC: 32 G/DL (ref 30–36.5)
MCV RBC AUTO: 90.5 FL (ref 80–99)
MONOCYTES # BLD: 0.7 K/UL (ref 0–1)
MONOCYTES NFR BLD: 9 % (ref 5–13)
NEUTS SEG # BLD: 3.4 K/UL (ref 1.8–8)
NEUTS SEG NFR BLD: 44 % (ref 32–75)
NRBC # BLD: 0 K/UL (ref 0–0.01)
NRBC BLD-RTO: 0 PER 100 WBC
PLATELET # BLD AUTO: 308 K/UL (ref 150–400)
PMV BLD AUTO: 10.4 FL (ref 8.9–12.9)
POTASSIUM SERPL-SCNC: 4.9 MMOL/L (ref 3.5–5.1)
RBC # BLD AUTO: 2.52 M/UL (ref 4.1–5.7)
SERVICE CMNT-IMP: ABNORMAL
SERVICE CMNT-IMP: NORMAL
SERVICE CMNT-IMP: NORMAL
SODIUM SERPL-SCNC: 136 MMOL/L (ref 136–145)
WBC # BLD AUTO: 7.6 K/UL (ref 4.1–11.1)

## 2024-07-21 PROCEDURE — 6370000000 HC RX 637 (ALT 250 FOR IP): Performed by: INTERNAL MEDICINE

## 2024-07-21 PROCEDURE — 80048 BASIC METABOLIC PNL TOTAL CA: CPT

## 2024-07-21 PROCEDURE — 97116 GAIT TRAINING THERAPY: CPT

## 2024-07-21 PROCEDURE — 6360000002 HC RX W HCPCS: Performed by: INTERNAL MEDICINE

## 2024-07-21 PROCEDURE — 36415 COLL VENOUS BLD VENIPUNCTURE: CPT

## 2024-07-21 PROCEDURE — 97166 OT EVAL MOD COMPLEX 45 MIN: CPT

## 2024-07-21 PROCEDURE — 6370000000 HC RX 637 (ALT 250 FOR IP): Performed by: NURSE PRACTITIONER

## 2024-07-21 PROCEDURE — 85025 COMPLETE CBC W/AUTO DIFF WBC: CPT

## 2024-07-21 PROCEDURE — 2580000003 HC RX 258: Performed by: INTERNAL MEDICINE

## 2024-07-21 PROCEDURE — 82962 GLUCOSE BLOOD TEST: CPT

## 2024-07-21 PROCEDURE — 97535 SELF CARE MNGMENT TRAINING: CPT

## 2024-07-21 PROCEDURE — 97162 PT EVAL MOD COMPLEX 30 MIN: CPT

## 2024-07-21 PROCEDURE — 1100000000 HC RM PRIVATE

## 2024-07-21 RX ORDER — CLOBETASOL PROPIONATE 0.5 MG/G
OINTMENT TOPICAL 2 TIMES DAILY
Status: DISCONTINUED | OUTPATIENT
Start: 2024-07-21 | End: 2024-07-22 | Stop reason: HOSPADM

## 2024-07-21 RX ORDER — INSULIN GLARGINE 100 [IU]/ML
78 INJECTION, SOLUTION SUBCUTANEOUS NIGHTLY
Status: DISCONTINUED | OUTPATIENT
Start: 2024-07-21 | End: 2024-07-22 | Stop reason: HOSPADM

## 2024-07-21 RX ADMIN — CARVEDILOL 12.5 MG: 12.5 TABLET, FILM COATED ORAL at 08:44

## 2024-07-21 RX ADMIN — HEPARIN SODIUM 5000 UNITS: 5000 INJECTION INTRAVENOUS; SUBCUTANEOUS at 13:47

## 2024-07-21 RX ADMIN — ATORVASTATIN CALCIUM 20 MG: 20 TABLET, FILM COATED ORAL at 20:25

## 2024-07-21 RX ADMIN — SODIUM CHLORIDE 1000 MG: 900 INJECTION INTRAVENOUS at 17:03

## 2024-07-21 RX ADMIN — Medication 1000 UNITS: at 08:43

## 2024-07-21 RX ADMIN — SODIUM CHLORIDE, PRESERVATIVE FREE 10 ML: 5 INJECTION INTRAVENOUS at 08:49

## 2024-07-21 RX ADMIN — GABAPENTIN 100 MG: 100 CAPSULE ORAL at 20:24

## 2024-07-21 RX ADMIN — CLOBETASOL PROPIONATE: 0.5 OINTMENT TOPICAL at 20:28

## 2024-07-21 RX ADMIN — CARVEDILOL 12.5 MG: 12.5 TABLET, FILM COATED ORAL at 20:24

## 2024-07-21 RX ADMIN — DICLOFENAC SODIUM 2 G: 10 GEL TOPICAL at 00:23

## 2024-07-21 RX ADMIN — CLOBETASOL PROPIONATE: 0.5 OINTMENT TOPICAL at 13:47

## 2024-07-21 RX ADMIN — INSULIN GLARGINE 78 UNITS: 100 INJECTION, SOLUTION SUBCUTANEOUS at 20:25

## 2024-07-21 RX ADMIN — ACETAMINOPHEN 650 MG: 325 TABLET ORAL at 08:43

## 2024-07-21 RX ADMIN — HEPARIN SODIUM 5000 UNITS: 5000 INJECTION INTRAVENOUS; SUBCUTANEOUS at 05:43

## 2024-07-21 RX ADMIN — FENOFIBRATE 160 MG: 160 TABLET ORAL at 08:43

## 2024-07-21 RX ADMIN — CLONIDINE HYDROCHLORIDE 0.1 MG: 0.1 TABLET ORAL at 20:24

## 2024-07-21 RX ADMIN — SODIUM CHLORIDE, PRESERVATIVE FREE 10 ML: 5 INJECTION INTRAVENOUS at 20:32

## 2024-07-21 RX ADMIN — INSULIN LISPRO 1 UNITS: 100 INJECTION, SOLUTION INTRAVENOUS; SUBCUTANEOUS at 17:00

## 2024-07-21 RX ADMIN — FUROSEMIDE 40 MG: 40 TABLET ORAL at 08:44

## 2024-07-21 RX ADMIN — HEPARIN SODIUM 5000 UNITS: 5000 INJECTION INTRAVENOUS; SUBCUTANEOUS at 20:34

## 2024-07-21 ASSESSMENT — PAIN DESCRIPTION - FREQUENCY
FREQUENCY: CONTINUOUS

## 2024-07-21 ASSESSMENT — PAIN SCALES - GENERAL
PAINLEVEL_OUTOF10: 4
PAINLEVEL_OUTOF10: 2
PAINLEVEL_OUTOF10: 3
PAINLEVEL_OUTOF10: 7
PAINLEVEL_OUTOF10: 8
PAINLEVEL_OUTOF10: 8

## 2024-07-21 ASSESSMENT — PAIN DESCRIPTION - LOCATION
LOCATION: LEG
LOCATION: BACK;NECK;RIB CAGE
LOCATION: KNEE
LOCATION: BACK
LOCATION: KNEE

## 2024-07-21 ASSESSMENT — PAIN DESCRIPTION - DESCRIPTORS
DESCRIPTORS: ACHING
DESCRIPTORS: ACHING;THROBBING
DESCRIPTORS: ACHING;THROBBING

## 2024-07-21 ASSESSMENT — PAIN - FUNCTIONAL ASSESSMENT
PAIN_FUNCTIONAL_ASSESSMENT: ACTIVITIES ARE NOT PREVENTED

## 2024-07-21 ASSESSMENT — PAIN DESCRIPTION - ORIENTATION
ORIENTATION: ANTERIOR
ORIENTATION: POSTERIOR
ORIENTATION: LEFT
ORIENTATION: POSTERIOR
ORIENTATION: POSTERIOR

## 2024-07-21 ASSESSMENT — PAIN DESCRIPTION - PAIN TYPE
TYPE: CHRONIC PAIN

## 2024-07-21 NOTE — PROGRESS NOTES
End of Shift Note     Bedside shift change report given to JESSIKA RN (oncoming nurse) by LISA GIBBONS .          Shift worked:  Days   Shift summary and any significant changes:     C/o pain rt shoulder and cervical region,tylenol  given not much improvement .New order for voltarene gel and lidocain patch applied         Concerns for physician to address:  None   Zone phone for oncoming shift:   [phone]      Patient Information  Sabas Diaz  79 y.o.  7/19/2024 12:47 PM by Felicita Wilkerson MD. Sabas Diaz was admitted from [facility]     Problem List       Patient Active Problem List     Diagnosis Date Noted    Acute metabolic encephalopathy 07/19/2024    Anemia 07/10/2024      Past Medical History        Past Medical History:   Diagnosis Date    Diabetes (HCC)      Hypertension      Prostate CA (HCC)      Sleep apnea              Core Measures:  CVA: no  CHF: yes  PNA: no     Activity:  Number times ambulated in hallways past shift: 0  Number of times OOB to chair past shift: 0     Cardiac:   Cardiac Monitoring: yes, SR     Access:   Current line(s): PIV     Respiratory:   O2 Device: None (Room air)     GI:  Current diet:  ADULT DIET; Regular; 3 carb choices (45 gm/meal); Low Fat/Low Chol/High Fiber/2 gm Na; Low Potassium (Less than 3000 mg/day); Low Phosphorus (Less than 1000 mg)  DIET ONE TIME MESSAGE;  Tolerating current diet: Yes     Pain Management:   Patient states pain is manageable on current regimen: yes     Skin:  David Scale Score: 18  Interventions: N/A  Pressure injury: no    Patient Safety:  Fall Score: Archer Total Score: 100  Interventions: bed alarm        DVT prophylaxis:  DVT prophylaxis: meds     Active Consults:  None     Length of Stay:  Expected LOS: 3  Actual LOS:1

## 2024-07-21 NOTE — PROGRESS NOTES
End of Shift Note     Bedside shift change report given to Shona RN (oncoming nurse) by Alcon Ugarte RN .          Shift worked:  Days   Shift summary and any significant changes:     C/o pain rt shoulder and cervical region,tylenol  given not much improvement .New order for voltarene gel and lidocain patch applied         Concerns for physician to address:  None   Zone phone for oncoming shift:   [phone]      Patient Information  Sabas Diaz  79 y.o.  7/19/2024 12:47 PM by Felicita Wilkerson MD. Sabas Diaz was admitted from [facility]     Problem List       Patient Active Problem List     Diagnosis Date Noted    Acute metabolic encephalopathy 07/19/2024    Anemia 07/10/2024      Past Medical History        Past Medical History:   Diagnosis Date    Diabetes (HCC)      Hypertension      Prostate CA (HCC)      Sleep apnea              Core Measures:  CVA: no  CHF: yes  PNA: no     Activity:  Number times ambulated in hallways past shift: 0  Number of times OOB to chair past shift: 0     Cardiac:   Cardiac Monitoring: yes, SR     Access:   Current line(s): PIV     Respiratory:   O2 Device: None (Room air)     GI:  Current diet:  ADULT DIET; Regular; 3 carb choices (45 gm/meal); Low Fat/Low Chol/High Fiber/2 gm Na; Low Potassium (Less than 3000 mg/day); Low Phosphorus (Less than 1000 mg)  DIET ONE TIME MESSAGE;  Tolerating current diet: Yes     Pain Management:   Patient states pain is manageable on current regimen: yes     Skin:  David Scale Score: 18  Interventions: N/A  Pressure injury: no    Patient Safety:  Fall Score: Archer Total Score: 100  Interventions: bed alarm        DVT prophylaxis:  DVT prophylaxis: meds     Active Consults:  None     Length of Stay:  Expected LOS: 3  Actual LOS:1

## 2024-07-21 NOTE — PLAN OF CARE
Problem: Physical Therapy - Adult  Goal: By Discharge: Performs mobility at highest level of function for planned discharge setting.  See evaluation for individualized goals.  Description: FUNCTIONAL STATUS PRIOR TO ADMISSION: Patient's spouse present in room and answered most questions for him. She states he has had a steady decline over the past 1-2 months. Prior to that time he did not use any device to ambulate, now uses RW at all times and needs assist for almost all of his ADLs.    HOME SUPPORT PRIOR TO ADMISSION: The patient lived with his spouse in a 1-story home with 3 steps to enter.    Physical Therapy Goals  Initiated 7/21/2024  1.  Patient will move from supine to sit and sit to supine in bed with supervision/set-up within 7 day(s).    2.  Patient will perform sit to stand with supervision/set-up within 7 day(s).  3.  Patient will transfer from bed to chair and chair to bed with supervision/set-up using the least restrictive device within 7 day(s).  4.  Patient will ambulate with supervision/set-up for 200 feet with the least restrictive device within 7 day(s).   5.  Patient will ascend/descend 3 stairs with 1 handrail(s) with supervision/set-up within 7 day(s).   Outcome: Progressing     PHYSICAL THERAPY EVALUATION    Patient: Sabas Diaz (79 y.o. male)  Date: 7/21/2024  Primary Diagnosis: Acute metabolic encephalopathy [G93.41]       Precautions: Restrictions/Precautions: Fall Risk, Bed Alarm                      ASSESSMENT :   DEFICITS/IMPAIRMENTS:   The patient is limited by decreased functional mobility, ROM, strength, activity tolerance, balance, increased pain levels. Patient admitted after GLF and family reported increased confusion since the fall. Received patient sitting up in chair with wife present in room. Patient denies any pain in LLE at rest. Noted decreased AROM of L knee in sitting. Performed transfers with contact guard assist, except minimum assistance to rise up from toilet due

## 2024-07-21 NOTE — PLAN OF CARE
my own shirt on?”  OBJECTIVE DATA SUMMARY:     Past Medical History:   Diagnosis Date    Diabetes (HCC)     Hypertension     Prostate CA (HCC)     Sleep apnea      Past Surgical History:   Procedure Laterality Date    VEIN SURGERY            Expanded or extensive additional review of patient history:   Social/Functional History  Lives With: Spouse  Type of Home: House  Home Layout: One level  Home Access: Stairs to enter with rails  Entrance Stairs - Number of Steps: 6  Entrance Stairs - Rails: Both (too wide to reach both)  Bathroom Shower/Tub: Walk-in shower  Bathroom Equipment: None  Home Equipment: Walker - Rolling  Has the patient had two or more falls in the past year or any fall with injury in the past year?: No (fell on deck steps that resulted in coming to hospital)  Receives Help From: Family  ADL Assistance: Needs assistance  Homemaking Assistance: Needs assistance  Ambulation Assistance: Independent  Transfer Assistance: Independent  Active : Yes  Mode of Transportation: Car  Occupation: Retired      Hand Dominance: right     EXAMINATION OF PERFORMANCE DEFICITS:    Cognitive/Behavioral Status:  Orientation  Overall Orientation Status: Within Normal Limits  Orientation Level: Oriented X4  Cognition  Overall Cognitive Status: Exceptions  Arousal/Alertness: Appears intact  Following Commands: Follows multistep commands with repitition  Attention Span: Attends with cues to redirect  Memory: Decreased recall of recent events  Safety Judgement: Decreased awareness of need for safety  Problem Solving: Decreased awareness of errors  Insights: Decreased awareness of deficits  Initiation: Requires cues for some  Sequencing: Requires cues for some    Hearing:   Hearing  Hearing: Within functional limits    Vision/Perceptual:          Vision  Vision: Impaired  Vision Exceptions: Wears glasses at all times       Range of Motion:   AROM: Generally decreased, functional  PROM: Generally decreased,  to moderate modifications of tasks or assist (eg. physical or verbal) with assist is necessary to enable pt to complete eval   Based on the above components, the patient evaluation is determined to be of the following complexity level: Medium

## 2024-07-21 NOTE — PROGRESS NOTES
Shayla Fall MD  Physician  Internal Medicine     Discharge Summary      Signed     Date of Service: 2024  2:01 PM     Signed                 Hospitalist Progress Note     NAME:              Sabas Diaz   :   1945   MRN:   [id number]      Date/Time: 2024 2:02 PM  Patient PCP: Vera Zaragoza MD     Estimated discharge date: 24  Barriers: clinical improvement,  urine culture        Assessment / Plan:     Acute metabolic encephalopathy  Acute UTI  Continue ceftriaxone  Will monitor mental status  Will follow urine culture  Will follow blood culture        Left knee pain:  Will follow left knee Xray        Hypertension:  Continue coreg 12.5 mg po BID  Hold lisinopril  Continue clonidine 0.1 mg po BID(hold if SBP<140mm Hg)              Type II DM:  Was hypoglycemic this morning  Decrease lantus from  80 to 78 units sc HS(on lantus 110 units at home)  Continue lispro sliding scale           Anemia:  Will monitor hb                 Medical Decision Making:   I personally reviewed labs:cbc: hb trending down from 7.9 to 7.4- will monitor- no s/s of active bleeding  BMP: creatinine down trending from 3 to 2.8        I personally reviewed imaging:CXR report  Blood culture no growth till date     I personally reviewed EKG:  Toxic drug monitoring:   Type II DM  -continue ISS  -monitor for hypoglycemia due to ISS with serial Accu- Checks  -Hypoglycemia protocol  -diabetic diet   Discussed case with: patient, patient's wife and RN           Code Status: full  DVT Prophylaxis:heparin sc   GI Prophylaxis:     Subjective:      Chief Complaint / Reason for Physician Visit  Patient reports neck pain, shoulder pain is better now.      Objective:      VITALS:   Last 24hrs VS reviewed since prior progress note. Most recent are:  Patient Vitals for the past 24 hrs:    BP Temp Temp src Pulse Resp SpO2   24 0818 (!) 128/54 97.5 °F (36.4 °C) Oral 61 16 98 %   24 (!) 190/82 98.1 °F (36.7 °C) Oral

## 2024-07-21 NOTE — PLAN OF CARE
Problem: Discharge Planning  Goal: Discharge to home or other facility with appropriate resources  7/21/2024 1110 by Debbie Spain RN  Outcome: Progressing  7/20/2024 2300 by Ngoc Aviles RN  Outcome: Progressing     Problem: Pain  Goal: Verbalizes/displays adequate comfort level or baseline comfort level  7/21/2024 1110 by Debbie Spain RN  Outcome: Progressing  7/20/2024 2300 by Ngoc Aviles RN  Outcome: Progressing     Problem: Skin/Tissue Integrity  Goal: Absence of new skin breakdown  Description: 1.  Monitor for areas of redness and/or skin breakdown  2.  Assess vascular access sites hourly  3.  Every 4-6 hours minimum:  Change oxygen saturation probe site  4.  Every 4-6 hours:  If on nasal continuous positive airway pressure, respiratory therapy assess nares and determine need for appliance change or resting period.  7/21/2024 1110 by Debbie Spain RN  Outcome: Progressing  7/20/2024 2300 by Ngoc Aviles RN  Outcome: Progressing     Problem: Safety - Adult  Goal: Free from fall injury  7/21/2024 1110 by Debbie Spain RN  Outcome: Progressing  7/20/2024 2300 by Ngoc Aviles RN  Outcome: Progressing     Problem: ABCDS Injury Assessment  Goal: Absence of physical injury  7/21/2024 1110 by Debbie Spain RN  Outcome: Progressing  7/20/2024 2300 by Ngoc Aviles RN  Outcome: Progressing     Problem: Neurosensory - Adult  Goal: Achieves stable or improved neurological status  7/21/2024 1110 by Debbie Spain RN  Outcome: Progressing  7/20/2024 2300 by Ngoc Aviles RN  Outcome: Progressing  Goal: Absence of seizures  7/21/2024 1110 by Debbie Spain RN  Outcome: Progressing  7/20/2024 2300 by Ngoc Aviles RN  Outcome: Progressing  Goal: Remains free of injury related to seizures activity  7/21/2024 1110 by Debbie Spain RN  Outcome: Progressing  7/20/2024 2300 by Ngoc Aviles RN  Outcome: Progressing  Goal: Achieves maximal functionality and self care  7/21/2024  restrictive device within 7 day(s).   5.  Patient will ascend/descend 3 stairs with 1 handrail(s) with supervision/set-up within 7 day(s).   7/21/2024 1033 by Celina Jewell, PT  Outcome: Progressing

## 2024-07-21 NOTE — PLAN OF CARE
Problem: Discharge Planning  Goal: Discharge to home or other facility with appropriate resources  7/20/2024 2300 by Ngoc Aviles RN  Outcome: Progressing  7/20/2024 1022 by Debbie Spain RN  Outcome: Progressing     Problem: Pain  Goal: Verbalizes/displays adequate comfort level or baseline comfort level  7/20/2024 2300 by Ngoc Aviles RN  Outcome: Progressing  7/20/2024 1022 by Debbie Spain RN  Outcome: Progressing     Problem: Skin/Tissue Integrity  Goal: Absence of new skin breakdown  Description: 1.  Monitor for areas of redness and/or skin breakdown  2.  Assess vascular access sites hourly  3.  Every 4-6 hours minimum:  Change oxygen saturation probe site  4.  Every 4-6 hours:  If on nasal continuous positive airway pressure, respiratory therapy assess nares and determine need for appliance change or resting period.  7/20/2024 2300 by Ngoc Aviles RN  Outcome: Progressing  7/20/2024 1022 by Debbie Spain RN  Outcome: Progressing     Problem: Safety - Adult  Goal: Free from fall injury  7/20/2024 2300 by Ngoc Aviles RN  Outcome: Progressing  7/20/2024 1022 by Debbie Spain RN  Outcome: Progressing     Problem: ABCDS Injury Assessment  Goal: Absence of physical injury  7/20/2024 2300 by Ngoc Aviles RN  Outcome: Progressing  7/20/2024 1022 by Debbie Spain RN  Outcome: Progressing     Problem: Neurosensory - Adult  Goal: Achieves stable or improved neurological status  7/20/2024 2300 by Ngoc Aviles RN  Outcome: Progressing  7/20/2024 1022 by Debbie Spain RN  Outcome: Progressing  Goal: Absence of seizures  7/20/2024 2300 by Ngoc Aviles RN  Outcome: Progressing  7/20/2024 1022 by Debbie Spain RN  Outcome: Progressing  Goal: Remains free of injury related to seizures activity  7/20/2024 2300 by Ngoc Aviles RN  Outcome: Progressing  7/20/2024 1022 by Debbie Spain RN  Outcome: Progressing  Goal: Achieves maximal functionality and self care  7/20/2024

## 2024-07-21 NOTE — CARE COORDINATION
CM reviewed chart.  CM noted pt recently discharged to Home with family.  CM spoke with wife and patient at bedside with discussion of SNF/HH recommendations.  Pt and wife are open to rehab participation and reviewing freedom of choice in choosing SNF/HH (pt feels better at home rather than going to another facility).  CM will check back.    Wife and pt declined SNF recommendations and request to have mass referral to HH agencies to [facility] who [facility] and then they will pick from those agencies.  Referral through care port for: [facility], [facility], [facility], [facility], [facility], [facility].    Pt also requesting a wheelchair to be ordered.  CM advised PT has not recommended.  Pt and wife wish to have it so he could go places and be pushed rather than walk the long distances.  CM advised insurance may not cover wheelchair is not recommended.     Wife requesting transportation assistance because she does not drive due to her vertigo and wondering about scheduling rides to physician offices.  Wife reports no family in area.  CM referred pt to inquire using transportation through Medicare (back of her card) and [facility]  resources.    Brenda Throckmorton RN  [facility] Case Management   [phone]

## 2024-07-21 NOTE — PROGRESS NOTES
Nursing contacted Nocturnist/cross cover provider via non-urgent messaging system [employer] and notified patient c/o chronic severe pain shoulder, neck, knee, states tylenol ineffective relief. No other concerns reported. No acute distress reported. No other information provided by nurse. VSS. Ordered diclofenac prn and also lidocaine patch daily prn pain. Will defer further evaluation/management to the day shift primary attending care team. Patient denies any further complaints or concerns. Nursing to notify Hospitalist for further/continued concerns. Will remain available overnight for further concerns if nursing/patient needs. Please note, there are RRT systems in this hospital in place that if nursing has acute or critical patient condition change or concern, this is to help facilitate and notify that patient needs immediate bedside evaluation by a provider.     Non-billable note.

## 2024-07-21 NOTE — PROGRESS NOTES
Shayla Fall MD  Physician  Internal Medicine     Discharge Summary      Signed     Date of Service: 2024  2:01 PM     Signed                 Hospitalist Progress Note     NAME:              Sabas Diaz   :   1945   MRN:   [id number]      Date/Time: 2024 2:02 PM  Patient PCP: Vera Zaragoza MD     Estimated discharge date: 24  Barriers: clinical improvement,  urine culture        Assessment / Plan:     Acute metabolic encephalopathy  Acute UTI  Continue ceftriaxone  Will monitor mental status  Urine culture not sent- will order now  Will follow blood culture        Left knee pain:  Will follow left knee Xray        Hypertension:  Continue coreg 12.5 mg po BID  Hold lisinopril  Continue clonidine 0.1 mg po BID(hold if SBP<140mm Hg)              Type II DM:  Continue  lantus 80 units sc HS(on lantus 110 units at home)  Continue lispro sliding scale           Anemia:  Will monitor hb                 Medical Decision Making:   I personally reviewed labs:cbc: hb trending down from 7.9 to 7.4- will monitor- no s/s of active bleeding  BMP: creatinine down trending from 3 to 2.8        I personally reviewed imaging:CXR report  Blood culture no growth till date     I personally reviewed EKG:  Toxic drug monitoring:   Type II DM  -continue ISS  -monitor for hypoglycemia due to ISS with serial Accu- Checks  -Hypoglycemia protocol  -diabetic diet   Discussed case with: patient, patient's wife and RN           Code Status: full  DVT Prophylaxis:heparin sc   GI Prophylaxis:     Subjective:      Chief Complaint / Reason for Physician Visit  Patient reports feeling better, wife by the bedside..         Objective:      VITALS:   Last 24hrs VS reviewed since prior progress note. Most recent are:  Patient Vitals for the past 24 hrs:    BP Temp Temp src Pulse Resp SpO2   24 0818 (!) 128/54 97.5 °F (36.4 °C) Oral 61 16 98 %   24 (!) 190/82 98.1 °F (36.7 °C) Oral 86 20 98 %   24 1801

## 2024-07-21 NOTE — PROGRESS NOTES
End of Shift Note     Bedside shift change report given to Shona RN (oncoming nurse) by LISA Giang .          Shift worked:  Days   Shift summary and any significant changes:     Knee MRI still waiting to be read  No acute changes this shift          Concerns for physician to address:  None   Zone phone for oncoming shift:   [phone]      Patient Information  Sabas Diaz  79 y.o.  7/19/2024 12:47 PM by Felicita Wilkerson MD. Sabas Diaz was admitted from e     Problem List       Patient Active Problem List     Diagnosis Date Noted    Acute metabolic encephalopathy 07/19/2024    Anemia 07/10/2024      Past Medical History        Past Medical History:   Diagnosis Date    Diabetes (HCC)      Hypertension      Prostate CA (HCC)      Sleep apnea              Core Measures:  CVA: no  CHF: yes  PNA: no     Activity:  Number times ambulated in hallways past shift: 0  Number of times OOB to chair past shift: 0     Cardiac:   Cardiac Monitoring: yes, SR     Access:   Current line(s): PIV     Respiratory:   O2 Device: None (Room air)     GI:  Current diet:  ADULT DIET; Regular; 3 carb choices (45 gm/meal); Low Fat/Low Chol/High Fiber/2 gm Na; Low Potassium (Less than 3000 mg/day); Low Phosphorus (Less than 1000 mg)  DIET ONE TIME MESSAGE;  Tolerating current diet: Yes     Pain Management:   Patient states pain is manageable on current regimen: yes     Skin:  David Scale Score: 18  Interventions: N/A  Pressure injury: no    Patient Safety:  Fall Score: Archer Total Score: 100  Interventions: bed alarm        DVT prophylaxis:  DVT prophylaxis: meds     Active Consults:  None     Length of Stay:  Expected LOS: 3  Actual LOS:1

## 2024-07-21 NOTE — PROGRESS NOTES
End of Shift Note     Bedside shift change report given to JESSIKA RN (oncoming nurse) by LISA GIBBONS .          Shift worked:  Days   Shift summary and any significant changes:     C/o pain rt shoulder and cervical region,tylenol  given not much improvement .New order for voltarene gel and lidocain patch applied         Concerns for physician to address:  None   Zone phone for oncoming shift:   [phone]      Patient Information  Sabas Diaz  79 y.o.  7/19/2024 12:47 PM by Felicita Wilkerson MD. Sabas Diaz was admitted from [facility]     Problem List       Patient Active Problem List     Diagnosis Date Noted    Acute metabolic encephalopathy 07/19/2024    Anemia 07/10/2024      Past Medical History        Past Medical History:   Diagnosis Date    Diabetes (HCC)      Hypertension      Prostate CA (HCC)      Sleep apnea              Core Measures:  CVA: no  CHF: yes  PNA: no     Activity:  Number times ambulated in hallways past shift: 0  Number of times OOB to chair past shift: 0     Cardiac:   Cardiac Monitoring: yes, SR     Access:   Current line(s): PIV     Respiratory:   O2 Device: None (Room air)     GI:  Current diet:  ADULT DIET; Regular; 3 carb choices (45 gm/meal); Low Fat/Low Chol/High Fiber/2 gm Na; Low Potassium (Less than 3000 mg/day); Low Phosphorus (Less than 1000 mg)  DIET ONE TIME MESSAGE;  Tolerating current diet: Yes     Pain Management:   Patient states pain is manageable on current regimen: yes     Skin:  David Scale Score: 18  Interventions: N/A  Pressure injury: no    Patient Safety:  Fall Score: Archer Total Score: 100  Interventions: bed alarm        DVT prophylaxis:  DVT prophylaxis: meds     Active Consults:  None     Length of Stay:  Expected LOS: 3  Actual LOS:1

## 2024-07-22 VITALS
TEMPERATURE: 98.4 F | HEIGHT: 70 IN | RESPIRATION RATE: 16 BRPM | DIASTOLIC BLOOD PRESSURE: 50 MMHG | OXYGEN SATURATION: 96 % | HEART RATE: 55 BPM | SYSTOLIC BLOOD PRESSURE: 135 MMHG | WEIGHT: 225 LBS | BODY MASS INDEX: 32.21 KG/M2

## 2024-07-22 LAB
ANION GAP SERPL CALC-SCNC: 4 MMOL/L (ref 5–15)
BASOPHILS # BLD: 0 K/UL (ref 0–0.1)
BASOPHILS NFR BLD: 1 % (ref 0–1)
BUN SERPL-MCNC: 57 MG/DL (ref 6–20)
BUN/CREAT SERPL: 23 (ref 12–20)
CALCIUM SERPL-MCNC: 10.1 MG/DL (ref 8.5–10.1)
CHLORIDE SERPL-SCNC: 102 MMOL/L (ref 97–108)
CO2 SERPL-SCNC: 29 MMOL/L (ref 21–32)
CREAT SERPL-MCNC: 2.52 MG/DL (ref 0.7–1.3)
DIFFERENTIAL METHOD BLD: ABNORMAL
EOSINOPHIL # BLD: 0.4 K/UL (ref 0–0.4)
EOSINOPHIL NFR BLD: 4 % (ref 0–7)
ERYTHROCYTE [DISTWIDTH] IN BLOOD BY AUTOMATED COUNT: 13.4 % (ref 11.5–14.5)
GLUCOSE BLD STRIP.AUTO-MCNC: 159 MG/DL (ref 65–117)
GLUCOSE BLD STRIP.AUTO-MCNC: 84 MG/DL (ref 65–117)
GLUCOSE SERPL-MCNC: 139 MG/DL (ref 65–100)
HCT VFR BLD AUTO: 25.6 % (ref 36.6–50.3)
HGB BLD-MCNC: 8.1 G/DL (ref 12.1–17)
IMM GRANULOCYTES # BLD AUTO: 0.1 K/UL (ref 0–0.04)
IMM GRANULOCYTES NFR BLD AUTO: 1 % (ref 0–0.5)
LYMPHOCYTES # BLD: 3.4 K/UL (ref 0.8–3.5)
LYMPHOCYTES NFR BLD: 38 % (ref 12–49)
MCH RBC QN AUTO: 28.3 PG (ref 26–34)
MCHC RBC AUTO-ENTMCNC: 31.6 G/DL (ref 30–36.5)
MCV RBC AUTO: 89.5 FL (ref 80–99)
MONOCYTES # BLD: 0.7 K/UL (ref 0–1)
MONOCYTES NFR BLD: 8 % (ref 5–13)
NEUTS SEG # BLD: 4.2 K/UL (ref 1.8–8)
NEUTS SEG NFR BLD: 48 % (ref 32–75)
NRBC # BLD: 0 K/UL (ref 0–0.01)
NRBC BLD-RTO: 0 PER 100 WBC
PLATELET # BLD AUTO: 373 K/UL (ref 150–400)
PMV BLD AUTO: 10.6 FL (ref 8.9–12.9)
POTASSIUM SERPL-SCNC: 4.7 MMOL/L (ref 3.5–5.1)
RBC # BLD AUTO: 2.86 M/UL (ref 4.1–5.7)
SERVICE CMNT-IMP: ABNORMAL
SERVICE CMNT-IMP: NORMAL
SODIUM SERPL-SCNC: 135 MMOL/L (ref 136–145)
WBC # BLD AUTO: 8.8 K/UL (ref 4.1–11.1)

## 2024-07-22 PROCEDURE — 6360000002 HC RX W HCPCS: Performed by: INTERNAL MEDICINE

## 2024-07-22 PROCEDURE — 6370000000 HC RX 637 (ALT 250 FOR IP): Performed by: INTERNAL MEDICINE

## 2024-07-22 PROCEDURE — 82962 GLUCOSE BLOOD TEST: CPT

## 2024-07-22 PROCEDURE — 2580000003 HC RX 258: Performed by: INTERNAL MEDICINE

## 2024-07-22 PROCEDURE — 80048 BASIC METABOLIC PNL TOTAL CA: CPT

## 2024-07-22 PROCEDURE — 85025 COMPLETE CBC W/AUTO DIFF WBC: CPT

## 2024-07-22 PROCEDURE — 36415 COLL VENOUS BLD VENIPUNCTURE: CPT

## 2024-07-22 RX ORDER — CARVEDILOL 6.25 MG/1
6.25 TABLET ORAL 2 TIMES DAILY
Qty: 60 TABLET | Refills: 0 | Status: SHIPPED | OUTPATIENT
Start: 2024-07-22

## 2024-07-22 RX ORDER — CEPHALEXIN 500 MG/1
500 CAPSULE ORAL 3 TIMES DAILY
Qty: 6 CAPSULE | Refills: 0 | Status: SHIPPED | OUTPATIENT
Start: 2024-07-22

## 2024-07-22 RX ORDER — LIDOCAINE 4 G/G
1 PATCH TOPICAL DAILY PRN
Qty: 15 EACH | Refills: 0 | Status: SHIPPED | OUTPATIENT
Start: 2024-07-22

## 2024-07-22 RX ADMIN — FENOFIBRATE 160 MG: 160 TABLET ORAL at 09:00

## 2024-07-22 RX ADMIN — FUROSEMIDE 40 MG: 40 TABLET ORAL at 08:59

## 2024-07-22 RX ADMIN — CLOBETASOL PROPIONATE: 0.5 OINTMENT TOPICAL at 09:02

## 2024-07-22 RX ADMIN — CLONIDINE HYDROCHLORIDE 0.1 MG: 0.1 TABLET ORAL at 09:00

## 2024-07-22 RX ADMIN — HEPARIN SODIUM 5000 UNITS: 5000 INJECTION INTRAVENOUS; SUBCUTANEOUS at 05:54

## 2024-07-22 RX ADMIN — Medication 1000 UNITS: at 09:00

## 2024-07-22 RX ADMIN — SODIUM CHLORIDE, PRESERVATIVE FREE 10 ML: 5 INJECTION INTRAVENOUS at 09:01

## 2024-07-22 ASSESSMENT — PAIN SCALES - GENERAL: PAINLEVEL_OUTOF10: 0

## 2024-07-22 NOTE — DISCHARGE SUMMARY
Discharge Summary    Name: Sabas Diaz  [id number]  YOB: 1945 (Age: 79 y.o.)   Date of Admission: 7/19/2024  Date of Discharge: 7/22/2024  Attending Physician: Shayla Fall MD    Discharge Diagnosis:   Acute metabolic encephalopathy  Acute UTI  Left knee pain  Hypertension  Type II DM  Anemia  CKD stage IV        Consultations:  IP CONSULT TO CASE MANAGEMENT      Brief Admission History/Reason for Admission Per Felicita Wilkerson MD:   Sabas Diaz is a 79 y.o.  male with PMHx significant for insulin dependent diabetes mellitus, hypertension, hx of prostate cancer presented to ED with c/o generalized weakness. Patient's wife by the bedside. Patient states that he has been feeling weak, and has poor oral intake for 2-3 days. Patients reports fall on Wednesday evening on the deck while he was taking steps. Patient reports he had left knee pain before fall. Denies left knee pain currently. Patient denies nausea/vomiting/diarrhea/ abdominal pain.     Brief Hospital Course by Main Problems:     Acute metabolic encephalopathy  Acute UTI vs pneumonia  Was treated with Ceftriaxone   Will discharge on keflex        Left knee pain:  Will follow left knee Xray        Hypertension:  Decrease coreg from 12.5 to 6.25 mg po BID  Hold lisinopril  Continue clonidine 0.1 mg po BID            Type II DM:  Lantus decreased to  78 units sc            Anemia:  Will monitor hb  Discharge Exam:  Patient seen and examined by me on discharge day.  Pertinent Findings:  Patient Vitals for the past 24 hrs:   BP Temp Temp src Pulse Resp SpO2   07/22/24 0854 -- -- -- 55 -- --   07/22/24 0739 (!) 135/50 98.4 °F (36.9 °C) Oral 55 16 96 %   07/21/24 2329 (!) 165/53 -- Oral 65 -- --   07/21/24 2022 (!) 166/53 98.1 °F (36.7 °C) -- 65 18 97 %       Gen:    Not in distress  Chest: Clear lungs  CVS:   Regular rhythm.  No edema  Abd:  Soft, not distended, not tender  Neuro: awake, moving all

## 2024-07-22 NOTE — PROGRESS NOTES
End of Shift Note     Bedside shift change report given to JESSIKA RN (oncoming nurse) by LISA GIBBONS .          Shift worked:  Days   Shift summary and any significant changes:     C/o pain rt shoulder and cervical region,tylenol  given not much improvement .New order for voltarene gel and lidocain patch applied         Concerns for physician to address:  None   Zone phone for oncoming shift:   [phone]      Patient Information  Sabas Diaz  79 y.o.  7/19/2024 12:47 PM by Felicita Wilkerson MD. Sabas Diaz was admitted from [facility]     Problem List       Patient Active Problem List     Diagnosis Date Noted    Acute metabolic encephalopathy 07/19/2024    Anemia 07/10/2024      Past Medical History        Past Medical History:   Diagnosis Date    Diabetes (HCC)      Hypertension      Prostate CA (HCC)      Sleep apnea              Core Measures:  CVA: no  CHF: yes  PNA: no     Activity:  Number times ambulated in hallways past shift: 0  Number of times OOB to chair past shift: 0     Cardiac:   Cardiac Monitoring: yes, SR     Access:   Current line(s): PIV     Respiratory:   O2 Device: None (Room air)     GI:  Current diet:  ADULT DIET; Regular; 3 carb choices (45 gm/meal); Low Fat/Low Chol/High Fiber/2 gm Na; Low Potassium (Less than 3000 mg/day); Low Phosphorus (Less than 1000 mg)  DIET ONE TIME MESSAGE;  Tolerating current diet: Yes     Pain Management:   Patient states pain is manageable on current regimen: yes     Skin:  David Scale Score: 18  Interventions: N/A  Pressure injury: no    Patient Safety:  Fall Score: Archer Total Score: 100  Interventions: bed alarm        DVT prophylaxis:  DVT prophylaxis: meds     Active Consults:  None     Length of Stay:  Expected LOS: 3  Actual LOS:1

## 2024-07-22 NOTE — CARE COORDINATION
Home with HH, wife to transport.   Clear from CM.     Transition of Care Plan:    RUR: 21% (high RUR) READMISSION  Prior Level of Functioning: Independent   Disposition: Declined SNF, home with [facility] - PT/OT/SN eval and treat  If SNF or IPR: Date FOC offered: 07/21  Date FOC received: 07/21 - Declined SNF   Accepting facility: Pt declined  Date authorization started with reference number:   Date authorization received and expires:   Follow up appointments: PCP/Specialists as indicated  DME needed: None at this time  Transportation at discharge: Wife to transport  IM/IMM Medicare/ letter given: last given 07/22  Is patient a [place] and connected with [facility]? N/a   If yes, was  transfer form completed and [facility] notified? N/a  Caregiver Contact: Spouse, America Diaz [phone]   Discharge Caregiver contacted prior to discharge? CM to contact if not at bedside  Care Conference needed? Not at this time  Barriers to discharge: None    0838 - Assumed transitions of care planning from CM B. Throckmorton and SHERRILL Roblero. Noted PT/OT rec SNF, pt and family provided FOC and declined SNF placement. HH referrals sent. [facility] accepted for SOC 24-48 hours. Info added to AVS. Pt will need transportation assistance at d/c.     1158 - Per IDRs, plan for d/c today. CM met with pt and wife at bedside. They are agreeable to d/c and HH. Discussed request for WC, family will look into purchasing out of pocket. Wife reported she will transport pt home today. Clear from CM.      07/22/24 1159   Services At/After Discharge   Transition of Care Consult (CM Consult) Home Health   Services At/After Discharge Home Health;PT;OT    Resource Information Provided? No   Mode of Transport at Discharge Other (see comment)  (Wife to transport)   Confirm Follow Up Transport Self   Condition of Participation: Discharge Planning   The Plan for Transition of Care is related to the following treatment goals: Home with HH to continue

## 2024-07-22 NOTE — PROGRESS NOTES
End of Shift Note     Bedside shift change report given to JESSIKA RN (oncoming nurse) by LISA GIBBONS .          Shift worked:  Days   Shift summary and any significant changes:     C/o pain rt shoulder and cervical region and left leg voltarene gel applied         Concerns for physician to address:  None   Zone phone for oncoming shift:   [phone]      Patient Information  Sabas Diaz  79 y.o.  7/19/2024 12:47 PM by Felicita Wilkerson MD. Sabas Diaz was admitted from [facility]     Problem List       Patient Active Problem List     Diagnosis Date Noted    Acute metabolic encephalopathy 07/19/2024    Anemia 07/10/2024      Past Medical History        Past Medical History:   Diagnosis Date    Diabetes (HCC)      Hypertension      Prostate CA (HCC)      Sleep apnea              Core Measures:  CVA: no  CHF: yes  PNA: no     Activity:  Number times ambulated in hallways past shift: 0  Number of times OOB to chair past shift: 0     Cardiac:   Cardiac Monitoring: yes, SR     Access:   Current line(s): PIV     Respiratory:   O2 Device: None (Room air)     GI:  Current diet:  ADULT DIET; Regular; 3 carb choices (45 gm/meal); Low Fat/Low Chol/High Fiber/2 gm Na; Low Potassium (Less than 3000 mg/day); Low Phosphorus (Less than 1000 mg)  DIET ONE TIME MESSAGE;  Tolerating current diet: Yes     Pain Management:   Patient states pain is manageable on current regimen: yes     Skin:  David Scale Score: 18  Interventions: N/A  Pressure injury: no    Patient Safety:  Fall Score: Archer Total Score: 100  Interventions: bed alarm        DVT prophylaxis:  DVT prophylaxis: meds     Active Consults:  None     Length of Stay:  Expected LOS: 3  Actual LOS:1

## 2024-07-22 NOTE — PLAN OF CARE
Problem: Discharge Planning  Goal: Discharge to home or other facility with appropriate resources  7/22/2024 1151 by Debbie Spain RN  Outcome: Progressing  7/21/2024 2318 by Ngoc Aviles RN  Outcome: Progressing     Problem: Pain  Goal: Verbalizes/displays adequate comfort level or baseline comfort level  7/22/2024 1151 by Debbie Spain RN  Outcome: Progressing  7/21/2024 2318 by Ngoc Aviles RN  Outcome: Progressing     Problem: Skin/Tissue Integrity  Goal: Absence of new skin breakdown  Description: 1.  Monitor for areas of redness and/or skin breakdown  2.  Assess vascular access sites hourly  3.  Every 4-6 hours minimum:  Change oxygen saturation probe site  4.  Every 4-6 hours:  If on nasal continuous positive airway pressure, respiratory therapy assess nares and determine need for appliance change or resting period.  7/22/2024 1151 by Debbie Spain RN  Outcome: Progressing  7/21/2024 2318 by Ngoc Aviles RN  Outcome: Progressing     Problem: Safety - Adult  Goal: Free from fall injury  7/22/2024 1151 by Debbie Spain RN  Outcome: Progressing  7/21/2024 2318 by Ngoc Aviles RN  Outcome: Progressing     Problem: ABCDS Injury Assessment  Goal: Absence of physical injury  7/22/2024 1151 by Debbie Spain RN  Outcome: Progressing  7/21/2024 2318 by Ngoc Aviles RN  Outcome: Progressing     Problem: Neurosensory - Adult  Goal: Achieves stable or improved neurological status  7/22/2024 1151 by Debbie Spain RN  Outcome: Progressing  7/21/2024 2318 by Ngoc Aviles RN  Outcome: Progressing  Goal: Absence of seizures  7/22/2024 1151 by Debbie Spain RN  Outcome: Progressing  7/21/2024 2318 by Ngoc Aviles RN  Outcome: Progressing  Goal: Remains free of injury related to seizures activity  7/22/2024 1151 by Debbie Spain RN  Outcome: Progressing  7/21/2024 2318 by Ngoc Aviles RN  Outcome: Progressing  Goal: Achieves maximal functionality and self care  7/22/2024

## 2024-07-22 NOTE — PLAN OF CARE
restrictive device within 7 day(s).   5.  Patient will ascend/descend 3 stairs with 1 handrail(s) with supervision/set-up within 7 day(s).   7/21/2024 1033 by Celina Jewell, PT  Outcome: Progressing

## 2024-07-22 NOTE — PROGRESS NOTES
PCP hospital follow-up transitional care appointment has been scheduled with Dr. Vera Zaragoza on 7/24/24 0800. [facility] placed [facility] information AVS for patient resource.   Pending patient discharge.  Tana Chao, Care Management Assistant

## 2024-07-22 NOTE — PLAN OF CARE
Problem: Discharge Planning  Goal: Discharge to home or other facility with appropriate resources  7/22/2024 1244 by Debbie Spain RN  Outcome: Adequate for Discharge  7/22/2024 1151 by Debbie Spain RN  Outcome: Progressing  7/21/2024 2318 by Ngoc Aviles RN  Outcome: Progressing     Problem: Pain  Goal: Verbalizes/displays adequate comfort level or baseline comfort level  7/22/2024 1244 by Debbie Spain RN  Outcome: Adequate for Discharge  7/22/2024 1151 by Debbie Spain RN  Outcome: Progressing  7/21/2024 2318 by Ngoc Aviles RN  Outcome: Progressing     Problem: Skin/Tissue Integrity  Goal: Absence of new skin breakdown  Description: 1.  Monitor for areas of redness and/or skin breakdown  2.  Assess vascular access sites hourly  3.  Every 4-6 hours minimum:  Change oxygen saturation probe site  4.  Every 4-6 hours:  If on nasal continuous positive airway pressure, respiratory therapy assess nares and determine need for appliance change or resting period.  7/22/2024 1244 by Debbie Spain RN  Outcome: Adequate for Discharge  7/22/2024 1151 by Debbie Spain RN  Outcome: Progressing  7/21/2024 2318 by Ngoc Aviles RN  Outcome: Progressing     Problem: Safety - Adult  Goal: Free from fall injury  7/22/2024 1244 by Debbie Spain RN  Outcome: Adequate for Discharge  7/22/2024 1151 by Debbie Spain RN  Outcome: Progressing  7/21/2024 2318 by Ngoc Aviles RN  Outcome: Progressing     Problem: ABCDS Injury Assessment  Goal: Absence of physical injury  7/22/2024 1244 by Debbie Spain RN  Outcome: Adequate for Discharge  7/22/2024 1151 by Debbie Spain RN  Outcome: Progressing  7/21/2024 2318 by Ngoc Aviles RN  Outcome: Progressing     Problem: Neurosensory - Adult  Goal: Achieves stable or improved neurological status  7/22/2024 1244 by Debbie Spain RN  Outcome: Adequate for Discharge  7/22/2024 1151 by Debbie Spain RN  Outcome: Progressing  7/21/2024 2318

## 2024-07-22 NOTE — PROGRESS NOTES
End of Shift Note     Bedside shift change report given to JESSIKA RN (oncoming nurse) by LISA GIBBONS .          Shift worked:  Days   Shift summary and any significant changes:     C/o pain rt shoulder and cervical region and left leg voltarene gel applied         Concerns for physician to address:  None   Zone phone for oncoming shift:   [phone]      Patient Information  Sabas Diaz  79 y.o.  7/19/2024 12:47 PM by Felicita Wilkerson MD. Sabas Diaz was admitted from [facility]     Problem List       Patient Active Problem List     Diagnosis Date Noted    Acute metabolic encephalopathy 07/19/2024    Anemia 07/10/2024      Past Medical History        Past Medical History:   Diagnosis Date    Diabetes (HCC)      Hypertension      Prostate CA (HCC)      Sleep apnea              Core Measures:  CVA: no  CHF: yes  PNA: no     Activity:  Number times ambulated in hallways past shift: 0  Number of times OOB to chair past shift: 0     Cardiac:   Cardiac Monitoring: yes, SR     Access:   Current line(s): PIV     Respiratory:   O2 Device: None (Room air)     GI:  Current diet:  ADULT DIET; Regular; 3 carb choices (45 gm/meal); Low Fat/Low Chol/High Fiber/2 gm Na; Low Potassium (Less than 3000 mg/day); Low Phosphorus (Less than 1000 mg)  DIET ONE TIME MESSAGE;  Tolerating current diet: Yes     Pain Management:   Patient states pain is manageable on current regimen: yes     Skin:  David Scale Score: 18  Interventions: N/A  Pressure injury: no    Patient Safety:  Fall Score: Archer Total Score: 100  Interventions: bed alarm        DVT prophylaxis:  DVT prophylaxis: meds     Active Consults:  None     Length of Stay:  Expected LOS: 3  Actual LOS:1

## 2024-07-24 LAB — HBA1C MFR BLD HPLC: 7.2 %

## 2024-07-25 LAB
BACTERIA SPEC CULT: NORMAL
BACTERIA SPEC CULT: NORMAL
SERVICE CMNT-IMP: NORMAL
SERVICE CMNT-IMP: NORMAL

## 2024-07-28 NOTE — PROGRESS NOTES
Physician Progress Note      PATIENT:               CANDELARIA CERDA  [facility] #:                  [id number]  :                       1945  ADMIT DATE:       7/10/2024 11:43 AM  DISCH DATE:        2024 2:45 PM  RESPONDING  PROVIDER #:        Abi Bolanos MD          QUERY TEXT:    Patient with PMH DM, CKD, and CHF presented to ED with c/o B/L lower extremity   swelling for 2 days. Patient found to have HGB of 6.7. Per documentation   patient admitted with anemia he denied any bleeding fecal occult blood   negative patient has CKD and stated he been borderline anemic for many years.   Anemia: No clear s/s of bleeding. Anemia due to fluid overload vs anemia of   chronic disease was also noted. Patient with elevated creatinine of 2.84 with   acute anemia and CKD. No acute CHF present. After study could the anemia be   further specified as due to:      The medical record reflects the following:  Risk Factors: CKD    Clinical Indicators: Hgb 6.7  -per PNs \"No clear s/s of bleeding\"    Treatment: 1 unit blood transfusion RBCs  -Check H&H after transfusion  -Check fecal occult test    Please call if you have any questions or need assistance. I can also be   reached via [employer] or [employer] # [phone].  Thank you,  July Norton RN/CDI  Options provided:  -- Anemia due to CKD  -- Anemia due to acute blood loss  -- Anemia due to chronic blood loss  -- Anemia due to acute on chronic blood loss  -- Dilutional anemia  -- Other - I will add my own diagnosis  -- Disagree - Not applicable / Not valid  -- Disagree - Clinically unable to determine / Unknown  -- Refer to Clinical Documentation Reviewer    PROVIDER RESPONSE TEXT:    This patient has anemia due to CKD.    Query created by: July Norton on 2024 5:51 AM      QUERY TEXT:    Pt admitted with Anemia and has CHF documented. If possible, please document   in progress notes and discharge summary further specificity regarding the type   and

## 2024-08-13 ENCOUNTER — TELEPHONE (OUTPATIENT)
Age: 79
End: 2024-08-13

## 2024-08-13 ENCOUNTER — OFFICE VISIT (OUTPATIENT)
Age: 79
End: 2024-08-13

## 2024-08-13 VITALS
DIASTOLIC BLOOD PRESSURE: 67 MMHG | SYSTOLIC BLOOD PRESSURE: 148 MMHG | HEART RATE: 73 BPM | RESPIRATION RATE: 16 BRPM | BODY MASS INDEX: 31.07 KG/M2 | WEIGHT: 217 LBS | OXYGEN SATURATION: 97 % | HEIGHT: 70 IN

## 2024-08-13 DIAGNOSIS — Z79.4 TYPE 2 DIABETES MELLITUS WITH HYPERGLYCEMIA, WITH LONG-TERM CURRENT USE OF INSULIN (HCC): Primary | ICD-10-CM

## 2024-08-13 DIAGNOSIS — Z79.4 TYPE 2 DIABETES MELLITUS WITH STAGE 4 CHRONIC KIDNEY DISEASE, WITH LONG-TERM CURRENT USE OF INSULIN (HCC): ICD-10-CM

## 2024-08-13 DIAGNOSIS — E11.65 TYPE 2 DIABETES MELLITUS WITH HYPERGLYCEMIA, WITH LONG-TERM CURRENT USE OF INSULIN (HCC): Primary | ICD-10-CM

## 2024-08-13 DIAGNOSIS — R80.9 TYPE 2 DIABETES MELLITUS WITH MICROALBUMINURIA, WITH LONG-TERM CURRENT USE OF INSULIN (HCC): ICD-10-CM

## 2024-08-13 DIAGNOSIS — E11.22 TYPE 2 DIABETES MELLITUS WITH STAGE 4 CHRONIC KIDNEY DISEASE, WITH LONG-TERM CURRENT USE OF INSULIN (HCC): ICD-10-CM

## 2024-08-13 DIAGNOSIS — Z79.4 TYPE 2 DIABETES MELLITUS WITH MICROALBUMINURIA, WITH LONG-TERM CURRENT USE OF INSULIN (HCC): ICD-10-CM

## 2024-08-13 DIAGNOSIS — E78.2 MIXED HYPERLIPIDEMIA: ICD-10-CM

## 2024-08-13 DIAGNOSIS — N18.4 TYPE 2 DIABETES MELLITUS WITH STAGE 4 CHRONIC KIDNEY DISEASE, WITH LONG-TERM CURRENT USE OF INSULIN (HCC): ICD-10-CM

## 2024-08-13 DIAGNOSIS — E11.29 TYPE 2 DIABETES MELLITUS WITH MICROALBUMINURIA, WITH LONG-TERM CURRENT USE OF INSULIN (HCC): ICD-10-CM

## 2024-08-13 DIAGNOSIS — I10 ESSENTIAL (PRIMARY) HYPERTENSION: ICD-10-CM

## 2024-08-13 RX ORDER — GLIPIZIDE 10 MG/1
TABLET ORAL
Qty: 30 TABLET | Refills: 3 | Status: SHIPPED | OUTPATIENT
Start: 2024-08-13

## 2024-08-13 NOTE — PATIENT INSTRUCTIONS
Add glipizide 10mg once a day about 15-20 minutes before breakfast.  We may need a dose before dinner or to change to a longer acting option.  We can also increase to 20mg if needed    Lower Toujeo Max to 65 units once a day

## 2024-08-13 NOTE — PROGRESS NOTES
Chief Complaint   Patient presents with    Follow-up       Patient was last seen: 3/6/2024      General:   Required blood transfusion   No longer on meal insulin - stopped while in hospital      Medicate, no deductible, has supplement, no donut hole issues       A1c: current a1c is 7.0  (does have anemia from CRI)    DM Medications:    Toujeo Max 78 units daily - rotating injections  Novolog 36/28/28 15 min before eating =stopped after in hospital    NOT USING SCALE (Hlog)  150-200 4 units  200-250 8 units  250-300 12 units  Over 300  16 units    Ozempic 1mg once a week     Last Changes: :see last note     Sugar Checks: checks more than 4 times a day and uses [employer] CGM       AM: reports:      PM: reports:         LOWs:  has low sugars     DIET: completed the Program for Diabetes [facility]     EXERCISE: does not exercise, exercise limited due to pain     HTN: on ACE-I, on B-Blk, on diuretics, on clonidine, HTN followed by PCP, HTN followed by Cardiology     LIPIDS: on statin, on fenofibrate, lipids followed by PCP, lipids followed by Cardiology    RENAL: **has severe renal insufficiency**, is followed by Nephrology, is on an ACE-I     EYES: has no retinopathy, overdue for eye exam     DENTAL:  has seen dentist in past year     FEET: sees podiatry, has no current issues     HEART:  no chest pain, shortness of breath or claudication, has no cardiac history, is followed by Cardiology for HTN    ASA:  is on aspirin     SYMPTOMS: no polyuria, thirst or blurred vision     THYROID: no known thyroid issue    DIABETES HISTORY:   From initial visit: 10/18/2022    DM 15-20 years  Metformin early on  But then steroids and sugars 500  Put on insulin then      May have been on glipizide  No DPP4, no SGLT, no TZD  On ozempic for ~ 6 mo        LABS/STUDIES:   5/9/22 a1c 8.2  9/13/22 GFR 26, ANNA-r 95, a1c 9.4    10/5/23 GFR 22%, ANNA-r 115, A1c 9.7, Vit D 21.3, Phos 2.8, Mag 2.7,   1/17/24 A1c 8.7, GFR 27%, Hg 9.0    No

## 2024-08-13 NOTE — TELEPHONE ENCOUNTER
Patient called back stating Dr. Armstrong wanted to know the supplier of his   freestyle svitlana 2 so that she can   update his freestyle svitlana 2 to freestyle svitlana 3.    The supplier is [employer] and this is the address-    [address] [place] [id number]

## 2024-08-13 NOTE — PROGRESS NOTES
Sabas Diaz is a 79 y.o. male    Chief Complaint   Patient presents with    Follow-up       BP (!) 148/67   Pulse 73   Resp 16   Ht 1.778 m (5' 10\")   Wt 98.4 kg (217 lb)   SpO2 97%   BMI 31.14 kg/m²         1. Have you been to the ER, urgent care clinic since your last visit?  Hospitalized since your last visit? Yes    2. Have you seen or consulted any other health care providers outside of the [facility] since your last visit?  Include any pap smears or colon screening. No    Learning Assessment:       No data to display                Fall Risk Assessment:       No data to display                Abuse Screening:       No data to display                ADL Screening:       No data to display

## 2024-08-22 ENCOUNTER — CLINICAL DOCUMENTATION (OUTPATIENT)
Age: 79
End: 2024-08-22

## 2024-08-23 RX ORDER — BLOOD-GLUCOSE SENSOR
EACH MISCELLANEOUS
Qty: 6 EACH | Refills: 3 | Status: SHIPPED | OUTPATIENT
Start: 2024-08-23

## 2024-08-27 ENCOUNTER — HOSPITAL ENCOUNTER (INPATIENT)
Facility: HOSPITAL | Age: 79
LOS: 2 days | Discharge: HOME | End: 2024-08-30
Attending: EMERGENCY MEDICINE | Admitting: STUDENT IN AN ORGANIZED HEALTH CARE EDUCATION/TRAINING PROGRAM
Payer: MEDICARE

## 2024-08-27 ENCOUNTER — APPOINTMENT (OUTPATIENT)
Facility: HOSPITAL | Age: 79
End: 2024-08-27
Payer: MEDICARE

## 2024-08-27 DIAGNOSIS — G93.40 ACUTE ENCEPHALOPATHY: Primary | ICD-10-CM

## 2024-08-27 DIAGNOSIS — N30.00 ACUTE CYSTITIS WITHOUT HEMATURIA: ICD-10-CM

## 2024-08-27 LAB
GLUCOSE BLD STRIP.AUTO-MCNC: 312 MG/DL (ref 65–117)
SERVICE CMNT-IMP: ABNORMAL

## 2024-08-27 PROCEDURE — 80053 COMPREHEN METABOLIC PANEL: CPT

## 2024-08-27 PROCEDURE — 71045 X-RAY EXAM CHEST 1 VIEW: CPT

## 2024-08-27 PROCEDURE — 85025 COMPLETE CBC W/AUTO DIFF WBC: CPT

## 2024-08-27 PROCEDURE — 36415 COLL VENOUS BLD VENIPUNCTURE: CPT

## 2024-08-27 PROCEDURE — 87088 URINE BACTERIA CULTURE: CPT

## 2024-08-27 PROCEDURE — 99285 EMERGENCY DEPT VISIT HI MDM: CPT

## 2024-08-27 PROCEDURE — 70450 CT HEAD/BRAIN W/O DYE: CPT

## 2024-08-27 PROCEDURE — 87186 SC STD MICRODIL/AGAR DIL: CPT

## 2024-08-27 PROCEDURE — 82962 GLUCOSE BLOOD TEST: CPT

## 2024-08-27 PROCEDURE — 87086 URINE CULTURE/COLONY COUNT: CPT

## 2024-08-27 PROCEDURE — 81001 URINALYSIS AUTO W/SCOPE: CPT

## 2024-08-27 RX ORDER — 0.9 % SODIUM CHLORIDE 0.9 %
500 INTRAVENOUS SOLUTION INTRAVENOUS ONCE
Status: COMPLETED | OUTPATIENT
Start: 2024-08-27 | End: 2024-08-28

## 2024-08-27 ASSESSMENT — PAIN - FUNCTIONAL ASSESSMENT: PAIN_FUNCTIONAL_ASSESSMENT: NONE - DENIES PAIN

## 2024-08-28 PROBLEM — N39.0 COMPLICATED UTI (URINARY TRACT INFECTION): Status: ACTIVE | Noted: 2024-08-28

## 2024-08-28 LAB
ALBUMIN SERPL-MCNC: 3.3 G/DL (ref 3.5–5)
ALBUMIN/GLOB SERPL: 0.8 (ref 1.1–2.2)
ALP SERPL-CCNC: 44 U/L (ref 45–117)
ALT SERPL-CCNC: 30 U/L (ref 12–78)
ANION GAP SERPL CALC-SCNC: 4 MMOL/L (ref 5–15)
APPEARANCE UR: CLEAR
AST SERPL-CCNC: 20 U/L (ref 15–37)
BACTERIA URNS QL MICRO: NEGATIVE /HPF
BASOPHILS # BLD: 0 K/UL (ref 0–0.1)
BASOPHILS NFR BLD: 0 % (ref 0–1)
BILIRUB SERPL-MCNC: 0.4 MG/DL (ref 0.2–1)
BILIRUB UR QL: NEGATIVE
BUN SERPL-MCNC: 44 MG/DL (ref 6–20)
BUN/CREAT SERPL: 17 (ref 12–20)
CALCIUM SERPL-MCNC: 10.1 MG/DL (ref 8.5–10.1)
CHLORIDE SERPL-SCNC: 102 MMOL/L (ref 97–108)
CO2 SERPL-SCNC: 31 MMOL/L (ref 21–32)
COLOR UR: ABNORMAL
CREAT SERPL-MCNC: 2.56 MG/DL (ref 0.7–1.3)
DIFFERENTIAL METHOD BLD: ABNORMAL
EOSINOPHIL # BLD: 0.2 K/UL (ref 0–0.4)
EOSINOPHIL NFR BLD: 2 % (ref 0–7)
EPITH CASTS URNS QL MICRO: ABNORMAL /LPF
ERYTHROCYTE [DISTWIDTH] IN BLOOD BY AUTOMATED COUNT: 13.9 % (ref 11.5–14.5)
GLOBULIN SER CALC-MCNC: 4.2 G/DL (ref 2–4)
GLUCOSE BLD STRIP.AUTO-MCNC: 184 MG/DL (ref 65–117)
GLUCOSE BLD STRIP.AUTO-MCNC: 188 MG/DL (ref 65–117)
GLUCOSE BLD STRIP.AUTO-MCNC: 250 MG/DL (ref 65–117)
GLUCOSE BLD STRIP.AUTO-MCNC: 257 MG/DL (ref 65–117)
GLUCOSE SERPL-MCNC: 269 MG/DL (ref 65–100)
GLUCOSE UR STRIP.AUTO-MCNC: 250 MG/DL
HCT VFR BLD AUTO: 26.5 % (ref 36.6–50.3)
HGB BLD-MCNC: 8.7 G/DL (ref 12.1–17)
HGB UR QL STRIP: ABNORMAL
HYALINE CASTS URNS QL MICRO: ABNORMAL /LPF (ref 0–2)
IMM GRANULOCYTES # BLD AUTO: 0.1 K/UL (ref 0–0.04)
IMM GRANULOCYTES NFR BLD AUTO: 1 % (ref 0–0.5)
KETONES UR QL STRIP.AUTO: NEGATIVE MG/DL
LEUKOCYTE ESTERASE UR QL STRIP.AUTO: ABNORMAL
LYMPHOCYTES # BLD: 1.7 K/UL (ref 0.8–3.5)
LYMPHOCYTES NFR BLD: 18 % (ref 12–49)
MCH RBC QN AUTO: 29.7 PG (ref 26–34)
MCHC RBC AUTO-ENTMCNC: 32.8 G/DL (ref 30–36.5)
MCV RBC AUTO: 90.4 FL (ref 80–99)
MONOCYTES # BLD: 0.9 K/UL (ref 0–1)
MONOCYTES NFR BLD: 9 % (ref 5–13)
NEUTS SEG # BLD: 6.6 K/UL (ref 1.8–8)
NEUTS SEG NFR BLD: 70 % (ref 32–75)
NITRITE UR QL STRIP.AUTO: NEGATIVE
NRBC # BLD: 0 K/UL (ref 0–0.01)
NRBC BLD-RTO: 0 PER 100 WBC
PH UR STRIP: 6.5 (ref 5–8)
PLATELET # BLD AUTO: 250 K/UL (ref 150–400)
PMV BLD AUTO: 10.3 FL (ref 8.9–12.9)
POTASSIUM SERPL-SCNC: 4.6 MMOL/L (ref 3.5–5.1)
PROT SERPL-MCNC: 7.5 G/DL (ref 6.4–8.2)
PROT UR STRIP-MCNC: 30 MG/DL
RBC # BLD AUTO: 2.93 M/UL (ref 4.1–5.7)
RBC #/AREA URNS HPF: ABNORMAL /HPF (ref 0–5)
SERVICE CMNT-IMP: ABNORMAL
SODIUM SERPL-SCNC: 137 MMOL/L (ref 136–145)
SP GR UR REFRACTOMETRY: 1.01
URINE CULTURE IF INDICATED: ABNORMAL
UROBILINOGEN UR QL STRIP.AUTO: 1 EU/DL (ref 0.2–1)
WBC # BLD AUTO: 9.4 K/UL (ref 4.1–11.1)
WBC URNS QL MICRO: >100 /HPF (ref 0–4)

## 2024-08-28 PROCEDURE — 2580000003 HC RX 258: Performed by: STUDENT IN AN ORGANIZED HEALTH CARE EDUCATION/TRAINING PROGRAM

## 2024-08-28 PROCEDURE — 6360000002 HC RX W HCPCS: Performed by: STUDENT IN AN ORGANIZED HEALTH CARE EDUCATION/TRAINING PROGRAM

## 2024-08-28 PROCEDURE — 82962 GLUCOSE BLOOD TEST: CPT

## 2024-08-28 PROCEDURE — 1100000003 HC PRIVATE W/ TELEMETRY

## 2024-08-28 PROCEDURE — 6370000000 HC RX 637 (ALT 250 FOR IP): Performed by: STUDENT IN AN ORGANIZED HEALTH CARE EDUCATION/TRAINING PROGRAM

## 2024-08-28 PROCEDURE — 96365 THER/PROPH/DIAG IV INF INIT: CPT

## 2024-08-28 PROCEDURE — 2580000003 HC RX 258: Performed by: EMERGENCY MEDICINE

## 2024-08-28 PROCEDURE — 6360000002 HC RX W HCPCS: Performed by: EMERGENCY MEDICINE

## 2024-08-28 RX ORDER — LANOLIN ALCOHOL/MO/W.PET/CERES
3 CREAM (GRAM) TOPICAL NIGHTLY
Status: DISCONTINUED | OUTPATIENT
Start: 2024-08-28 | End: 2024-08-30 | Stop reason: HOSPADM

## 2024-08-28 RX ORDER — GABAPENTIN 100 MG/1
100 CAPSULE ORAL NIGHTLY
Status: DISCONTINUED | OUTPATIENT
Start: 2024-08-28 | End: 2024-08-30 | Stop reason: HOSPADM

## 2024-08-28 RX ORDER — ONDANSETRON 4 MG/1
4 TABLET, ORALLY DISINTEGRATING ORAL EVERY 8 HOURS PRN
Status: DISCONTINUED | OUTPATIENT
Start: 2024-08-28 | End: 2024-08-30 | Stop reason: HOSPADM

## 2024-08-28 RX ORDER — SODIUM CHLORIDE 0.9 % (FLUSH) 0.9 %
5-40 SYRINGE (ML) INJECTION PRN
Status: DISCONTINUED | OUTPATIENT
Start: 2024-08-28 | End: 2024-08-30 | Stop reason: HOSPADM

## 2024-08-28 RX ORDER — ATORVASTATIN CALCIUM 20 MG/1
20 TABLET, FILM COATED ORAL NIGHTLY
Status: DISCONTINUED | OUTPATIENT
Start: 2024-08-28 | End: 2024-08-30 | Stop reason: HOSPADM

## 2024-08-28 RX ORDER — ONDANSETRON 2 MG/ML
4 INJECTION INTRAMUSCULAR; INTRAVENOUS EVERY 6 HOURS PRN
Status: DISCONTINUED | OUTPATIENT
Start: 2024-08-28 | End: 2024-08-30 | Stop reason: HOSPADM

## 2024-08-28 RX ORDER — CARVEDILOL 6.25 MG/1
6.25 TABLET ORAL 2 TIMES DAILY
Status: DISCONTINUED | OUTPATIENT
Start: 2024-08-28 | End: 2024-08-30 | Stop reason: HOSPADM

## 2024-08-28 RX ORDER — INSULIN LISPRO 100 [IU]/ML
0-4 INJECTION, SOLUTION INTRAVENOUS; SUBCUTANEOUS NIGHTLY
Status: DISCONTINUED | OUTPATIENT
Start: 2024-08-28 | End: 2024-08-30 | Stop reason: HOSPADM

## 2024-08-28 RX ORDER — SODIUM CHLORIDE 9 MG/ML
INJECTION, SOLUTION INTRAVENOUS PRN
Status: DISCONTINUED | OUTPATIENT
Start: 2024-08-28 | End: 2024-08-30 | Stop reason: HOSPADM

## 2024-08-28 RX ORDER — ENOXAPARIN SODIUM 100 MG/ML
40 INJECTION SUBCUTANEOUS DAILY
Status: DISCONTINUED | OUTPATIENT
Start: 2024-08-28 | End: 2024-08-28

## 2024-08-28 RX ORDER — INSULIN GLARGINE 100 [IU]/ML
32 INJECTION, SOLUTION SUBCUTANEOUS NIGHTLY
Status: DISCONTINUED | OUTPATIENT
Start: 2024-08-28 | End: 2024-08-30 | Stop reason: HOSPADM

## 2024-08-28 RX ORDER — ACETAMINOPHEN 325 MG/1
650 TABLET ORAL EVERY 4 HOURS PRN
Status: DISCONTINUED | OUTPATIENT
Start: 2024-08-28 | End: 2024-08-30 | Stop reason: HOSPADM

## 2024-08-28 RX ORDER — GLUCAGON 1 MG/ML
1 KIT INJECTION PRN
Status: DISCONTINUED | OUTPATIENT
Start: 2024-08-28 | End: 2024-08-30 | Stop reason: HOSPADM

## 2024-08-28 RX ORDER — DEXTROSE MONOHYDRATE 100 MG/ML
INJECTION, SOLUTION INTRAVENOUS CONTINUOUS PRN
Status: DISCONTINUED | OUTPATIENT
Start: 2024-08-28 | End: 2024-08-30 | Stop reason: HOSPADM

## 2024-08-28 RX ORDER — HEPARIN SODIUM 5000 [USP'U]/ML
5000 INJECTION, SOLUTION INTRAVENOUS; SUBCUTANEOUS 2 TIMES DAILY
Status: DISCONTINUED | OUTPATIENT
Start: 2024-08-28 | End: 2024-08-30 | Stop reason: HOSPADM

## 2024-08-28 RX ORDER — POLYETHYLENE GLYCOL 3350 17 G/17G
17 POWDER, FOR SOLUTION ORAL DAILY PRN
Status: DISCONTINUED | OUTPATIENT
Start: 2024-08-28 | End: 2024-08-30 | Stop reason: HOSPADM

## 2024-08-28 RX ORDER — FENOFIBRATE 160 MG/1
160 TABLET ORAL DAILY
Status: DISCONTINUED | OUTPATIENT
Start: 2024-08-28 | End: 2024-08-30 | Stop reason: HOSPADM

## 2024-08-28 RX ORDER — ACETAMINOPHEN 325 MG/1
650 TABLET ORAL EVERY 6 HOURS PRN
Status: DISCONTINUED | OUTPATIENT
Start: 2024-08-28 | End: 2024-08-30 | Stop reason: HOSPADM

## 2024-08-28 RX ORDER — ACETAMINOPHEN 650 MG/1
650 SUPPOSITORY RECTAL EVERY 6 HOURS PRN
Status: DISCONTINUED | OUTPATIENT
Start: 2024-08-28 | End: 2024-08-30 | Stop reason: HOSPADM

## 2024-08-28 RX ORDER — CLONIDINE HYDROCHLORIDE 0.1 MG/1
0.1 TABLET ORAL 2 TIMES DAILY
Status: DISCONTINUED | OUTPATIENT
Start: 2024-08-28 | End: 2024-08-30 | Stop reason: HOSPADM

## 2024-08-28 RX ORDER — SODIUM CHLORIDE 0.9 % (FLUSH) 0.9 %
5-40 SYRINGE (ML) INJECTION EVERY 12 HOURS SCHEDULED
Status: DISCONTINUED | OUTPATIENT
Start: 2024-08-28 | End: 2024-08-30 | Stop reason: HOSPADM

## 2024-08-28 RX ORDER — INSULIN LISPRO 100 [IU]/ML
5 INJECTION, SOLUTION INTRAVENOUS; SUBCUTANEOUS
Status: DISCONTINUED | OUTPATIENT
Start: 2024-08-28 | End: 2024-08-30 | Stop reason: HOSPADM

## 2024-08-28 RX ORDER — INSULIN LISPRO 100 [IU]/ML
0-8 INJECTION, SOLUTION INTRAVENOUS; SUBCUTANEOUS
Status: DISCONTINUED | OUTPATIENT
Start: 2024-08-28 | End: 2024-08-30 | Stop reason: HOSPADM

## 2024-08-28 RX ADMIN — INSULIN LISPRO 5 UNITS: 100 INJECTION, SOLUTION INTRAVENOUS; SUBCUTANEOUS at 09:08

## 2024-08-28 RX ADMIN — MELATONIN 3 MG: at 20:47

## 2024-08-28 RX ADMIN — CEFTRIAXONE 1000 MG: 1 INJECTION, POWDER, FOR SOLUTION INTRAMUSCULAR; INTRAVENOUS at 00:41

## 2024-08-28 RX ADMIN — INSULIN LISPRO 5 UNITS: 100 INJECTION, SOLUTION INTRAVENOUS; SUBCUTANEOUS at 13:06

## 2024-08-28 RX ADMIN — CARVEDILOL 6.25 MG: 6.25 TABLET, FILM COATED ORAL at 09:07

## 2024-08-28 RX ADMIN — ACETAMINOPHEN 650 MG: 325 TABLET ORAL at 18:29

## 2024-08-28 RX ADMIN — SODIUM CHLORIDE, PRESERVATIVE FREE 10 ML: 5 INJECTION INTRAVENOUS at 09:09

## 2024-08-28 RX ADMIN — INSULIN LISPRO 5 UNITS: 100 INJECTION, SOLUTION INTRAVENOUS; SUBCUTANEOUS at 18:29

## 2024-08-28 RX ADMIN — HEPARIN SODIUM 5000 UNITS: 5000 INJECTION INTRAVENOUS; SUBCUTANEOUS at 09:08

## 2024-08-28 RX ADMIN — ATORVASTATIN CALCIUM 20 MG: 20 TABLET, FILM COATED ORAL at 20:47

## 2024-08-28 RX ADMIN — INSULIN LISPRO 4 UNITS: 100 INJECTION, SOLUTION INTRAVENOUS; SUBCUTANEOUS at 13:07

## 2024-08-28 RX ADMIN — CARVEDILOL 6.25 MG: 6.25 TABLET, FILM COATED ORAL at 20:46

## 2024-08-28 RX ADMIN — CLONIDINE HYDROCHLORIDE 0.1 MG: 0.1 TABLET ORAL at 09:07

## 2024-08-28 RX ADMIN — FENOFIBRATE 160 MG: 160 TABLET ORAL at 09:07

## 2024-08-28 RX ADMIN — SODIUM CHLORIDE, PRESERVATIVE FREE 10 ML: 5 INJECTION INTRAVENOUS at 20:55

## 2024-08-28 RX ADMIN — CLONIDINE HYDROCHLORIDE 0.1 MG: 0.1 TABLET ORAL at 20:47

## 2024-08-28 RX ADMIN — HEPARIN SODIUM 5000 UNITS: 5000 INJECTION INTRAVENOUS; SUBCUTANEOUS at 20:56

## 2024-08-28 RX ADMIN — SODIUM CHLORIDE 500 ML: 9 INJECTION, SOLUTION INTRAVENOUS at 00:13

## 2024-08-28 RX ADMIN — GABAPENTIN 100 MG: 100 CAPSULE ORAL at 20:47

## 2024-08-28 RX ADMIN — INSULIN GLARGINE 32 UNITS: 100 INJECTION, SOLUTION SUBCUTANEOUS at 20:47

## 2024-08-28 ASSESSMENT — PAIN SCALES - GENERAL
PAINLEVEL_OUTOF10: 0
PAINLEVEL_OUTOF10: 0
PAINLEVEL_OUTOF10: 3
PAINLEVEL_OUTOF10: 0

## 2024-08-28 ASSESSMENT — PAIN DESCRIPTION - LOCATION: LOCATION: LEG

## 2024-08-28 ASSESSMENT — PAIN DESCRIPTION - DESCRIPTORS: DESCRIPTORS: DULL

## 2024-08-28 ASSESSMENT — PAIN DESCRIPTION - ORIENTATION: ORIENTATION: LEFT

## 2024-08-28 NOTE — PROGRESS NOTES
Pt seen and examined briefly today during my rounds in room # 1116  No new complains  Feeling better already after IV Abx started in ER  AAOx3  Cont IV abx per A/P by admitting hospitalist  Await Urine Cx results to make further adjustments if needed          NON BILLABLE rounding

## 2024-08-28 NOTE — PLAN OF CARE
Problem: Discharge Planning  Goal: Discharge to home or other facility with appropriate resources  Outcome: Progressing  Flowsheets (Taken 8/28/2024 1402)  Discharge to home or other facility with appropriate resources:   Identify barriers to discharge with patient and caregiver   Arrange for needed discharge resources and transportation as appropriate   Identify discharge learning needs (meds, wound care, etc)     Problem: Safety - Adult  Goal: Free from fall injury  Outcome: Progressing  Flowsheets (Taken 8/28/2024 1402)  Free From Fall Injury:   Instruct family/caregiver on patient safety   Based on caregiver fall risk screen, instruct family/caregiver to ask for assistance with transferring infant if caregiver noted to have fall risk factors     Problem: Neurosensory - Adult  Goal: Achieves stable or improved neurological status  Outcome: Progressing  Flowsheets (Taken 8/28/2024 1402)  Achieves stable or improved neurological status:   Assess for and report changes in neurological status   Initiate measures to prevent increased intracranial pressure   Maintain blood pressure and fluid volume within ordered parameters to optimize cerebral perfusion and minimize risk of hemorrhage  Goal: Absence of seizures  Outcome: Progressing  Flowsheets (Taken 8/28/2024 1402)  Absence of seizures:   Monitor for seizure activity.  If seizure occurs, document type and location of movements and any associated apnea   If seizure occurs, turn head to side and suction secretions as needed  Goal: Remains free of injury related to seizures activity  Outcome: Progressing  Flowsheets (Taken 8/28/2024 1402)  Remains free of injury related to seizure activity:   Maintain airway, patient safety  and administer oxygen as ordered   Monitor patient for seizure activity, document and report duration and description of seizure to Licensed Independent Practitioner   If seizure occurs, turn patient to side and suction secretions as  needed  Goal: Achieves maximal functionality and self care  Outcome: Progressing  Flowsheets (Taken 8/28/2024 1402)  Achieves maximal functionality and self care:   Monitor swallowing and airway patency with patient fatigue and changes in neurological status   Encourage and assist patient to increase activity and self care with guidance from physical therapy/occupational therapy     Problem: Respiratory - Adult  Goal: Achieves optimal ventilation and oxygenation  Outcome: Progressing  Flowsheets (Taken 8/28/2024 1402)  Achieves optimal ventilation and oxygenation:   Assess for changes in respiratory status   Assess for changes in mentation and behavior   Position to facilitate oxygenation and minimize respiratory effort     Problem: Cardiovascular - Adult  Goal: Maintains optimal cardiac output and hemodynamic stability  Outcome: Progressing  Flowsheets (Taken 8/28/2024 1402)  Maintains optimal cardiac output and hemodynamic stability:   Monitor blood pressure and heart rate   Monitor urine output and notify Licensed Independent Practitioner for values outside of normal range   Assess for signs of decreased cardiac output   Administer fluid and/or volume expanders as ordered  Goal: Absence of cardiac dysrhythmias or at baseline  Outcome: Progressing  Flowsheets (Taken 8/28/2024 1402)  Absence of cardiac dysrhythmias or at baseline:   Monitor cardiac rate and rhythm   Assess for signs of decreased cardiac output   Administer antiarrhythmia medication and electrolyte replacement as ordered     Problem: Skin/Tissue Integrity - Adult  Goal: Skin integrity remains intact  Outcome: Progressing  Flowsheets (Taken 8/28/2024 1402)  Skin Integrity Remains Intact:   Monitor for areas of redness and/or skin breakdown   Assess vascular access sites hourly  Goal: Incisions, wounds, or drain sites healing without S/S of infection  Outcome: Progressing  Flowsheets (Taken 8/28/2024 1402)  Incisions, Wounds, or Drain Sites Healing

## 2024-08-28 NOTE — ED PROVIDER NOTES
icterus.     Extraocular Movements: Extraocular movements intact.      Pupils: Pupils are equal, round, and reactive to light.   Cardiovascular:      Rate and Rhythm: Normal rate and regular rhythm.   Pulmonary:      Effort: Pulmonary effort is normal. No respiratory distress.      Breath sounds: Normal breath sounds. No wheezing, rhonchi or rales.   Chest:      Chest wall: No tenderness.   Abdominal:      General: There is no distension.      Palpations: Abdomen is soft.      Tenderness: There is no abdominal tenderness.   Musculoskeletal:         General: Normal range of motion.      Right lower leg: No edema.      Left lower leg: No edema.   Skin:     General: Skin is warm and dry.      Capillary Refill: Capillary refill takes less than 2 seconds.   Neurological:      Mental Status: He is alert. He is confused.      Cranial Nerves: No cranial nerve deficit.      Comments: No focal motor or sensory deficits   Psychiatric:         Mood and Affect: Mood normal.         Behavior: Behavior normal.          DIAGNOSTIC RESULTS   LABS:     Recent Results (from the past 24 hour(s))   POCT Glucose    Collection Time: 08/27/24 11:20 PM   Result Value Ref Range    POC Glucose 312 (H) 65 - 117 mg/dL    Performed by: Gita TRAVIS    CBC with Auto Differential    Collection Time: 08/27/24 11:53 PM   Result Value Ref Range    WBC 9.4 4.1 - 11.1 K/uL    RBC 2.93 (L) 4.10 - 5.70 M/uL    Hemoglobin 8.7 (L) 12.1 - 17.0 g/dL    Hematocrit 26.5 (L) 36.6 - 50.3 %    MCV 90.4 80.0 - 99.0 FL    MCH 29.7 26.0 - 34.0 PG    MCHC 32.8 30.0 - 36.5 g/dL    RDW 13.9 11.5 - 14.5 %    Platelets 250 150 - 400 K/uL    MPV 10.3 8.9 - 12.9 FL    Nucleated RBCs 0.0 0  WBC    nRBC 0.00 0.00 - 0.01 K/uL    Neutrophils % 70 32 - 75 %    Lymphocytes % 18 12 - 49 %    Monocytes % 9 5 - 13 %    Eosinophils % 2 0 - 7 %    Basophils % 0 0 - 1 %    Immature Granulocytes % 1 (H) 0.0 - 0.5 %    Neutrophils Absolute 6.6 1.8 - 8.0 K/UL    Lymphocytes  no administration in time range)   cefTRIAXone (ROCEPHIN) 1,000 mg in sodium chloride 0.9 % 50 mL IVPB (mini-bag) (has no administration in time range)   sodium chloride flush 0.9 % injection 5-40 mL (has no administration in time range)   sodium chloride flush 0.9 % injection 5-40 mL (has no administration in time range)   0.9 % sodium chloride infusion (has no administration in time range)   ondansetron (ZOFRAN-ODT) disintegrating tablet 4 mg (has no administration in time range)     Or   ondansetron (ZOFRAN) injection 4 mg (has no administration in time range)   polyethylene glycol (GLYCOLAX) packet 17 g (has no administration in time range)   acetaminophen (TYLENOL) tablet 650 mg (has no administration in time range)     Or   acetaminophen (TYLENOL) suppository 650 mg (has no administration in time range)   melatonin tablet 3 mg (has no administration in time range)   enoxaparin (LOVENOX) injection 40 mg (has no administration in time range)   insulin glargine (LANTUS) injection vial 32 Units (has no administration in time range)   rx placeholder (has no administration in time range)   sodium chloride 0.9 % bolus 500 mL (0 mLs IntraVENous Stopped 8/28/24 0215)   cefTRIAXone (ROCEPHIN) 1,000 mg in sodium chloride 0.9 % 50 mL IVPB (mini-bag) (0 mg IntraVENous Stopped 8/28/24 0114)       Medical Decision Making  Amount and/or Complexity of Data Reviewed  Labs: ordered.  Radiology: ordered.    Risk  Prescription drug management.  Decision regarding hospitalization.        Patient presents to the emergency department for evaluation of altered mental status.  Per EMS family had called 911 secondary to altered mental status.  Patient blood sugar was low at 41.  Patient be given oral glucose and route.  Patient arrives to the emergency department still somewhat confused.  EMS had reported temperature of 101.  Patient denies any recent cough or cold symptoms.  Denies any chest pain or shortness of.  He denies any

## 2024-08-28 NOTE — ED NOTES
TRANSFER - OUT REPORT:    Verbal report given to LISA Benton on Sabas Diaz  being transferred to [facility] for routine progression of patient care       Report consisted of patient's Situation, Background, Assessment and   Recommendations(SBAR).     Information from the following report(s) Nurse Handoff Report, Index, ED SBAR, MAR, Neuro Assessment, and Event Log was reviewed with the receiving nurse.    Hermosa Fall Assessment:    Presents to emergency department  because of falls (Syncope, seizure, or loss of consciousness): No  Age > 70: Yes  Altered Mental Status, Intoxication with alcohol or substance confusion (Disorientation, impaired judgment, poor safety awaremess, or inability to follow instructions): No  Impaired Mobility: Ambulates or transfers with assistive devices or assistance; Unable to ambulate or transer.: No             Lines:   Peripheral IV 08/27/24 Right Antecubital (Active)   Site Assessment Clean, dry & intact 08/27/24 2347   Line Status Normal saline locked 08/27/24 2347   Line Care Connections checked and tightened 08/27/24 2347   Phlebitis Assessment No symptoms 08/27/24 2347   Infiltration Assessment 0 08/27/24 2347   Alcohol Cap Used Yes 08/27/24 2347   Dressing Status Clean, dry & intact 08/27/24 2347   Dressing Type Transparent 08/27/24 2347        Opportunity for questions and clarification was provided.      Patient transported with:  Tech

## 2024-08-28 NOTE — PROGRESS NOTES
End of Shift Note    Bedside shift change report given to LISA Landa (oncoming nurse) by Alexandra Gonzalez RN (offgoing nurse).  Report included the following information SBAR, Intake/Output, MAR, Recent Results, and Cardiac Rhythm      Shift worked:  7a-7p     Shift summary and any significant changes:     Pt is A&O x4. V/S stable. Last POC glucose 184. No sliding scale coverage required. UP with standy assist. Uses walker. Medicated x 1 for leg pain.       Concerns for physician to address:  Pending discharge for UTI     Zone phone for oncoming shift:   [phone]       Activity:  Level of Assistance: Standby assist, set-up cues, supervision of patient - no hands on    Cardiac:   Cardiac Monitoring:  yes - NSR    Access:  Current line(s): PIV     Genitourinary:        Respiratory:   O2 Device: None (Room air)    GI:  Current diet: ADULT DIET; Regular; 4 carb choices (60 gm/meal); Low Fat/Low Chol/High Fiber/SAJI    Pain Management:   Patient states pain is manageable on current regimen: YES    Skin:  David Scale Score: 22  Interventions: Wound Offloading (Prevention Methods): Pillows, Repositioning  Pressure injury: no    Patient Safety:  Fall Score: Archer Total Score: 85  Fall Risk Interventions  Nursing Judgement-Fall Risk High(Add Comments): Yes  Toilet Every 2 Hours-In Advance of Need: Yes  Hourly Visual Checks: Awake  Fall Visual Posted: Armband, Other (comment)  Room Door Open: Deferred to decrease stimulation  Alarm On: Bed  Patient Moved Closer to Nursing Station: No    Active Consults:  IP CONSULT TO HOSPITALIST  IP CONSULT TO SPIRITUAL SERVICES    Length of Stay:  Expected LOS: 3  Actual LOS: 0      Alexandra Gonzalez RN

## 2024-08-28 NOTE — PROGRESS NOTES
End of Shift Note    Bedside shift change report given to Alexandra RN and LISA Linda (oncoming nurse) by Syeda Moore RN (offgoing nurse).  Report included the following information SBAR, Kardex, MAR, Recent Results, and Cardiac Rhythm sinus rhythm    Shift worked:  9228-7720     Shift summary and any significant changes:     Patient tolerated care. Pt was hungry when he arrived on the floor. Got him a meal. VS done. Pt able to rest a little over shift.     Concerns for physician to address:       Zone phone for oncoming shift:          Activity:  Level of Assistance: Modified independent, requires aide device or extra time    Cardiac:   Cardiac Monitoring:  yes -     Access:  Current line(s): PIV     Genitourinary:        Respiratory:   O2 Device: None (Room air)    GI:  Current diet: ADULT DIET; Regular; 4 carb choices (60 gm/meal); Low Fat/Low Chol/High Fiber/SAJI    Pain Management:   Patient states pain is manageable on current regimen: Yes    Skin:  David Scale Score: 19  Interventions: Wound Offloading (Prevention Methods): Bed, pressure reduction mattress, Pillows, Repositioning, Turning  Pressure injury: no    Patient Safety:  Fall Score:    Fall Risk Interventions  Nursing Judgement-Fall Risk High(Add Comments): Yes  Toilet Every 2 Hours-In Advance of Need: No (Comment) (urinal)  Hourly Visual Checks: In bed, Awake  Fall Visual Posted: Fall sign posted  Room Door Open: Deferred to promote rest  Alarm On: Bed    Active Consults:  IP CONSULT TO HOSPITALIST    Length of Stay:  Expected LOS: 3  Actual LOS: 0      Syeda Moore RN

## 2024-08-28 NOTE — H&P
Hospitalist Admission Note    NAME:  Sabas Diaz   :  1945   MRN:  [id number]     Date/Time:  2024 3:47 AM    Patient PCP: Vera Zaragoza MD    ______________________________________________________________________  Given the patient's current clinical presentation, I have a high level of concern for decompensation if discharged from the emergency department.  Complex decision making was performed, which includes reviewing the patient's available past medical records, laboratory results, and x-ray films.       My assessment of this patient's clinical condition and my plan of care is as follows.    Assessment / Plan:    Active Problems:  Toxic encephalopathy  Complicated UTI, recurrent  History of prostate cancer status post resection  CKD 4, at baseline  Essential hypertension  Hyperlipidemia  Chronic normocytic anemia-suspect anemia of chronic kidney disease  Insulin dependent and diabetes mellitus  Diabetic neuropathy    Plan:  Toxic encephalopathy  Complicated UTI, recurrent  History of prostate cancer status post resection  Admit to telemetry monitoring  Continue empiric ceftriaxone  -Follow urine cultures and adjust antibiotics as indicated  Monitor for signs of delirium  Delirium precautions ordered    CKD 4, at baseline  Trend renal function  Renally dose medications and avoid nephrotoxic agents    Essential hypertension  Hyperlipidemia  Continue PTA atorvastatin, Coreg, clonidine, fenofibrate    Chronic normocytic anemia-suspect anemia of chronic kidney disease  Trend hemoglobin    Insulin dependent and diabetes mellitus  Diabetic neuropathy  Decrease PTA glargine to 32 units nightly  Corrective coverage insulin  Accu-Cheks  Diabetic diet  Schedule 5 units lispro with meals  Continue PTA gabapentin nightly    Medical Decision Making:   I personally reviewed labs: Yes, as listed below  I personally reviewed imaging: CT head, CXR  Toxic drug monitoring: None  Discussed case with: ED provider.  WBC 9.4 4.1 - 11.1 K/uL    RBC 2.93 (L) 4.10 - 5.70 M/uL    Hemoglobin 8.7 (L) 12.1 - 17.0 g/dL    Hematocrit 26.5 (L) 36.6 - 50.3 %    MCV 90.4 80.0 - 99.0 FL    MCH 29.7 26.0 - 34.0 PG    MCHC 32.8 30.0 - 36.5 g/dL    RDW 13.9 11.5 - 14.5 %    Platelets 250 150 - 400 K/uL    MPV 10.3 8.9 - 12.9 FL    Nucleated RBCs 0.0 0  WBC    nRBC 0.00 0.00 - 0.01 K/uL    Neutrophils % 70 32 - 75 %    Lymphocytes % 18 12 - 49 %    Monocytes % 9 5 - 13 %    Eosinophils % 2 0 - 7 %    Basophils % 0 0 - 1 %    Immature Granulocytes % 1 (H) 0.0 - 0.5 %    Neutrophils Absolute 6.6 1.8 - 8.0 K/UL    Lymphocytes Absolute 1.7 0.8 - 3.5 K/UL    Monocytes Absolute 0.9 0.0 - 1.0 K/UL    Eosinophils Absolute 0.2 0.0 - 0.4 K/UL    Basophils Absolute 0.0 0.0 - 0.1 K/UL    Immature Granulocytes Absolute 0.1 (H) 0.00 - 0.04 K/UL    Differential Type AUTOMATED     Comprehensive Metabolic Panel    Collection Time: 08/27/24 11:53 PM   Result Value Ref Range    Sodium 137 136 - 145 mmol/L    Potassium 4.6 3.5 - 5.1 mmol/L    Chloride 102 97 - 108 mmol/L    CO2 31 21 - 32 mmol/L    Anion Gap 4 (L) 5 - 15 mmol/L    Glucose 269 (H) 65 - 100 mg/dL    BUN 44 (H) 6 - 20 MG/DL    Creatinine 2.56 (H) 0.70 - 1.30 MG/DL    BUN/Creatinine Ratio 17 12 - 20      Est, Glom Filt Rate 25 (L) >60 ml/min/1.73m2    Calcium 10.1 8.5 - 10.1 MG/DL    Total Bilirubin 0.4 0.2 - 1.0 MG/DL    ALT 30 12 - 78 U/L    AST 20 15 - 37 U/L    Alk Phosphatase 44 (L) 45 - 117 U/L    Total Protein 7.5 6.4 - 8.2 g/dL    Albumin 3.3 (L) 3.5 - 5.0 g/dL    Globulin 4.2 (H) 2.0 - 4.0 g/dL    Albumin/Globulin Ratio 0.8 (L) 1.1 - 2.2     Urinalysis with Reflex to Culture    Collection Time: 08/27/24 11:53 PM    Specimen: Urine   Result Value Ref Range    Color, UA YELLOW/STRAW      Appearance CLEAR CLEAR      Specific Gravity, UA 1.010      pH, Urine 6.5 5.0 - 8.0      Protein, UA 30 (A) NEG mg/dL    Glucose, Ur 250 (A) NEG mg/dL    Ketones, Urine Negative NEG mg/dL    Bilirubin,

## 2024-08-29 LAB
ANION GAP SERPL CALC-SCNC: 2 MMOL/L (ref 5–15)
BASOPHILS # BLD: 0 K/UL (ref 0–0.1)
BASOPHILS NFR BLD: 0 % (ref 0–1)
BUN SERPL-MCNC: 42 MG/DL (ref 6–20)
BUN/CREAT SERPL: 17 (ref 12–20)
CALCIUM SERPL-MCNC: 9.7 MG/DL (ref 8.5–10.1)
CHLORIDE SERPL-SCNC: 104 MMOL/L (ref 97–108)
CO2 SERPL-SCNC: 30 MMOL/L (ref 21–32)
CREAT SERPL-MCNC: 2.49 MG/DL (ref 0.7–1.3)
DIFFERENTIAL METHOD BLD: ABNORMAL
EOSINOPHIL # BLD: 0.2 K/UL (ref 0–0.4)
EOSINOPHIL NFR BLD: 2 % (ref 0–7)
ERYTHROCYTE [DISTWIDTH] IN BLOOD BY AUTOMATED COUNT: 13.8 % (ref 11.5–14.5)
GLUCOSE BLD STRIP.AUTO-MCNC: 134 MG/DL (ref 65–117)
GLUCOSE BLD STRIP.AUTO-MCNC: 221 MG/DL (ref 65–117)
GLUCOSE BLD STRIP.AUTO-MCNC: 223 MG/DL (ref 65–117)
GLUCOSE BLD STRIP.AUTO-MCNC: 225 MG/DL (ref 65–117)
GLUCOSE SERPL-MCNC: 151 MG/DL (ref 65–100)
HCT VFR BLD AUTO: 26 % (ref 36.6–50.3)
HGB BLD-MCNC: 8.4 G/DL (ref 12.1–17)
IMM GRANULOCYTES # BLD AUTO: 0 K/UL (ref 0–0.04)
IMM GRANULOCYTES NFR BLD AUTO: 0 % (ref 0–0.5)
LYMPHOCYTES # BLD: 2.9 K/UL (ref 0.8–3.5)
LYMPHOCYTES NFR BLD: 32 % (ref 12–49)
MCH RBC QN AUTO: 29.5 PG (ref 26–34)
MCHC RBC AUTO-ENTMCNC: 32.3 G/DL (ref 30–36.5)
MCV RBC AUTO: 91.2 FL (ref 80–99)
MONOCYTES # BLD: 1.1 K/UL (ref 0–1)
MONOCYTES NFR BLD: 12 % (ref 5–13)
NEUTS SEG # BLD: 4.8 K/UL (ref 1.8–8)
NEUTS SEG NFR BLD: 54 % (ref 32–75)
NRBC # BLD: 0 K/UL (ref 0–0.01)
NRBC BLD-RTO: 0 PER 100 WBC
PLATELET # BLD AUTO: 225 K/UL (ref 150–400)
PMV BLD AUTO: 10.5 FL (ref 8.9–12.9)
POTASSIUM SERPL-SCNC: 4.2 MMOL/L (ref 3.5–5.1)
RBC # BLD AUTO: 2.85 M/UL (ref 4.1–5.7)
SERVICE CMNT-IMP: ABNORMAL
SODIUM SERPL-SCNC: 136 MMOL/L (ref 136–145)
WBC # BLD AUTO: 9.1 K/UL (ref 4.1–11.1)

## 2024-08-29 PROCEDURE — 36415 COLL VENOUS BLD VENIPUNCTURE: CPT

## 2024-08-29 PROCEDURE — 80048 BASIC METABOLIC PNL TOTAL CA: CPT

## 2024-08-29 PROCEDURE — 2580000003 HC RX 258: Performed by: STUDENT IN AN ORGANIZED HEALTH CARE EDUCATION/TRAINING PROGRAM

## 2024-08-29 PROCEDURE — 6360000002 HC RX W HCPCS: Performed by: STUDENT IN AN ORGANIZED HEALTH CARE EDUCATION/TRAINING PROGRAM

## 2024-08-29 PROCEDURE — 85025 COMPLETE CBC W/AUTO DIFF WBC: CPT

## 2024-08-29 PROCEDURE — 82962 GLUCOSE BLOOD TEST: CPT

## 2024-08-29 PROCEDURE — 1100000003 HC PRIVATE W/ TELEMETRY

## 2024-08-29 PROCEDURE — 6370000000 HC RX 637 (ALT 250 FOR IP): Performed by: STUDENT IN AN ORGANIZED HEALTH CARE EDUCATION/TRAINING PROGRAM

## 2024-08-29 RX ADMIN — MELATONIN 3 MG: at 21:34

## 2024-08-29 RX ADMIN — CLONIDINE HYDROCHLORIDE 0.1 MG: 0.1 TABLET ORAL at 10:02

## 2024-08-29 RX ADMIN — SODIUM CHLORIDE, PRESERVATIVE FREE 10 ML: 5 INJECTION INTRAVENOUS at 21:45

## 2024-08-29 RX ADMIN — SODIUM CHLORIDE, PRESERVATIVE FREE 10 ML: 5 INJECTION INTRAVENOUS at 10:01

## 2024-08-29 RX ADMIN — INSULIN LISPRO 2 UNITS: 100 INJECTION, SOLUTION INTRAVENOUS; SUBCUTANEOUS at 13:12

## 2024-08-29 RX ADMIN — INSULIN GLARGINE 32 UNITS: 100 INJECTION, SOLUTION SUBCUTANEOUS at 21:33

## 2024-08-29 RX ADMIN — ATORVASTATIN CALCIUM 20 MG: 20 TABLET, FILM COATED ORAL at 21:34

## 2024-08-29 RX ADMIN — CEFTRIAXONE SODIUM 1000 MG: 1 INJECTION, POWDER, FOR SOLUTION INTRAMUSCULAR; INTRAVENOUS at 00:58

## 2024-08-29 RX ADMIN — INSULIN LISPRO 2 UNITS: 100 INJECTION, SOLUTION INTRAVENOUS; SUBCUTANEOUS at 18:02

## 2024-08-29 RX ADMIN — INSULIN LISPRO 5 UNITS: 100 INJECTION, SOLUTION INTRAVENOUS; SUBCUTANEOUS at 18:02

## 2024-08-29 RX ADMIN — HEPARIN SODIUM 5000 UNITS: 5000 INJECTION INTRAVENOUS; SUBCUTANEOUS at 10:00

## 2024-08-29 RX ADMIN — CARVEDILOL 6.25 MG: 6.25 TABLET, FILM COATED ORAL at 21:34

## 2024-08-29 RX ADMIN — GABAPENTIN 100 MG: 100 CAPSULE ORAL at 21:34

## 2024-08-29 RX ADMIN — SODIUM CHLORIDE: 9 INJECTION, SOLUTION INTRAVENOUS at 00:58

## 2024-08-29 RX ADMIN — HEPARIN SODIUM 5000 UNITS: 5000 INJECTION INTRAVENOUS; SUBCUTANEOUS at 21:34

## 2024-08-29 RX ADMIN — FENOFIBRATE 160 MG: 160 TABLET ORAL at 10:00

## 2024-08-29 RX ADMIN — CLONIDINE HYDROCHLORIDE 0.1 MG: 0.1 TABLET ORAL at 21:34

## 2024-08-29 RX ADMIN — CARVEDILOL 6.25 MG: 6.25 TABLET, FILM COATED ORAL at 10:02

## 2024-08-29 RX ADMIN — INSULIN LISPRO 5 UNITS: 100 INJECTION, SOLUTION INTRAVENOUS; SUBCUTANEOUS at 13:11

## 2024-08-29 RX ADMIN — ACETAMINOPHEN 650 MG: 325 TABLET ORAL at 21:34

## 2024-08-29 RX ADMIN — INSULIN LISPRO 5 UNITS: 100 INJECTION, SOLUTION INTRAVENOUS; SUBCUTANEOUS at 10:00

## 2024-08-29 ASSESSMENT — PAIN SCALES - GENERAL
PAINLEVEL_OUTOF10: 0
PAINLEVEL_OUTOF10: 2

## 2024-08-29 ASSESSMENT — PAIN DESCRIPTION - LOCATION: LOCATION: GENERALIZED

## 2024-08-29 ASSESSMENT — PAIN DESCRIPTION - DESCRIPTORS: DESCRIPTORS: ACHING

## 2024-08-29 NOTE — PROGRESS NOTES
Comprehensive Nutrition Assessment    Type and Reason for Visit:  Positive Nutrition Screen, Initial    Nutrition Recommendations/Plan:   Continue current diet  Encourage PO intakes, honor preferences as able, provide assistance PRN  Monitor and record PO intakes and Bms in I/Os     Malnutrition Assessment:  Malnutrition Status:  Mild malnutrition (08/29/24 1244)    Context:  Acute Illness     Findings of the 6 clinical characteristics of malnutrition:  Energy Intake:  Mild decrease in energy intake (Comment)  Weight Loss:  Greater than 7.5% over 3 months     Body Fat Loss:  Unable to assess     Muscle Mass Loss:  Unable to assess    Fluid Accumulation:  No significant fluid accumulation     Strength:  Not Performed    Nutrition Assessment:    Admitted for complicated UTI. PMHx includes DM, HTN, prostate ca. Screened for MST2, pt reported weight loss, poor appetite/intake. Noted wt loss -8.3% x 3 months per EMR wt hx. Intakes improved during admission. Plan to continue diet, encourage PO.    Nutrition Related Findings:    Labs: Na 136, K 4.2, BUN 42, Creat 2.49, Gluc 223. Meds: insulin glargine, insulin lispro, 0.9%NaCl. No edema. BM PTA. Wound Type: None       Current Nutrition Intake & Therapies:    ADULT DIET; Regular; 4 carb choices (60 gm/meal); Low Fat/Low Chol/High Fiber/SAJI    Anthropometric Measures:  Height: 177.8 cm (5' 10\")  Ideal Body Weight (IBW): 166 lbs (75 kg)    Current Body Weight: 95.5 kg (210 lb 8.6 oz), 126.8 % IBW. Weight Source: Bed Scale  Current BMI (kg/m2): 30.2  BMI Categories: Obese Class 1 (BMI 30.0-34.9)    Estimated Daily Nutrient Needs:  Energy Requirements Based On: Kcal/kg  Weight Used for Energy Requirements: Adjusted  Energy (kcal/day): 2013kcal (25kcal/kg)  Weight Used for Protein Requirements: Adjusted  Protein (g/day): 81g (1g/kg)  Method Used for Fluid Requirements: 1 ml/kcal  Fluid (ml/day): 2013mL    Nutrition Diagnosis:   No nutrition diagnosis at this time

## 2024-08-29 NOTE — PROGRESS NOTES
End of Shift Note    Bedside shift change report given to LISA Morris (oncoming nurse) by Syeda Moore RN (offgoing nurse).  Report included the following information SBAR, Kardex, Intake/Output, MAR, and Recent Results    Shift worked:  7p-7a     Shift summary and any significant changes:     Patient tolerated care. Daughter at bedside throughout shift. Scheduled meds given except sliding scale insulin. Pt up with the walker. Labs drawn. Caring rounds provided. Labs drawn.     Concerns for physician to address:       Zone phone for oncoming shift:   [phone]       Activity:  Level of Assistance: Standby assist, set-up cues, supervision of patient - no hands on    Cardiac:   Cardiac Monitoring:  no    Access:  Current line(s): PIV     Genitourinary:        Respiratory:   O2 Device: None (Room air)    GI:  Current diet: ADULT DIET; Regular; 4 carb choices (60 gm/meal); Low Fat/Low Chol/High Fiber/SAJI    Pain Management:   Patient states pain is manageable on current regimen: YES    Skin:  David Scale Score: 20  Interventions: Wound Offloading (Prevention Methods): Pillows, Repositioning  Pressure injury: no    Patient Safety:  Fall Score: Archer Total Score: 85  Fall Risk Interventions  Nursing Judgement-Fall Risk High(Add Comments): Yes  Toilet Every 2 Hours-In Advance of Need: Yes  Hourly Visual Checks: Awake  Fall Visual Posted: Armband, Other (comment)  Room Door Open: Deferred to decrease stimulation  Alarm On: Bed  Patient Moved Closer to Nursing Station: No    Active Consults:  IP CONSULT TO HOSPITALIST  IP CONSULT TO SPIRITUAL SERVICES    Length of Stay:  Expected LOS: 3  Actual LOS: 1      Syeda Moore RN

## 2024-08-29 NOTE — CARE COORDINATION
Care Management Initial Assessment       RUR: 22%  Readmission? No  1st IM letter given? Yes - 8/28/24  1st  letter given: No      CM introduce self, explain role and confirmed demographics with pt and pt's spouse, America Diaz.    Pt lives with his spouse in an one story home with 5-6 steps to enter.    Pt is currently open to [facility]. Referral was sent to [facility].    No hx of SNF or inpatient rehab.    Pt uses the [facility] in [place].    At the time of d/c pt's will need transportation.    CM will follow and assist with d/c planning.       08/29/24 0904   Service Assessment   Patient Orientation Alert and Oriented   Cognition Alert   History Provided By Patient;Spouse   Primary Caregiver Spouse   Support Systems Spouse/Significant Other;Children;Home Care Staff   Patient's Healthcare Decision Maker is: Legal Next of Kin  (pt's spouse-America Diaz)   PCP Verified by CM Yes   Last Visit to PCP Within last 6 months   Prior Functional Level Assistance with the following:;Bathing;Cooking;Housework;Shopping   Current Functional Level Assistance with the following:;Bathing;Cooking;Housework;Shopping   Can patient return to prior living arrangement Yes   Family able to assist with home care needs: Yes   Would you like for me to discuss the discharge plan with any other family members/significant others, and if so, who? Yes  (Pt's spouse-America Diaz)   Financial Resources Medicare;Other (Comment)  ([employer])   Community Resources ECF/Home Care   Social/Functional History   Lives With Spouse   Type of Home House   Home Equipment Walker - Rolling;Wheelchair - Manual;Grab bars  (Shower chair)   Active  Yes   Discharge Planning   Type of Residence House   Current Services Prior To Admission Home Care   Potential Assistance Needed Home Care   Type of Home Care Services OT;PT;Nursing Services   Patient expects to be discharged to: House     Advance Care Planning     General Advance Care

## 2024-08-29 NOTE — PROGRESS NOTES
Spiritual Health Assessment/Progress Note  [facility]    Initial Encounter,  ,  ,      Name: Sabas Diaz MRN: [id number]    Age: 79 y.o.     Sex: male   Language: English   Church: [employer]   Complicated UTI (urinary tract infection)     Date: 8/29/2024            Total Time Calculated: 34 min              Spiritual Assessment began in [facility] 1 MULTI-SPECIALTY TELEMETRY        Referral/Consult From: Nurse   Encounter Overview/Reason: Initial Encounter  Service Provided For: Patient    Evelyne, Belief, Meaning:   Patient identifies as spiritual Strong Sikhism evelyne  Family/Friends No family/friends present       Importance and Influence:  Patient has spiritual/personal beliefs that influence decisions regarding their health  Family/Friends Other: Wife (America) is a wonderful support to her . (Brought him to [facility] for care.)     Community:  Patient feels well-supported. Support system includes: Spouse/Partner  Family/Friends Other: Children are grown. Grew up in a large, supportive family.     Assessment and Plan of Care:     Patient Interventions include: Facilitated expression of thoughts and feelings, Affirmed coping skills/support systems, and Engaged in life review and/or legacy. Stepped into room and spoke with Mr. Diaz regarding his stay at [facility]. He is feeling better and looking forward to returning home tomorrow. He is feeling better. His wife is wonderfully supportive and encouraging.   Family/Friends Interventions include: Other: Wife America    Patient Plan of Care: No spiritual needs identified for follow-up  Family/Friends Plan of Care: No future visits per patient/family request    Electronically signed by Chaplain KVNG on 8/29/2024 at 11:36 AM

## 2024-08-29 NOTE — PROGRESS NOTES
Attempted to schedule hospital follow up PCP appointment. Office  will contact the patient with appointment information per office protocol. [facility] placed [facility] information AVS for patient resource. Pending patient discharge. Tana Chao, Care Management Assistant

## 2024-08-30 VITALS
OXYGEN SATURATION: 99 % | HEIGHT: 70 IN | SYSTOLIC BLOOD PRESSURE: 156 MMHG | HEART RATE: 66 BPM | DIASTOLIC BLOOD PRESSURE: 72 MMHG | BODY MASS INDEX: 30.14 KG/M2 | RESPIRATION RATE: 18 BRPM | TEMPERATURE: 97.5 F | WEIGHT: 210.5 LBS

## 2024-08-30 LAB
BACTERIA SPEC CULT: ABNORMAL
CC UR VC: ABNORMAL
GLUCOSE BLD STRIP.AUTO-MCNC: 134 MG/DL (ref 65–117)
GLUCOSE BLD STRIP.AUTO-MCNC: 251 MG/DL (ref 65–117)
SERVICE CMNT-IMP: ABNORMAL

## 2024-08-30 PROCEDURE — 6370000000 HC RX 637 (ALT 250 FOR IP): Performed by: STUDENT IN AN ORGANIZED HEALTH CARE EDUCATION/TRAINING PROGRAM

## 2024-08-30 PROCEDURE — 2580000003 HC RX 258: Performed by: STUDENT IN AN ORGANIZED HEALTH CARE EDUCATION/TRAINING PROGRAM

## 2024-08-30 PROCEDURE — 6360000002 HC RX W HCPCS: Performed by: STUDENT IN AN ORGANIZED HEALTH CARE EDUCATION/TRAINING PROGRAM

## 2024-08-30 PROCEDURE — 82962 GLUCOSE BLOOD TEST: CPT

## 2024-08-30 RX ORDER — CEPHALEXIN 500 MG/1
500 CAPSULE ORAL 4 TIMES DAILY
Qty: 28 CAPSULE | Refills: 0 | Status: SHIPPED | OUTPATIENT
Start: 2024-08-30 | End: 2024-09-06

## 2024-08-30 RX ADMIN — FENOFIBRATE 160 MG: 160 TABLET ORAL at 08:39

## 2024-08-30 RX ADMIN — CARVEDILOL 6.25 MG: 6.25 TABLET, FILM COATED ORAL at 08:38

## 2024-08-30 RX ADMIN — SODIUM CHLORIDE, PRESERVATIVE FREE 10 ML: 5 INJECTION INTRAVENOUS at 08:42

## 2024-08-30 RX ADMIN — CEFTRIAXONE SODIUM 1000 MG: 1 INJECTION, POWDER, FOR SOLUTION INTRAMUSCULAR; INTRAVENOUS at 01:54

## 2024-08-30 RX ADMIN — HEPARIN SODIUM 5000 UNITS: 5000 INJECTION INTRAVENOUS; SUBCUTANEOUS at 08:39

## 2024-08-30 RX ADMIN — CLONIDINE HYDROCHLORIDE 0.1 MG: 0.1 TABLET ORAL at 08:39

## 2024-08-30 NOTE — PLAN OF CARE
Problem: Discharge Planning  Goal: Discharge to home or other facility with appropriate resources  Outcome: Adequate for Discharge     Problem: Safety - Adult  Goal: Free from fall injury  Outcome: Adequate for Discharge     Problem: Neurosensory - Adult  Goal: Achieves stable or improved neurological status  Outcome: Adequate for Discharge  Flowsheets (Taken 8/29/2024 2255 by Erick Fisher RN)  Achieves stable or improved neurological status: Assess for and report changes in neurological status  Goal: Absence of seizures  Outcome: Adequate for Discharge  Flowsheets (Taken 8/29/2024 2255 by Erick Fisher RN)  Absence of seizures: Monitor for seizure activity.  If seizure occurs, document type and location of movements and any associated apnea  Goal: Remains free of injury related to seizures activity  Outcome: Adequate for Discharge  Flowsheets (Taken 8/29/2024 2255 by Erick Fisher RN)  Remains free of injury related to seizure activity: Maintain airway, patient safety  and administer oxygen as ordered  Goal: Achieves maximal functionality and self care  Outcome: Adequate for Discharge  Flowsheets (Taken 8/29/2024 2255 by Erick Fisher RN)  Achieves maximal functionality and self care: Encourage and assist patient to increase activity and self care with guidance from physical therapy/occupational therapy     Problem: Neurosensory - Adult  Goal: Absence of seizures  Outcome: Adequate for Discharge  Flowsheets (Taken 8/29/2024 2255 by Erick Fisher RN)  Absence of seizures: Monitor for seizure activity.  If seizure occurs, document type and location of movements and any associated apnea     Problem: Neurosensory - Adult  Goal: Absence of seizures  Outcome: Adequate for Discharge  Flowsheets (Taken 8/29/2024 2255 by Erick Fisher RN)  Absence of seizures: Monitor for seizure activity.  If seizure occurs, document type and location of movements and any associated apnea

## 2024-08-30 NOTE — PROGRESS NOTES
Patient determined to be stable for discharge by attending provider. I have reviewed the discharge instructions and follow-up appointments with the pt. They verbalized understanding and all questions were answered to their satisfaction. No complaints or further questions were expressed.       New medications: Appropriate educational materials and medication side effect teaching were provided.       PIV were removed prior to discharge.     All personal items collected during admission were returned to the patient prior to discharge.    Yaritza Adamson RN

## 2024-08-30 NOTE — CARE COORDINATION
Pt is clear from CM standpoint for d/c.    [employer] will be transporting pt at 11 am.    Transition of Care Plan:    RUR: 22%  Prior Level of Functioning: Independent to Assistance   Disposition: Home with spouse and home health [facility]  If SNF or IPR: Date FOC offered:   Date FOC received:   Accepting facility:   Date authorization started with reference number:   Date authorization received and expires:   Follow up appointments: PCP   DME needed: No DME needed   Transportation at discharge: [employer]   IM/IMM Medicare/ letter given: 8/28/24  Is patient a  and connected with VA? No   If yes, was [place] transfer form completed and VA notified? No  Caregiver Contact: pt's spouse-America Diaz  Discharge Caregiver contacted prior to discharge? Pt and pt's spouse was contact   Care Conference needed? No  Barriers to discharge: None          08/30/24 0854   Services At/After Discharge   Transition of Care Consult (CM Consult) Home Health   Services At/After Discharge Home Health    Resource Information Provided? No   Mode of Transport at Discharge Self   Confirm Follow Up Transport Self   Condition of Participation: Discharge Planning   The Plan for Transition of Care is related to the following treatment goals: Goal is to return home wtih spouse, follow up appointments and home health-[facility]   The Patient and/or Patient Representative was provided with a Choice of Provider? Patient;Patient Representative   Name of the Patient Representative who was provided with the Choice of Provider and agrees with the Discharge Plan?  Pt's spouse-America Diaz   The Patient and/Or Patient Representative agree with the Discharge Plan? Yes   Freedom of Choice list was provided with basic dialogue that supports the patient's individualized plan of care/goals, treatment preferences, and shares the quality data associated with the providers?  Yes     Kelli Paulino

## 2024-08-30 NOTE — DISCHARGE SUMMARY
Discharge Summary    Name: Sabas Diaz  [id number]  YOB: 1945 (Age: 79 y.o.)   Date of Admission: 8/27/2024  Date of Discharge: 8/30/2024  Attending Physician: Akua Aguiar MD    Discharge Diagnosis:   Toxic encephalopathy POA- resolved  Ecoli Complicated UTI, recurrent POA- s/p IV Rocephin x 3 days , Changed to PO Keflex on discharge x 7 days more  History of prostate cancer status post resection  CKD 4, at baseline  Essential hypertension  Hyperlipidemia  Chronic normocytic anemia-suspect anemia of chronic kidney disease  Insulin dependent and diabetes mellitus  Diabetic neuropathy  Full code    Consultations:  IP CONSULT TO HOSPITALIST  IP CONSULT TO SPIRITUAL SERVICES  IP CONSULT TO CASE MANAGEMENT      Brief Admission History/Reason for Admission Per [facility], DO:   \"79 y.o.  male with PMHx as listed below presenting to the emergency department with complaints of altered mentation and confusion as well as hypoglycemia with EMS status post dextrose administration with resolution.  Patient now with clear mentation, but disoriented to preceding events and unable to provide much history.  Did have recent admission from 7/19-7/22 for similar presentation at that time with concerns for UTI versus pneumonia-unable to find urine culture results)?) treated with ceftriaxone and transition to Keflex and discharge.  ROS otherwise negative.  Denies tobacco, alcohol, illicit drugs.     In the ED, patient with elevated temperature of 99.8 °F, hemodynamically stable (hypertensive 150s/50s), saturating upper 90s on room air.  CT head negative for acute process.  CXR negative for acute process.  Labs demonstrate: UA reflex to culture, WBC 9.4, hemoglobin 8.7-MCV 90.4 (baseline), platelets 250, point-of-care glucose on arrival 312, sodium 137, potassium 4.6, BUN 44, creatinine 2.56 (baseline), LFTs grossly unremarkable.  Patient given ceftriaxone and 500 cc bolus  by ED provider.     We were asked to admit for work up and evaluation of the above problems. \"    Brief Hospital Course by Main Problems:   Toxic encephalopathy POA- resolved  Ecoli Complicated UTI, recurrent POA  History of prostate cancer status post resection     DC telemetry monitoring today  Continue empiric ceftriaxone  -Follow urine cultures and adjust antibiotics as indicated  Monitor for signs of delirium  Delirium precautions ordered     CKD 4, at baseline  Trend renal function  Renally dose medications and avoid nephrotoxic agents     Essential hypertension  Hyperlipidemia  Continue PTA atorvastatin, Coreg, clonidine, fenofibrate     Chronic normocytic anemia-suspect anemia of chronic kidney disease  Trend hemoglobin     Insulin dependent and diabetes mellitus  Diabetic neuropathy  Decrease PTA glargine to 32 units nightly  Corrective coverage insulin  Accu-Cheks  Diabetic diet  Schedule 5 units lispro with meals  Continue PTA gabapentin nightly    Discharge Exam:  Patient seen and examined by me on discharge day.  Pertinent Findings:  Patient Vitals for the past 24 hrs:   BP Temp Temp src Pulse Resp SpO2 Height   08/30/24 0838 (!) 156/72 -- -- 66 -- -- --   08/30/24 0752 (!) 156/72 97.5 °F (36.4 °C) -- 58 -- 99 % --   08/29/24 2134 (!) 171/71 98.8 °F (37.1 °C) Oral 66 18 98 % --   08/29/24 1245 -- -- -- -- -- -- 1.778 m (5' 10\")       Gen:    Not in distress  Chest: Clear lungs  CVS:   Regular rhythm.  No edema  Abd:  Soft, not distended, not tender  Neuro: awake, moving all exts    Discharge/Recent Laboratory Results:  Recent Labs     08/29/24  0143      K 4.2      CO2 30   BUN 42*   CREATININE 2.49*   GLUCOSE 151*   CALCIUM 9.7     Recent Labs     08/29/24  0143   HGB 8.4*   HCT 26.0*   WBC 9.1          Discharge Medications:     Medication List        START taking these medications      cephALEXin 500 MG capsule  Commonly known as: KEFLEX  Take 1 capsule by mouth 4 times daily for 7

## 2024-08-30 NOTE — DISCHARGE INSTRUCTIONS
HOSPITALIST DISCHARGE INSTRUCTIONS    NAME: Sabas Diaz   :  1945   MRN:  [id number]     Date/Time:  2024 8:49 AM    ADMIT DATE: 2024     DISCHARGE DATE: 2024     DISCHARGE DIAGNOSIS:  Toxic encephalopathy POA- resolved  Ecoli Complicated UTI, recurrent POA- s/p IV Rocephin x 3 days , Changed to PO Keflex on discharge x 7 days more  History of prostate cancer status post resection  CKD 4, at baseline  Essential hypertension  Hyperlipidemia  Chronic normocytic anemia-suspect anemia of chronic kidney disease  Insulin dependent and diabetes mellitus  Diabetic neuropathy  Full code    MEDICATIONS:  As per medication reconciliation  list  It is important that you take the medication exactly as they are prescribed.   Keep your medication in the bottles provided by the pharmacist and keep a list of the medication names, dosages, and times to be taken in your wallet.   Do not take other medications without consulting your doctor.     Pain Management: per above medications    What to do at Home    Recommended diet:  cardiac diet, diabetic diet, and low fat, low cholesterol diet    Recommended activity: activity as tolerated    If you have questions regarding the hospital related prescriptions or hospital related issues please call at .    If you experience any of the following symptoms then please call your primary care physician or return to the emergency room if you cannot get hold of your doctor:  Fever, chills, nausea, vomiting, diarrhea, change in mentation, falling, bleeding, shortness of breath,     Follow Up:  PCP  you are to call and set up an appointment to see them in 7-10 days.        Information obtained by :  I understand that if any problems occur once I am at home I am to contact my physician.    I understand and acknowledge receipt of the instructions indicated above.                                                                                                                                            Physician's or R.N.'s Signature                                                                  Date/Time                                                                                                                                              Patient or Representative Signature                                                          Date/Time

## 2024-11-24 RX ORDER — GLIPIZIDE 10 MG/1
TABLET ORAL
Qty: 30 TABLET | Refills: 0 | Status: SHIPPED | OUTPATIENT
Start: 2024-11-24

## 2024-12-04 RX ORDER — INSULIN GLARGINE 300 U/ML
INJECTION, SOLUTION SUBCUTANEOUS
Qty: 12 ML | Refills: 0 | Status: SHIPPED | OUTPATIENT
Start: 2024-12-04

## 2024-12-20 RX ORDER — GLIPIZIDE 10 MG/1
TABLET ORAL
Qty: 30 TABLET | Refills: 3 | Status: SHIPPED | OUTPATIENT
Start: 2024-12-20

## 2025-01-08 ENCOUNTER — OFFICE VISIT (OUTPATIENT)
Age: 80
End: 2025-01-08
Payer: MEDICARE

## 2025-01-08 VITALS — HEIGHT: 70 IN | BODY MASS INDEX: 30.2 KG/M2

## 2025-01-08 DIAGNOSIS — E78.2 MIXED HYPERLIPIDEMIA: ICD-10-CM

## 2025-01-08 DIAGNOSIS — Z79.4 TYPE 2 DIABETES MELLITUS WITH MICROALBUMINURIA, WITH LONG-TERM CURRENT USE OF INSULIN (HCC): ICD-10-CM

## 2025-01-08 DIAGNOSIS — R80.9 TYPE 2 DIABETES MELLITUS WITH MICROALBUMINURIA, WITH LONG-TERM CURRENT USE OF INSULIN (HCC): ICD-10-CM

## 2025-01-08 DIAGNOSIS — E11.65 TYPE 2 DIABETES MELLITUS WITH HYPERGLYCEMIA, WITH LONG-TERM CURRENT USE OF INSULIN (HCC): Primary | ICD-10-CM

## 2025-01-08 DIAGNOSIS — Z79.4 TYPE 2 DIABETES MELLITUS WITH STAGE 4 CHRONIC KIDNEY DISEASE, WITH LONG-TERM CURRENT USE OF INSULIN (HCC): ICD-10-CM

## 2025-01-08 DIAGNOSIS — N18.4 TYPE 2 DIABETES MELLITUS WITH STAGE 4 CHRONIC KIDNEY DISEASE, WITH LONG-TERM CURRENT USE OF INSULIN (HCC): ICD-10-CM

## 2025-01-08 DIAGNOSIS — Z79.4 TYPE 2 DIABETES MELLITUS WITH HYPERGLYCEMIA, WITH LONG-TERM CURRENT USE OF INSULIN (HCC): Primary | ICD-10-CM

## 2025-01-08 DIAGNOSIS — I10 ESSENTIAL (PRIMARY) HYPERTENSION: ICD-10-CM

## 2025-01-08 DIAGNOSIS — E11.29 TYPE 2 DIABETES MELLITUS WITH MICROALBUMINURIA, WITH LONG-TERM CURRENT USE OF INSULIN (HCC): ICD-10-CM

## 2025-01-08 DIAGNOSIS — E11.22 TYPE 2 DIABETES MELLITUS WITH STAGE 4 CHRONIC KIDNEY DISEASE, WITH LONG-TERM CURRENT USE OF INSULIN (HCC): ICD-10-CM

## 2025-01-08 PROCEDURE — 95251 CONT GLUC MNTR ANALYSIS I&R: CPT | Performed by: INTERNAL MEDICINE

## 2025-01-08 PROCEDURE — 1036F TOBACCO NON-USER: CPT | Performed by: INTERNAL MEDICINE

## 2025-01-08 PROCEDURE — 1123F ACP DISCUSS/DSCN MKR DOCD: CPT | Performed by: INTERNAL MEDICINE

## 2025-01-08 PROCEDURE — M1308 PR FLU IMMUNIZE NO ADMIN: HCPCS | Performed by: INTERNAL MEDICINE

## 2025-01-08 PROCEDURE — G8417 CALC BMI ABV UP PARAM F/U: HCPCS | Performed by: INTERNAL MEDICINE

## 2025-01-08 PROCEDURE — 99214 OFFICE O/P EST MOD 30 MIN: CPT | Performed by: INTERNAL MEDICINE

## 2025-01-08 PROCEDURE — G2211 COMPLEX E/M VISIT ADD ON: HCPCS | Performed by: INTERNAL MEDICINE

## 2025-01-08 PROCEDURE — G8428 CUR MEDS NOT DOCUMENT: HCPCS | Performed by: INTERNAL MEDICINE

## 2025-01-08 RX ORDER — SEMAGLUTIDE 2.68 MG/ML
INJECTION, SOLUTION SUBCUTANEOUS
Qty: 3 ML | Refills: 5 | Status: SHIPPED | OUTPATIENT
Start: 2025-01-08

## 2025-01-08 NOTE — PROGRESS NOTES
Chief Complaint   Patient presents with    Diabetes       Patient was last seen: 8/13/2024        General:   Knee replacement 11/26/24 - recovery pretty good      Medicate, no deductible, has supplement, no donut hole issues       A1c: last a1c was 7.0  (does have anemia from CRI)    DM Medications:    Toujeo Max 78 units daily - rotating injections  Glipizide ER 10mg once a day  Ozempic 1mg once a week     NOT USING SCALE (Hlog)  150-200 4 units  200-250 8 units  250-300 12 units  Over 300  16 units      Last Changes: :see last note     Sugar Checks: checks more than 4 times a day and uses [employer] CGM     AM: reports:      PM: reports:         LOWs:  has low sugars     DIET: completed the Program for Diabetes Health     EXERCISE: does not exercise, exercise limited due to pain     HTN: on ACE-I, on B-Blk, on diuretics, on clonidine, HTN followed by PCP, HTN followed by Cardiology     LIPIDS: on statin, on fenofibrate, lipids followed by PCP, lipids followed by Cardiology    RENAL: **has severe renal insufficiency**, is followed by Nephrology, is on an ACE-I     EYES: has no retinopathy, overdue for eye exam     DENTAL:  has seen dentist in past year     FEET: sees podiatry, has no current issues     HEART:  no chest pain, shortness of breath or claudication, has no cardiac history, is followed by Cardiology for HTN    ASA:  is on aspirin     SYMPTOMS: no polyuria, thirst or blurred vision     THYROID: no known thyroid issue    DIABETES HISTORY:   From initial visit: 10/18/2022    DM 15-20 years  Metformin early on  But then steroids and sugars 500  Put on insulin then      May have been on glipizide  No DPP4, no SGLT, no TZD  On ozempic for ~ 6 mo        LABS/STUDIES:   5/9/22 a1c 8.2  9/13/22 GFR 26, ANNA-r 95, a1c 9.4    10/5/23 GFR 22%, ANNA-r 115, A1c 9.7, Vit D 21.3, Phos 2.8, Mag 2.7,   1/17/24 A1c 8.7, GFR 27%, Hg 9.0    No results found for: \"[id number]\"      Lab Results   Component Value  Daily call completed with patients son she was unavailable at the time homecare RN was at the house today bp 118/64 hr 84 pox 99 % RA no weight today patient picked up all her medications no needs no questions from the son reviewed upcomming appointments     Patient's Address:   [address] [facility] [address]  [place] [address]  **  If this is not the address patient will receive services - alert team and address in EMR**       Patient Contacts:  Extended Emergency Contact Information  Primary Emergency Contact: Rha Modi  Address: [address] [place] [address]  Home Phone: [phone]  Relation: Spouse  Secondary Emergency Contact: Claudia Hartley  Mobile Phone: [phone]  Relation: Daughter                                Patient's Preferred Phone: [phone]  Patient's E-mail: [email or web address]

## 2025-01-08 NOTE — PATIENT INSTRUCTIONS
Increase ozempic to 2.0mg once a week    Lower the Toujeo Max 60 units once a day    Continue the glipizide ER 10mg once a day     I will try again to get the Torsten 3 from [facility]    If you continue with any low sugars, lower the toujeo max 5 units at a time   If you still are having high sugars during the day, let me know since we can increase the glipizide

## 2025-01-20 DIAGNOSIS — Z79.4 TYPE 2 DIABETES MELLITUS WITH HYPERGLYCEMIA, WITH LONG-TERM CURRENT USE OF INSULIN (HCC): Primary | ICD-10-CM

## 2025-01-20 DIAGNOSIS — E11.65 TYPE 2 DIABETES MELLITUS WITH HYPERGLYCEMIA, WITH LONG-TERM CURRENT USE OF INSULIN (HCC): Primary | ICD-10-CM

## 2025-01-20 RX ORDER — ACYCLOVIR 800 MG/1
TABLET ORAL
Qty: 6 EACH | Refills: 1 | Status: SHIPPED | OUTPATIENT
Start: 2025-01-20

## 2025-01-21 ENCOUNTER — CLINICAL DOCUMENTATION (OUTPATIENT)
Age: 80
End: 2025-01-21

## 2025-01-22 ENCOUNTER — TELEPHONE (OUTPATIENT)
Age: 80
End: 2025-01-22

## 2025-01-22 NOTE — TELEPHONE ENCOUNTER
Not on My Chart  Check if lows gone after lowering basal insulin  And check if still high during the day b/c we can increase the glipizde

## 2025-01-24 NOTE — TELEPHONE ENCOUNTER
I attempted to call Mr. Diaz and woke him up from a nap. He couldn't think straight and asked me to call him back at a another time.  Cintia

## 2025-02-13 ENCOUNTER — HOSPITAL ENCOUNTER (EMERGENCY)
Facility: HOSPITAL | Age: 80
Discharge: HOME | End: 2025-02-13
Attending: EMERGENCY MEDICINE
Payer: MEDICARE

## 2025-02-13 ENCOUNTER — APPOINTMENT (OUTPATIENT)
Facility: HOSPITAL | Age: 80
End: 2025-02-13
Payer: MEDICARE

## 2025-02-13 VITALS
RESPIRATION RATE: 20 BRPM | SYSTOLIC BLOOD PRESSURE: 164 MMHG | TEMPERATURE: 99.3 F | HEART RATE: 77 BPM | DIASTOLIC BLOOD PRESSURE: 53 MMHG | OXYGEN SATURATION: 97 %

## 2025-02-13 DIAGNOSIS — R53.1 GENERALIZED WEAKNESS: Primary | ICD-10-CM

## 2025-02-13 LAB
ALBUMIN SERPL-MCNC: 3.3 G/DL (ref 3.5–5)
ALBUMIN/GLOB SERPL: 0.8 (ref 1.1–2.2)
ALP SERPL-CCNC: 44 U/L (ref 45–117)
ALT SERPL-CCNC: 26 U/L (ref 12–78)
ANION GAP SERPL CALC-SCNC: 3 MMOL/L (ref 2–12)
APPEARANCE UR: CLEAR
AST SERPL-CCNC: 21 U/L (ref 15–37)
BACTERIA URNS QL MICRO: NEGATIVE /HPF
BASOPHILS # BLD: 0.01 K/UL (ref 0–0.1)
BASOPHILS NFR BLD: 0.1 % (ref 0–1)
BILIRUB SERPL-MCNC: 0.5 MG/DL (ref 0.2–1)
BILIRUB UR QL: NEGATIVE
BUN SERPL-MCNC: 41 MG/DL (ref 6–20)
BUN/CREAT SERPL: 15 (ref 12–20)
CALCIUM SERPL-MCNC: 10 MG/DL (ref 8.5–10.1)
CHLORIDE SERPL-SCNC: 102 MMOL/L (ref 97–108)
CO2 SERPL-SCNC: 31 MMOL/L (ref 21–32)
COLOR UR: ABNORMAL
CREAT SERPL-MCNC: 2.71 MG/DL (ref 0.7–1.3)
DIFFERENTIAL METHOD BLD: ABNORMAL
EOSINOPHIL # BLD: 0.2 K/UL (ref 0–0.4)
EOSINOPHIL NFR BLD: 2.9 % (ref 0–7)
EPITH CASTS URNS QL MICRO: ABNORMAL /LPF
ERYTHROCYTE [DISTWIDTH] IN BLOOD BY AUTOMATED COUNT: 12.9 % (ref 11.5–14.5)
GLOBULIN SER CALC-MCNC: 4 G/DL (ref 2–4)
GLUCOSE SERPL-MCNC: 175 MG/DL (ref 65–100)
GLUCOSE UR STRIP.AUTO-MCNC: NEGATIVE MG/DL
HCT VFR BLD AUTO: 26 % (ref 36.6–50.3)
HGB BLD-MCNC: 8.9 G/DL (ref 12.1–17)
HGB UR QL STRIP: NEGATIVE
HYALINE CASTS URNS QL MICRO: ABNORMAL /LPF (ref 0–2)
IMM GRANULOCYTES # BLD AUTO: 0.04 K/UL (ref 0–0.04)
IMM GRANULOCYTES NFR BLD AUTO: 0.6 % (ref 0–0.5)
KETONES UR QL STRIP.AUTO: NEGATIVE MG/DL
LEUKOCYTE ESTERASE UR QL STRIP.AUTO: NEGATIVE
LYMPHOCYTES # BLD: 1.7 K/UL (ref 0.8–3.5)
LYMPHOCYTES NFR BLD: 24.5 % (ref 12–49)
MCH RBC QN AUTO: 30.1 PG (ref 26–34)
MCHC RBC AUTO-ENTMCNC: 34.2 G/DL (ref 30–36.5)
MCV RBC AUTO: 87.8 FL (ref 80–99)
MONOCYTES # BLD: 0.79 K/UL (ref 0–1)
MONOCYTES NFR BLD: 11.4 % (ref 5–13)
NEUTS SEG # BLD: 4.19 K/UL (ref 1.8–8)
NEUTS SEG NFR BLD: 60.5 % (ref 32–75)
NITRITE UR QL STRIP.AUTO: NEGATIVE
NRBC # BLD: 0 K/UL (ref 0–0.01)
NRBC BLD-RTO: 0 PER 100 WBC
PH UR STRIP: 6 (ref 5–8)
PLATELET # BLD AUTO: 215 K/UL (ref 150–400)
PMV BLD AUTO: 10.6 FL (ref 8.9–12.9)
POTASSIUM SERPL-SCNC: 3.8 MMOL/L (ref 3.5–5.1)
PROT SERPL-MCNC: 7.3 G/DL (ref 6.4–8.2)
PROT UR STRIP-MCNC: ABNORMAL MG/DL
RBC # BLD AUTO: 2.96 M/UL (ref 4.1–5.7)
RBC #/AREA URNS HPF: ABNORMAL /HPF (ref 0–5)
SODIUM SERPL-SCNC: 136 MMOL/L (ref 136–145)
SP GR UR REFRACTOMETRY: 1.01
URINE CULTURE IF INDICATED: ABNORMAL
UROBILINOGEN UR QL STRIP.AUTO: 0.2 EU/DL (ref 0.2–1)
WBC # BLD AUTO: 6.9 K/UL (ref 4.1–11.1)
WBC URNS QL MICRO: ABNORMAL /HPF (ref 0–4)

## 2025-02-13 PROCEDURE — 71045 X-RAY EXAM CHEST 1 VIEW: CPT

## 2025-02-13 PROCEDURE — 80053 COMPREHEN METABOLIC PANEL: CPT

## 2025-02-13 PROCEDURE — 85025 COMPLETE CBC W/AUTO DIFF WBC: CPT

## 2025-02-13 PROCEDURE — 2580000003 HC RX 258: Performed by: EMERGENCY MEDICINE

## 2025-02-13 PROCEDURE — 99284 EMERGENCY DEPT VISIT MOD MDM: CPT

## 2025-02-13 PROCEDURE — 81001 URINALYSIS AUTO W/SCOPE: CPT

## 2025-02-13 PROCEDURE — 36415 COLL VENOUS BLD VENIPUNCTURE: CPT

## 2025-02-13 RX ORDER — 0.9 % SODIUM CHLORIDE 0.9 %
500 INTRAVENOUS SOLUTION INTRAVENOUS ONCE
Status: COMPLETED | OUTPATIENT
Start: 2025-02-13 | End: 2025-02-13

## 2025-02-13 RX ADMIN — SODIUM CHLORIDE 500 ML: 900 INJECTION, SOLUTION INTRAVENOUS at 15:00

## 2025-02-13 ASSESSMENT — PAIN SCALES - GENERAL: PAINLEVEL_OUTOF10: 0

## 2025-02-13 NOTE — DISCHARGE INSTRUCTIONS
Your lab work today was unremarkable including your urinalysis.  Your hemoglobin and renal function are at baseline.  Make sure you are drinking plenty of water 64 ounces a day.    You may have the beginnings of a virus so make sure you are staying well-hydrated.

## 2025-02-13 NOTE — ED NOTES
Patient assisted to standing position to urinate, tolerated well. Pt refusing to lay back in stretcher, stating \"I need to sit up for a little while.\" Patient's call bell placed within reach. Bed locked and in low position.

## 2025-02-13 NOTE — ED NOTES
Pt discharged by Barbara LAIRD. Discharge instructions discussed and pt given opportunity to ask questions. Pt ambulatory out of ED

## 2025-02-13 NOTE — ED NOTES
Patient assisted to standing position to urinate using urinal. Tolerated well. Back to stretcher without difficulty. Bed locked and in low position, side rails x2, call bell within reach.

## 2025-02-13 NOTE — ED PROVIDER NOTES
[facility] EMERGENCY DEPARTMENT  EMERGENCY DEPARTMENT ENCOUNTER       Pt Name: Sabas Diaz  MRN: [id number]  Birthdate 1945  Date of evaluation: 2/13/2025  Provider: Reinaldo Jimenez DO   PCP: Vera Zaragoza MD  Note Started: 1:56 PM EST 2/13/25     CHIEF COMPLAINT       Chief Complaint   Patient presents with    Fatigue     Patient BIBEMS for c/o increased fatigue and weakness, baseline confusion.        HISTORY OF PRESENT ILLNESS: 1 or more elements      History From: Patient, History limited by: none     Sabas Diaz is a 79 y.o. male presents to the emergency department for evaluation of generalized fatigue.       Please See MDM for Additional Details of the HPI/PMH  Nursing Notes were all reviewed and agreed with or any disagreements were addressed in the HPI.     REVIEW OF SYSTEMS        Positives and Pertinent negatives as per HPI.    PAST HISTORY     Past Medical History:  Past Medical History:   Diagnosis Date    Diabetes (HCC)     Hypertension     Prostate CA (HCC)     Sleep apnea        Past Surgical History:  Past Surgical History:   Procedure Laterality Date    VEIN SURGERY         Family History:  No family history on file.    Social History:  Social History     Tobacco Use    Smoking status: Never    Smokeless tobacco: Never   Vaping Use    Vaping status: Never Used       Allergies:  No Known Allergies    CURRENT MEDICATIONS      Previous Medications    ATORVASTATIN (LIPITOR) 20 MG TABLET    TAKE 1 TABLET BY MOUTH ONCE DAILY AS DIRECTED AT BEDTIME FOR 90 DAYS    CARVEDILOL (COREG) 6.25 MG TABLET    Take 1 tablet by mouth 2 times daily    CLOBETASOL (TEMOVATE) 0.05 % CREAM    clobetasol 0.05 % topical cream   APPLY ONCE DAILY TO LOWER LEGS AS NEEDED    CLONIDINE (CATAPRES) 0.1 MG TABLET    Take 1 tablet by mouth 2 times daily    CONTINUOUS GLUCOSE SENSOR (FREESTYLE CHRISSY 3 SENSOR) MISC    Use as directed by MD.    DICLOFENAC SODIUM (VOLTAREN) 1 % GEL    Apply 2 g topically 4 times daily as  appointment, should his condition change.     CLINICAL IMPRESSION    Discharge Note: The patient is stable for discharge home. The signs, symptoms, diagnosis, and discharge instructions have been discussed, understanding conveyed, and agreed upon. The patient is to follow up as recommended or return to ER should their symptoms worsen.      PATIENT REFERRED TO:  No follow-up provider specified.     DISCHARGE MEDICATIONS:     Medication List        CONTINUE taking these medications      FreeStyle Torsten 3 Sensor Misc  Use as directed by MD.            ASK your doctor about these medications      atorvastatin 20 MG tablet  Commonly known as: LIPITOR     carvedilol 6.25 MG tablet  Commonly known as: COREG  Take 1 tablet by mouth 2 times daily     clobetasol 0.05 % cream  Commonly known as: TEMOVATE     cloNIDine 0.1 MG tablet  Commonly known as: CATAPRES     diclofenac sodium 1 % Gel  Commonly known as: VOLTAREN  Apply 2 g topically 4 times daily as needed for Pain (neck, knee, back pain)     fenofibrate micronized 134 MG capsule  Commonly known as: LOFIBRA     furosemide 40 MG tablet  Commonly known as: LASIX     gabapentin 100 MG capsule  Commonly known as: NEURONTIN     glipiZIDE 10 MG tablet  Commonly known as: GLUCOTROL  Take 1 tablet by mouth once daily     lidocaine 4 % external patch  Place 1 patch onto the skin daily as needed (right shoulder pain/neck pain)     Ozempic (2 MG/DOSE) 8 MG/3ML Sopn sc injection  Generic drug: semaglutide (2 MG/DOSE)  Inject 2 mg subcutaneously every 7 days     Toujeo Max SoloStar 300 UNIT/ML concentrated injection pen  Generic drug: Insulin Glargine (2 Unit Dial)  INJECT 110 UNITS SUBCUTANEUOUSLY ONCE DAILY     vitamin D 25 MCG (1000 UT) Tabs tablet  Commonly known as: CHOLECALCIFEROL                DISCONTINUED MEDICATIONS:  Current Discharge Medication List          I am the Primary Clinician of Record.   Reinaldo Jimenez DO (electronically signed)    (Please note that parts of this

## 2025-03-04 ENCOUNTER — OFFICE VISIT (OUTPATIENT)
Age: 80
End: 2025-03-04

## 2025-03-04 VITALS
TEMPERATURE: 98.2 F | HEART RATE: 68 BPM | RESPIRATION RATE: 16 BRPM | BODY MASS INDEX: 30.71 KG/M2 | SYSTOLIC BLOOD PRESSURE: 132 MMHG | DIASTOLIC BLOOD PRESSURE: 69 MMHG | OXYGEN SATURATION: 99 % | WEIGHT: 214 LBS

## 2025-03-04 DIAGNOSIS — I99.9 VASCULAR ABNORMALITY: Primary | ICD-10-CM

## 2025-03-04 RX ORDER — ASPIRIN 81 MG/1
1 TABLET ORAL 2 TIMES DAILY
COMMUNITY
Start: 2024-12-19

## 2025-03-04 NOTE — PROGRESS NOTES
Tobacco Use    Smoking status: Never    Smokeless tobacco: Never   Vaping Use    Vaping status: Never Used   Substance and Sexual Activity    Alcohol use: Not on file    Drug use: Not on file    Sexual activity: Not on file   Other Topics Concern    Not on file   Social History Narrative    Not on file     Social Determinants of Health     Financial Resource Strain: Not on file   Food Insecurity: No Food Insecurity (8/28/2024)    Hunger Vital Sign     Worried About Running Out of Food in the Last Year: Never true     Ran Out of Food in the Last Year: Never true   Transportation Needs: No Transportation Needs (8/28/2024)    PRAPARE - Transportation     Lack of Transportation (Medical): No     Lack of Transportation (Non-Medical): No   Physical Activity: Not on file   Stress: Not on file   Social Connections: Not on file   Intimate Partner Violence: Not on file   Housing Stability: Low Risk  (8/28/2024)    Housing Stability Vital Sign     Unable to Pay for Housing in the Last Year: No     Number of Times Moved in the Last Year: 1     Homeless in the Last Year: No       Past Medical History:   Diagnosis Date    Diabetes (HCC)     Hypertension     Prostate CA (HCC)     Sleep apnea        Past Surgical History:   Procedure Laterality Date    VEIN SURGERY         Prior to Admission medications    Medication Sig Start Date End Date Taking? Authorizing Provider   aspirin 81 MG EC tablet Take 1 tablet by mouth 2 times daily 12/19/24  Yes Devorah Matute MD   cyanocobalamin 1000 MCG tablet Take 1 tablet by mouth daily    ProviderDevorah MD   OZEMPIC, 2 MG/DOSE, 8 MG/3ML SOPN sc injection Inject 2 mg subcutaneously every 7 days 1/8/25   Mague Armstrong MD   glipiZIDE (GLUCOTROL) 10 MG tablet Take 1 tablet by mouth once daily 12/20/24   Armstrong, Mague M, MD   TOUJEO MAX SOLOSTAR 300 UNIT/ML concentrated injection pen INJECT 110 UNITS SUBCUTANEUOUSLY ONCE DAILY 12/4/24   Mague Armstrong MD   carvedilol

## 2025-03-04 NOTE — PATIENT INSTRUCTIONS
Discussed with patient no signs of cellulitis no warmth to area discoloration is symmetrical bilaterally and most likely related to vascular compromise.  Should continue with vascular doctor cream has prescribed has well has cardiologist and discussed with patient on furosemide and to make sure checking potassium with doctors on regular basis.  If any increase in symptoms fevers chills redness spreading red streaks warm to touch those would be signs of an infection and to go to the emergency department

## 2025-04-07 RX ORDER — GLIPIZIDE 10 MG/1
10 TABLET ORAL DAILY
Qty: 30 TABLET | Refills: 5 | Status: SHIPPED | OUTPATIENT
Start: 2025-04-07

## 2025-04-08 ENCOUNTER — OFFICE VISIT (OUTPATIENT)
Age: 80
End: 2025-04-08

## 2025-04-08 VITALS
WEIGHT: 212 LBS | HEART RATE: 50 BPM | TEMPERATURE: 97.6 F | OXYGEN SATURATION: 99 % | SYSTOLIC BLOOD PRESSURE: 149 MMHG | BODY MASS INDEX: 30.42 KG/M2 | DIASTOLIC BLOOD PRESSURE: 72 MMHG | RESPIRATION RATE: 16 BRPM

## 2025-04-08 DIAGNOSIS — R00.1 SYMPTOMATIC BRADYCARDIA: Primary | ICD-10-CM

## 2025-04-08 DIAGNOSIS — R29.810 WEAKNESS ON LEFT SIDE OF FACE: ICD-10-CM

## 2025-04-08 DIAGNOSIS — Z71.89 COMPLEX CARE COORDINATION: ICD-10-CM

## 2025-04-08 LAB — GLUCOSE DOSE-GTT, POC: 285

## 2025-04-08 RX ORDER — FENOFIBRATE 134 MG/1
CAPSULE ORAL
COMMUNITY
Start: 2025-03-11

## 2025-04-08 RX ORDER — TRAZODONE HYDROCHLORIDE 50 MG/1
TABLET ORAL
COMMUNITY
Start: 2025-04-01

## 2025-04-08 RX ORDER — ACETAMINOPHEN 500 MG
1000 TABLET ORAL
COMMUNITY
Start: 2024-11-25

## 2025-04-08 NOTE — PATIENT INSTRUCTIONS
Discussed with patient and wife concern with left-sided facial weakness and symptomatic bradycardia has patient is lightheaded and has heart rate in the 40s.  Wife is not sure when he started to have facial weakness has not noticed it before wife says he has mild dementia so is constantly confused and asking questions. Called 911 and pt transported to [facility] per patient's wife request.

## 2025-04-08 NOTE — PROGRESS NOTES
discussed the diagnosis with the patient and the intended plan as seen in the above orders. The patient has received an after-visit summary and questions were answered concerning future plans. I have discussed medication side effects and warnings with the patient as well. The patient agrees and understands above plan.     An electronic signature was used to authenticate this note.    Pina Santoro, CORTNEY - CNP

## 2025-04-13 RX ORDER — INSULIN GLARGINE 300 U/ML
INJECTION, SOLUTION SUBCUTANEOUS
Qty: 12 ML | Refills: 1 | Status: SHIPPED | OUTPATIENT
Start: 2025-04-13

## 2025-04-24 RX ORDER — HYDROCHLOROTHIAZIDE 12.5 MG/1
CAPSULE ORAL
Qty: 2 EACH | Refills: 5 | Status: SHIPPED | OUTPATIENT
Start: 2025-04-24

## 2025-04-28 RX ORDER — KETOROLAC TROMETHAMINE 30 MG/ML
INJECTION, SOLUTION INTRAMUSCULAR; INTRAVENOUS
Qty: 1 EACH | Refills: 0 | Status: SHIPPED | OUTPATIENT
Start: 2025-04-28

## 2025-05-10 DIAGNOSIS — E11.65 TYPE 2 DIABETES MELLITUS WITH HYPERGLYCEMIA, WITH LONG-TERM CURRENT USE OF INSULIN (HCC): ICD-10-CM

## 2025-05-10 DIAGNOSIS — Z79.4 TYPE 2 DIABETES MELLITUS WITH HYPERGLYCEMIA, WITH LONG-TERM CURRENT USE OF INSULIN (HCC): ICD-10-CM

## 2025-05-23 RX ORDER — GLIPIZIDE 10 MG/1
10 TABLET ORAL DAILY
Qty: 30 TABLET | Refills: 3 | Status: SHIPPED | OUTPATIENT
Start: 2025-05-23

## 2025-07-07 ENCOUNTER — TELEPHONE (OUTPATIENT)
Age: 80
End: 2025-07-07

## 2025-07-16 ENCOUNTER — OFFICE VISIT (OUTPATIENT)
Age: 80
End: 2025-07-16

## 2025-07-16 VITALS
WEIGHT: 216 LBS | DIASTOLIC BLOOD PRESSURE: 74 MMHG | BODY MASS INDEX: 30.92 KG/M2 | HEIGHT: 70 IN | SYSTOLIC BLOOD PRESSURE: 137 MMHG | HEART RATE: 65 BPM

## 2025-07-16 DIAGNOSIS — E11.65 TYPE 2 DIABETES MELLITUS WITH HYPERGLYCEMIA, WITH LONG-TERM CURRENT USE OF INSULIN (HCC): Primary | ICD-10-CM

## 2025-07-16 DIAGNOSIS — Z79.4 TYPE 2 DIABETES MELLITUS WITH MICROALBUMINURIA, WITH LONG-TERM CURRENT USE OF INSULIN (HCC): ICD-10-CM

## 2025-07-16 DIAGNOSIS — E11.22 TYPE 2 DIABETES MELLITUS WITH STAGE 4 CHRONIC KIDNEY DISEASE, WITH LONG-TERM CURRENT USE OF INSULIN (HCC): ICD-10-CM

## 2025-07-16 DIAGNOSIS — N18.4 TYPE 2 DIABETES MELLITUS WITH STAGE 4 CHRONIC KIDNEY DISEASE, WITH LONG-TERM CURRENT USE OF INSULIN (HCC): ICD-10-CM

## 2025-07-16 DIAGNOSIS — R80.9 TYPE 2 DIABETES MELLITUS WITH MICROALBUMINURIA, WITH LONG-TERM CURRENT USE OF INSULIN (HCC): ICD-10-CM

## 2025-07-16 DIAGNOSIS — E78.2 MIXED HYPERLIPIDEMIA: ICD-10-CM

## 2025-07-16 DIAGNOSIS — E11.29 TYPE 2 DIABETES MELLITUS WITH MICROALBUMINURIA, WITH LONG-TERM CURRENT USE OF INSULIN (HCC): ICD-10-CM

## 2025-07-16 DIAGNOSIS — Z79.4 TYPE 2 DIABETES MELLITUS WITH HYPERGLYCEMIA, WITH LONG-TERM CURRENT USE OF INSULIN (HCC): Primary | ICD-10-CM

## 2025-07-16 DIAGNOSIS — I10 PRIMARY HYPERTENSION: ICD-10-CM

## 2025-07-16 DIAGNOSIS — Z79.4 TYPE 2 DIABETES MELLITUS WITH STAGE 4 CHRONIC KIDNEY DISEASE, WITH LONG-TERM CURRENT USE OF INSULIN (HCC): ICD-10-CM

## 2025-07-16 RX ORDER — TIRZEPATIDE 7.5 MG/.5ML
INJECTION, SOLUTION SUBCUTANEOUS
Qty: 2 ML | Refills: 0 | Status: SHIPPED | OUTPATIENT
Start: 2025-07-16

## 2025-07-16 RX ORDER — HYDRALAZINE HYDROCHLORIDE 25 MG/1
25 TABLET, FILM COATED ORAL 2 TIMES DAILY WITH MEALS
COMMUNITY
Start: 2025-07-06

## 2025-07-16 RX ORDER — NIFEDIPINE 30 MG/1
60 TABLET, EXTENDED RELEASE ORAL DAILY
COMMUNITY
Start: 2025-04-12

## 2025-07-16 RX ORDER — TIRZEPATIDE 10 MG/.5ML
INJECTION, SOLUTION SUBCUTANEOUS
Qty: 2 ML | Refills: 3 | Status: SHIPPED | OUTPATIENT
Start: 2025-07-16

## 2025-07-16 RX ORDER — TAMSULOSIN HYDROCHLORIDE 0.4 MG/1
0.4 CAPSULE ORAL DAILY
COMMUNITY
Start: 2025-05-20

## 2025-07-16 RX ORDER — TIRZEPATIDE 2.5 MG/.5ML
INJECTION, SOLUTION SUBCUTANEOUS
Qty: 2 ML | Refills: 0 | Status: SHIPPED | COMMUNITY
Start: 2025-07-16

## 2025-07-16 NOTE — PATIENT INSTRUCTIONS
Start Mounjaro 2.5mg once a week for 2 week  Then take 2 of the 2.5mg at one time for 5mg dose  After that week start the 7.5mg dose that was sent to the pharmacy   A 10.0mg dose was given so can increase after one month if tolerated  Contact the office if want to go to 12.5mg and then 15 mg    Lower Toujeo 50 units once a day - can further lower with increasing mounjaro doses     Continue Glipizide

## 2025-07-16 NOTE — PROGRESS NOTES
Chief Complaint   Patient presents with    Diabetes       Patient was last seen: 1/8/2025          General:   No new issues   Off ozempic - not clear why*     Medicate, no deductible, has supplement, no donut hole issues       A1c: last a1c was 7.0  (does have anemia from CRI)  GMI 8.1 - off ozempic!    DM Medications:    Toujeo Max 60 units daily - rotating injections  Glipizide ER 10mg once a day  Ozempic 1mg once a week     NOT USING SCALE (Hlog)  150-200 4 units  200-250 8 units  250-300 12 units  Over 300  16 units      Last Changes: :see last note     Sugar Checks: checks more than 4 times a day and uses [employer] CGM   AM: reports:    PM: reports:     ** TIME OFF BY 12 HOURS **        LOWs:  has low sugars     DIET: completed the Program for Diabetes Health     EXERCISE: does not exercise, exercise limited due to pain     HTN: on ACE-I, on B-Blk, on diuretics, on clonidine, HTN followed by PCP, HTN followed by Cardiology     LIPIDS: on statin, on fenofibrate, lipids followed by PCP, lipids followed by Cardiology    RENAL: **has severe renal insufficiency**, is followed by Nephrology, is on an ACE-I     EYES: has no retinopathy, overdue for eye exam     DENTAL:  has seen dentist in past year     FEET: sees podiatry, has no current issues     HEART:  no chest pain, shortness of breath or claudication, has no cardiac history, is followed by Cardiology for HTN    ASA:  is on aspirin     SYMPTOMS: no polyuria, thirst or blurred vision     THYROID: no known thyroid issue    DIABETES HISTORY:   From initial visit: 10/18/2022    DM 15-20 years  Metformin early on  But then steroids and sugars 500  Put on insulin then      May have been on glipizide  No DPP4, no SGLT, no TZD  On ozempic for ~ 6 mo        LABS/STUDIES:   5/9/22 a1c 8.2  9/13/22 GFR 26, ANNA-r 95, a1c 9.4    10/5/23 GFR 22%, ANNA-r 115, A1c 9.7, Vit D 21.3, Phos 2.8, Mag 2.7,   1/17/24 A1c 8.7, GFR 27%, Hg 9.0    No results found for:

## 2025-07-20 RX ORDER — GLIPIZIDE 10 MG/1
10 TABLET ORAL DAILY
Qty: 30 TABLET | Refills: 3 | Status: SHIPPED | OUTPATIENT
Start: 2025-07-20

## 2025-08-20 RX ORDER — INSULIN GLARGINE 300 U/ML
INJECTION, SOLUTION SUBCUTANEOUS
Qty: 18 ML | Refills: 0 | Status: SHIPPED | OUTPATIENT
Start: 2025-08-20